# Patient Record
Sex: FEMALE | URBAN - METROPOLITAN AREA
[De-identification: names, ages, dates, MRNs, and addresses within clinical notes are randomized per-mention and may not be internally consistent; named-entity substitution may affect disease eponyms.]

---

## 2019-08-13 ENCOUNTER — APPOINTMENT (RX ONLY)
Dept: URBAN - METROPOLITAN AREA CLINIC 26 | Facility: CLINIC | Age: 59
Setting detail: DERMATOLOGY
End: 2019-08-13

## 2019-08-13 DIAGNOSIS — L28.1 PRURIGO NODULARIS: ICD-10-CM

## 2019-08-13 DIAGNOSIS — L82.1 OTHER SEBORRHEIC KERATOSIS: ICD-10-CM

## 2019-08-13 DIAGNOSIS — L81.4 OTHER MELANIN HYPERPIGMENTATION: ICD-10-CM

## 2019-08-13 DIAGNOSIS — L64.8 OTHER ANDROGENIC ALOPECIA: ICD-10-CM

## 2019-08-13 DIAGNOSIS — D22 MELANOCYTIC NEVI: ICD-10-CM

## 2019-08-13 DIAGNOSIS — L43.8 OTHER LICHEN PLANUS: ICD-10-CM

## 2019-08-13 DIAGNOSIS — D18.0 HEMANGIOMA: ICD-10-CM

## 2019-08-13 PROBLEM — D18.01 HEMANGIOMA OF SKIN AND SUBCUTANEOUS TISSUE: Status: ACTIVE | Noted: 2019-08-13

## 2019-08-13 PROBLEM — J45.909 UNSPECIFIED ASTHMA, UNCOMPLICATED: Status: ACTIVE | Noted: 2019-08-13

## 2019-08-13 PROBLEM — L30.9 DERMATITIS, UNSPECIFIED: Status: ACTIVE | Noted: 2019-08-13

## 2019-08-13 PROBLEM — D22.5 MELANOCYTIC NEVI OF TRUNK: Status: ACTIVE | Noted: 2019-08-13

## 2019-08-13 PROBLEM — D48.5 NEOPLASM OF UNCERTAIN BEHAVIOR OF SKIN: Status: ACTIVE | Noted: 2019-08-13

## 2019-08-13 PROCEDURE — 99214 OFFICE O/P EST MOD 30 MIN: CPT | Mod: 25

## 2019-08-13 PROCEDURE — ? ADDITIONAL NOTES

## 2019-08-13 PROCEDURE — ? TREATMENT REGIMEN

## 2019-08-13 PROCEDURE — ? PRESCRIPTION

## 2019-08-13 PROCEDURE — 11102 TANGNTL BX SKIN SINGLE LES: CPT

## 2019-08-13 PROCEDURE — 11103 TANGNTL BX SKIN EA SEP/ADDL: CPT

## 2019-08-13 PROCEDURE — ? BIOPSY BY SHAVE METHOD

## 2019-08-13 PROCEDURE — ? COUNSELING

## 2019-08-13 PROCEDURE — ? SUNSCREEN RECOMMENDATIONS

## 2019-08-13 RX ORDER — NALTREXONE HYDROCHLORIDE 50 MG/1
TABLET, FILM COATED ORAL
Qty: 10 | Refills: 0 | Status: ERX | COMMUNITY
Start: 2019-08-13

## 2019-08-13 RX ADMIN — NALTREXONE HYDROCHLORIDE: 50 TABLET, FILM COATED ORAL at 15:33

## 2019-08-13 ASSESSMENT — LOCATION DETAILED DESCRIPTION DERM
LOCATION DETAILED: LEFT ANTERIOR MEDIAL PROXIMAL THIGH
LOCATION DETAILED: PERIUMBILICAL SKIN
LOCATION DETAILED: RIGHT SUPERIOR PARIETAL SCALP
LOCATION DETAILED: LEFT ANTERIOR PROXIMAL THIGH
LOCATION DETAILED: SUPERIOR MID FOREHEAD
LOCATION DETAILED: EPIGASTRIC SKIN
LOCATION DETAILED: RIGHT CENTRAL MALAR CHEEK

## 2019-08-13 ASSESSMENT — LOCATION ZONE DERM
LOCATION ZONE: LEG
LOCATION ZONE: SCALP
LOCATION ZONE: FACE
LOCATION ZONE: TRUNK

## 2019-08-13 ASSESSMENT — LOCATION SIMPLE DESCRIPTION DERM
LOCATION SIMPLE: SUPERIOR FOREHEAD
LOCATION SIMPLE: RIGHT CHEEK
LOCATION SIMPLE: ABDOMEN
LOCATION SIMPLE: LEFT THIGH
LOCATION SIMPLE: SCALP

## 2019-08-13 NOTE — HPI: SKIN LESION
What Type Of Note Output Would You Prefer (Optional)?: Bullet Format
Has Your Skin Lesion Been Treated?: not been treated
Is This A New Presentation, Or A Follow-Up?: Skin Lesions
Which Family Member (Optional)?: Father

## 2019-08-13 NOTE — PROCEDURE: TREATMENT REGIMEN
Detail Level: Zone
Initiate Treatment: To create stock solution of low-dose naltrexone:  Crush 10 tabs (500 mg) into ~500 mL(16.9 oz) of orange juice for stock solution (1mg/mL). Keep refrigerated, solution expires after 3 months. Shake vigorously with each use.\\n\\nnaltrexone 50 mg tablet \\nSig: Shake solution vigorously, and take 3mL(3mg) of solution PO (1mg/mL dilution) daily.\\n\\nCordran Tape Large Roll 4 mcg/cm2 \\nSig: Apply strip of tape to affected areas QHS x 2 weeks. Do not apply longer than 2 weeks/spot of skin/month

## 2019-08-13 NOTE — HPI: EVALUATION OF SKIN LESION(S)
What Type Of Note Output Would You Prefer (Optional)?: Bullet Format
Hpi Title: Evaluation of Skin Lesions
Have Your Spot(S) Been Treated In The Past?: has not been treated
Family Member: Father

## 2019-08-13 NOTE — PROCEDURE: BIOPSY BY SHAVE METHOD
Detail Level: Detailed
Biopsy Method: Personna blade
Destruction After The Procedure: No
Post-Care Instructions: I reviewed with the patient in detail post-care instructions. Patient is to keep the biopsy site dry overnight, and then apply bacitracin twice daily until healed. Patient may apply hydrogen peroxide soaks to remove any crusting.
Cryotherapy Text: The wound bed was treated with cryotherapy after the biopsy was performed.
Lab Facility: [id number]
Hemostasis: Aluminum Chloride
Was A Bandage Applied: Yes
Wound Care: Petrolatum
Electrodesiccation Text: The wound bed was treated with electrodesiccation after the biopsy was performed.
Notification Instructions: Patient will be notified of biopsy results. However, patient instructed to call the office if not contacted within 2 weeks.
Anesthesia Volume In Cc (Will Not Render If 0): 0.3
Biopsy Type: H and E
Type Of Destruction Used: Curettage
Consent: Written consent was obtained and risks were reviewed including but not limited to scarring, infection, bleeding, scabbing, incomplete removal, nerve damage and allergy to anesthesia.
Electrodesiccation And Curettage Text: The wound bed was treated with electrodesiccation and curettage after the biopsy was performed.
X Size Of Lesion In Cm: 0
Depth Of Biopsy: dermis
Dressing: bandage
Silver Nitrate Text: The wound bed was treated with silver nitrate after the biopsy was performed.
Anesthesia Type: 1% lidocaine with epinephrine
Curettage Text: The wound bed was treated with curettage after the biopsy was performed.
Billing Type: Third-Party Bill
Lab: -[id number]

## 2019-08-15 RX ORDER — FLURANDRENOLIDE 4 UG/CM2
TAPE TOPICAL
Qty: 1 | Refills: 0 | Status: ERX | COMMUNITY
Start: 2019-08-15

## 2019-08-15 RX ADMIN — FLURANDRENOLIDE: 4 TAPE TOPICAL at 13:39

## 2019-09-24 ENCOUNTER — APPOINTMENT (RX ONLY)
Dept: URBAN - METROPOLITAN AREA CLINIC 26 | Facility: CLINIC | Age: 59
Setting detail: DERMATOLOGY
End: 2019-09-24

## 2019-09-24 DIAGNOSIS — L28.1 PRURIGO NODULARIS: ICD-10-CM | Status: IMPROVED

## 2019-09-24 DIAGNOSIS — B07.8 OTHER VIRAL WARTS: ICD-10-CM

## 2019-09-24 PROBLEM — L30.9 DERMATITIS, UNSPECIFIED: Status: ACTIVE | Noted: 2019-09-24

## 2019-09-24 PROCEDURE — ? COUNSELING

## 2019-09-24 PROCEDURE — 17110 DESTRUCTION B9 LES UP TO 14: CPT

## 2019-09-24 PROCEDURE — ? TREATMENT REGIMEN

## 2019-09-24 PROCEDURE — 99212 OFFICE O/P EST SF 10 MIN: CPT | Mod: 25

## 2019-09-24 PROCEDURE — ? LIQUID NITROGEN

## 2019-09-24 PROCEDURE — ? ADDITIONAL NOTES

## 2019-09-24 PROCEDURE — ? PATIENT SPECIFIC COUNSELING

## 2019-09-24 ASSESSMENT — LOCATION SIMPLE DESCRIPTION DERM
LOCATION SIMPLE: RIGHT CHEEK
LOCATION SIMPLE: RIGHT CALF

## 2019-09-24 ASSESSMENT — LOCATION ZONE DERM
LOCATION ZONE: LEG
LOCATION ZONE: FACE

## 2019-09-24 ASSESSMENT — LOCATION DETAILED DESCRIPTION DERM
LOCATION DETAILED: RIGHT PROXIMAL CALF
LOCATION DETAILED: RIGHT CENTRAL MALAR CHEEK

## 2019-09-24 NOTE — PROCEDURE: TREATMENT REGIMEN
Detail Level: Zone
Continue Regimen: To create stock solution of low-dose naltrexone:  Crush 10 tabs (500 mg) into ~500 mL(16.9 oz) of orange juice for stock solution (1mg/mL). Keep refrigerated, solution expires after 3 months. Shake vigorously with each use.\\n\\nnaltrexone 50 mg tablet \\nSig: Shake solution vigorously, and take 3mL(3mg) of solution PO (1mg/mL dilution) daily.\\n\\nCordran Tape Large Roll 4 mcg/cm2 \\nSig: Apply strip of tape to affected areas QHS x 2 weeks. Do not apply longer than 2 weeks/spot of skin/month

## 2019-09-24 NOTE — PROCEDURE: PATIENT SPECIFIC COUNSELING
Advised patient to f/u with psychiatrist. Advised patient she may need to  of her anxiety, as her current medication regimen may be inadequate\\n\\nAdvised her she may discontinue naltrexone solution, though patient reports she would like to finish stock solution and discontinue at that time, in the event it may be helpful.
Detail Level: Zone

## 2019-09-24 NOTE — PROCEDURE: LIQUID NITROGEN
Medical Necessity Clause: This procedure was medically necessary because the lesions that were treated were:
Medical Necessity Information: It is in your best interest to select a reason for this procedure from the list below. All of these items fulfill various CMS LCD requirements except the new and changing color options.
Consent: The patient's consent was obtained including but not limited to risks of crusting, scabbing, blistering, scarring, darker or lighter pigmentary change, recurrence, incomplete removal and infection.
Detail Level: Detailed
Post-Care Instructions: I reviewed with the patient in detail post-care instructions. Patient is to wear sunprotection, and avoid picking at any of the treated lesions. Pt may apply Vaseline to crusted or scabbing areas.
Include Z78.9 (Other Specified Conditions Influencing Health Status) As An Associated Diagnosis?: No
Number Of Freeze-Thaw Cycles: 3 freeze-thaw cycles

## 2020-05-27 ENCOUNTER — RX ONLY (OUTPATIENT)
Age: 60
Setting detail: RX ONLY
End: 2020-05-27

## 2020-05-27 RX ORDER — FLURANDRENOLIDE 4 UG/CM2
TAPE TOPICAL
Qty: 1 | Refills: 3 | Status: ERX

## 2021-06-14 ENCOUNTER — TELEPHONE (OUTPATIENT)
Dept: SCHEDULING | Facility: CLINIC | Age: 61
End: 2021-06-14

## 2021-06-14 NOTE — TELEPHONE ENCOUNTER
New Patient Appointment Request    Name of caller:  Patient     Diagnosis:  NP - Pt states she is referred by Neurology to Dr. Hall. Pt states her Neurologist and Dr. Hall spoke last week.     Referred by:  Neurology     Best contact number: [phone].     Additional notes: Pt calling to check her Appt Date and time  - No Appt was scheduled.     Pt is scheduled for First Available NP Appt for 7/09/2021 @ 1pm.     Pt is Requesting to be added to Cancellation list for sooner appt.     TY

## 2021-07-09 ENCOUNTER — OFFICE VISIT (OUTPATIENT)
Dept: CARDIOLOGY | Facility: CLINIC | Age: 61
End: 2021-07-09
Payer: COMMERCIAL

## 2021-07-09 VITALS
WEIGHT: 124 LBS | DIASTOLIC BLOOD PRESSURE: 66 MMHG | HEART RATE: 100 BPM | BODY MASS INDEX: 19.93 KG/M2 | SYSTOLIC BLOOD PRESSURE: 124 MMHG | OXYGEN SATURATION: 99 % | HEIGHT: 66 IN

## 2021-07-09 DIAGNOSIS — R00.0 RACING HEART BEAT: Primary | ICD-10-CM

## 2021-07-09 DIAGNOSIS — R09.89 PALPABLE ABDOMINAL AORTA: ICD-10-CM

## 2021-07-09 DIAGNOSIS — R06.09 DYSPNEA ON EXERTION: ICD-10-CM

## 2021-07-09 DIAGNOSIS — I25.42 CORONARY ARTERY DISSECTION: ICD-10-CM

## 2021-07-09 DIAGNOSIS — I21.4 NON-ST ELEVATION MYOCARDIAL INFARCTION (NSTEMI) (CMS/HCC): ICD-10-CM

## 2021-07-09 DIAGNOSIS — E78.5 DYSLIPIDEMIA: ICD-10-CM

## 2021-07-09 DIAGNOSIS — I21.4 NSTEMI (NON-ST ELEVATED MYOCARDIAL INFARCTION) (CMS/HCC): ICD-10-CM

## 2021-07-09 DIAGNOSIS — R00.2 PALPITATIONS: ICD-10-CM

## 2021-07-09 DIAGNOSIS — R73.03 PREDIABETES: ICD-10-CM

## 2021-07-09 DIAGNOSIS — I25.10 CORONARY ARTERY DISEASE INVOLVING NATIVE CORONARY ARTERY OF NATIVE HEART WITHOUT ANGINA PECTORIS: ICD-10-CM

## 2021-07-09 PROCEDURE — 3008F BODY MASS INDEX DOCD: CPT | Performed by: INTERNAL MEDICINE

## 2021-07-09 PROCEDURE — 99205 OFFICE O/P NEW HI 60 MIN: CPT | Performed by: INTERNAL MEDICINE

## 2021-07-09 PROCEDURE — 93000 ELECTROCARDIOGRAM COMPLETE: CPT | Performed by: INTERNAL MEDICINE

## 2021-07-09 RX ORDER — ATORVASTATIN CALCIUM 40 MG/1
40 TABLET, FILM COATED ORAL NIGHTLY
COMMUNITY
End: 2021-10-29 | Stop reason: SDUPTHER

## 2021-07-09 RX ORDER — TRIAMCINOLONE ACETONIDE 55 UG/1
SPRAY, METERED NASAL AS NEEDED
COMMUNITY
End: 2022-03-08

## 2021-07-09 RX ORDER — BUPROPION HYDROCHLORIDE 300 MG/1
300 TABLET ORAL DAILY
COMMUNITY
End: 2022-06-06

## 2021-07-09 RX ORDER — MONTELUKAST SODIUM 10 MG/1
1 TABLET ORAL NIGHTLY
COMMUNITY

## 2021-07-09 RX ORDER — DULOXETIN HYDROCHLORIDE 30 MG/1
90 CAPSULE, DELAYED RELEASE ORAL DAILY
COMMUNITY
End: 2022-05-31

## 2021-07-09 RX ORDER — DIPHENHYDRAMINE HCL 25 MG
1 CAPSULE ORAL NIGHTLY
COMMUNITY
End: 2022-06-06

## 2021-07-09 RX ORDER — ASPIRIN 81 MG/1
81 TABLET ORAL DAILY
COMMUNITY

## 2021-07-09 RX ORDER — ALBUTEROL SULFATE 90 UG/1
INHALANT RESPIRATORY (INHALATION) AS NEEDED
COMMUNITY
Start: 2021-01-28

## 2021-07-09 RX ORDER — LORAZEPAM 0.5 MG/1
0.5 TABLET ORAL AS NEEDED
COMMUNITY
Start: 2021-07-06 | End: 2023-07-12 | Stop reason: ALTCHOICE

## 2021-07-09 RX ORDER — AZELASTINE 1 MG/ML
SPRAY, METERED NASAL AS NEEDED
COMMUNITY

## 2021-07-09 RX ORDER — ALBUTEROL SULFATE 5 MG/ML
SOLUTION RESPIRATORY (INHALATION) AS NEEDED
COMMUNITY
End: 2021-07-09 | Stop reason: SDUPTHER

## 2021-07-09 RX ORDER — OLOPATADINE HYDROCHLORIDE 1 MG/ML
SOLUTION/ DROPS OPHTHALMIC AS NEEDED
COMMUNITY
Start: 2021-01-28 | End: 2022-03-08

## 2021-07-09 RX ORDER — ESZOPICLONE 3 MG/1
3 TABLET, FILM COATED ORAL NIGHTLY
COMMUNITY
End: 2022-06-06 | Stop reason: SDUPTHER

## 2021-07-09 RX ORDER — ATORVASTATIN CALCIUM 40 MG/1
40 TABLET, FILM COATED ORAL DAILY
COMMUNITY
Start: 2021-05-10 | End: 2021-07-09 | Stop reason: SDUPTHER

## 2021-07-09 ASSESSMENT — ENCOUNTER SYMPTOMS
CLAUDICATION: 0
WEIGHT LOSS: 1
DIZZINESS: 0
NAUSEA: 0
NERVOUS/ANXIOUS: 0
DIAPHORESIS: 0
DYSPNEA ON EXERTION: 1
PALPITATIONS: 1
HEARTBURN: 0
BACK PAIN: 0
DEPRESSION: 0
SHORTNESS OF BREATH: 0
SNORING: 0
WEIGHT GAIN: 0
COUGH: 0

## 2021-07-09 NOTE — ASSESSMENT & PLAN NOTE
In 2005, she had a myocardial infarction due to a coronary artery dissection. She believes this was due to a branch of the left anterior descending artery, possibly diagonal.  No PCI was performed. She presented at that time with severe jaw pain that then radiated into the chest and left arm. It was associated with severe nausea and some dyspnea. Symptoms have never recurred.

## 2021-07-09 NOTE — H&P (VIEW-ONLY)
Cardiology Consult Note    Brian Dean is a 60 y.o. female who presents for cardiovascular evaluation.   In 2005, she had a myocardial infarction due to a coronary artery dissection. She believes this was due to a branch of the left anterior descending artery, possibly diagonal.  No PCI was performed. She presented at that time with severe jaw pain that then radiated into the chest and left arm. It was associated with severe nausea and some dyspnea. Symptoms have never recurred.   In May of 2021, she was on a trip in the [place].  She was walking on a trip and developed severe dyspnea.  She noted her heart was racing. She was weak and tired after that and rested. She used an albuterol inhalant without benefit.   On June 9, a hot day at 90 degrees, she had walked 6 blocks and had to stop because of profound dyspnea- similar to what she felt in the [place].  She sat down and vomited. She felt was racing at that time. SBP was noted to be 170. She was kept overnight and underwent a series of tests as described below.   She does not do regular exercise.   MCGUIRE has been present since May 2021.  This will occur when running up steps.  Patient  denies chest distress,  orthopnea, PND, edema, syncope or near syncope.         Outside records reviewed..       Past Medical History:   Diagnosis Date   • Allergies    • Anxiety    • Asthma    • Coronary artery disease    • Coronary artery dissection    • Lipid disorder    • NSTEMI (non-ST elevated myocardial infarction) (CMS/HCC)    • Parvovirus B19 infection           Past Surgical History:   Procedure Laterality Date   • REDUCTION MAMMAPLASTY         Social History     Tobacco Use   • Smoking status: Never Smoker   • Smokeless tobacco: Never Used   Substance Use Topics   • Alcohol use: Never   • Drug use: Never          Family History   Problem Relation Age of Onset   • Hypertension Biological Mother    • Heart disease Biological Brother         Allergies     Sulfa (sulfonamide antibiotics) and Sulfamethoxazole-trimethoprim    Current Outpatient Medications   Medication Sig Dispense Refill   • albuterol HFA (VENTOLIN HFA) 90 mcg/actuation inhaler as needed.     • aspirin 81 mg enteric coated tablet Take 81 mg by mouth daily.     • atorvastatin (LIPITOR) 40 mg tablet Take 40 mg by mouth nightly.     • azelastine (ASTELIN) 137 mcg (0.1 %) nasal spray as needed.     • buPROPion XL (WELLBUTRIN XL) 300 mg 24 hr tablet Take 300 mg by mouth daily.     • diphenhydrAMINE (BenadryL) 25 mg capsule Take 1 tablet by mouth nightly.       • DULoxetine (CYMBALTA) 30 mg capsule Take 90 mg by mouth daily.       • eszopiclone (LUNESTA) 3 mg tablet Take 3 mg by mouth nightly.     • LORazepam (ATIVAN) 0.5 mg tablet Take 0.5 mg by mouth as needed.     • montelukast (SINGULAIR) 10 mg tablet Take 1 tablet by mouth nightly.     • olopatadine (PATANOL) 0.1 % ophthalmic solution as needed.     • triamcinolone (NASACORT) 55 mcg nasal inhaler as needed.       No current facility-administered medications for this visit.         Review of Systems   Constitutional: Positive for weight loss. Negative for diaphoresis and weight gain.   Eyes: Negative for visual disturbance.   Cardiovascular: Positive for dyspnea on exertion and palpitations. Negative for chest pain, claudication and leg swelling.   Respiratory: Negative for cough, shortness of breath and snoring.    Skin: Negative for rash.   Musculoskeletal: Negative for arthritis and back pain.   Gastrointestinal: Negative for heartburn and nausea.   Genitourinary: Negative for nocturia.   Neurological: Negative for dizziness.   Psychiatric/Behavioral: Negative for depression. The patient is not nervous/anxious.        Objective       Vitals:    07/09/21 1256   BP: 124/66   Pulse: 100   SpO2: 99%   /72 left arm sitting    Physical Exam  Constitutional:       Appearance: She is well-developed.   HENT:      Head: Normocephalic.    Eyes:      General:         Right eye: No discharge.         Left eye: No discharge.      Funduscopic exam:     Right eye: No AV nicking or arteriolar narrowing.         Left eye: No AV nicking or arteriolar narrowing.   Neck:      Vascular: No JVD.   Cardiovascular:      Rate and Rhythm: Normal rate and regular rhythm.      Heart sounds: Normal heart sounds.   Pulmonary:      Breath sounds: Normal breath sounds.   Abdominal:      General: Bowel sounds are normal.      Palpations: Abdomen is soft.      Comments: Pulsatile abdominal aorta   Musculoskeletal:         General: No deformity.   Skin:     Findings: No rash.   Neurological:      Mental Status: She is alert and oriented to person, place, and time.   Psychiatric:         Behavior: Behavior normal.             Imaging and labs  6/10 echo LVEF 60% no chamber enlargement, no LVH, no valve pathology, no comment about diastolic function of LV  6/9 CXR no active disease  6/10 regadenoson PET/CT- no coronary calcium, no ST changes, normal perfusion scan   Pro BNP 31 (normal 0-125)      ECG   Sinus, poor R wave progression, right atrial abnormality      Assessment:  This is a 60 y.o. female being consulted for dyspnea and palpitations.     Problem List Items Addressed This Visit        High    Dyspnea on exertion     6/10 echo LVEF 60% no chamber enlargement, no LVH, no valve pathology, no comment about diastolic function of LV  6/9 CXR no active disease  6/10 regadenoson PET/CT- no coronary calcium, no ST changes, normal perfusion scan   Pro BNP 31 (normal 0-125)    Differential would include    1) dyspnea due to tachyarrhythmia, perhaps precipitated by exercise  I would like to do an exercise test to assess her pulse and BP response to exercise  7 day monitor may also be helpful  2) anginal equivalent - see plan under CAD  3) occult volume overload - less likely based on exam, proBNP and echo but right heart cath could be done          Relevant Orders    CBC and  Differential    Basic metabolic panel    COVID-19 PAT/Pre-Procedural    Coronary artery disease involving native coronary artery of native heart without angina pectoris     CTA June 2021 showed a 70% LAD stenosis with a myocardial bridge of the distal LAD   Other vessels OK  No coronary calcium    I discussed with patient - a coronary arteriogram with FFR of LAD if needed was advised   She agrees to a right and left heart cath with Dr. Schofield next week.  Benefits vs risks and options reviewed in detail.          Relevant Medications    atorvastatin (LIPITOR) 40 mg tablet    aspirin 81 mg enteric coated tablet    Other Relevant Orders    Case Request Cath Lab: RIGHT & LEFT HEART CATH WITH CORONARY ANGIOGRAPHY (Completed)       Medium    NSTEMI (non-ST elevated myocardial infarction) (CMS/[facility])     In 2005, she had a myocardial infarction due to a coronary artery dissection. She believes this was due to a branch of the left anterior descending artery, possibly diagonal.  No PCI was performed. She presented at that time with severe jaw pain that then radiated into the chest and left arm. It was associated with severe nausea and some dyspnea. Symptoms have never recurred.                Relevant Medications    atorvastatin (LIPITOR) 40 mg tablet    aspirin 81 mg enteric coated tablet    Coronary artery dissection     12/27/ 2005, she had a myocardial infarction due to a coronary artery dissection. Cath report said a second diagonal branch was the culprit vessel.  No PCI was performed. She presented at that time with severe jaw pain that then radiated into the chest and left arm. It was associated with severe nausea and some dyspnea. Symptoms have never recurred.     I discussed CTA or MRA of cerebral and abdominal vessels to assess for FMD.    She will continue ASA         Relevant Medications    atorvastatin (LIPITOR) 40 mg tablet    aspirin 81 mg enteric coated tablet    Palpitations     Could be cause of  dyspnea  See plan under MCGUIRE  TSH normal on 6/2/21            Low    Dyslipidemia     Takes atorvastatin  I have no access to lipid panel results today          Palpable abdominal aorta     I advised an US of abdominal aorta be done at some point          Prediabetes     A1c of 5.8            Other Visit Diagnoses     Racing heart beat    -  Primary    Relevant Orders    ECG 12 LEAD-OFFICE PERFORMED (Completed)    Non-ST elevation myocardial infarction (NSTEMI) (CMS/[facility])        Relevant Medications    atorvastatin (LIPITOR) 40 mg tablet    aspirin 81 mg enteric coated tablet    Other Relevant Orders    Case Request Cath Lab: RIGHT & LEFT HEART CATH WITH CORONARY ANGIOGRAPHY (Completed)        I spent 80 minutes on this date of service performing the following activities: obtaining history, performing examination, entering orders, documenting, preparing for visit, obtaining / reviewing records, providing counseling and education, independently reviewing study/studies and communicating results.    Eleazar Hall MD  7/9/2021

## 2021-07-09 NOTE — ASSESSMENT & PLAN NOTE
6/10 echo LVEF 60% no chamber enlargement, no LVH, no valve pathology, no comment about diastolic function of LV  6/9 CXR no active disease  6/10 regadenoson PET/CT- no coronary calcium, no ST changes, normal perfusion scan   Pro BNP 31 (normal 0-125)    Differential would include    1) dyspnea due to tachyarrhythmia, perhaps precipitated by exercise  I would like to do an exercise test to assess her pulse and BP response to exercise  7 day monitor may also be helpful  2) anginal equivalent - see plan under CAD  3) occult volume overload - less likely based on exam, proBNP and echo but right heart cath could be done

## 2021-07-09 NOTE — ASSESSMENT & PLAN NOTE
CTA June 2021 showed a 70% LAD stenosis with a myocardial bridge of the distal LAD   Other vessels OK  No coronary calcium    I discussed with patient - a coronary arteriogram with FFR of LAD if needed was advised   She agrees to a right and left heart cath with Dr. Schofield next week.  Benefits vs risks and options reviewed in detail.

## 2021-07-09 NOTE — LETTER
July 9, 2021     Jorge Robertson MD  [address] [facility] [address]  [id number]  [facility] [place] [address]    Patient: Brian Dean  YOB: 1960  Date of Visit: 7/9/2021      Dear Dr. Robertson:    Thank you for referring Brian Dean to me for evaluation. Below are my notes for this consultation.    If you have questions, please do not hesitate to call me. I look forward to following your patient along with you.         Sincerely,        Eleazar Hall MD        CC: MD Wilma Heck III, MD  Brian Dean  DO Rafael Vargas James F, MD  7/9/2021  2:55 PM  Signed  Cardiology Consult Note    Brian Dean is a 60 y.o. female who presents for cardiovascular evaluation.   In 2005, she had a myocardial infarction due to a coronary artery dissection. She believes this was due to a branch of the left anterior descending artery, possibly diagonal.  No PCI was performed. She presented at that time with severe jaw pain that then radiated into the chest and left arm. It was associated with severe nausea and some dyspnea. Symptoms have never recurred.   In May of 2021, she was on a trip in the [place].  She was walking on a trip and developed severe dyspnea.  She noted her heart was racing. She was weak and tired after that and rested. She used an albuterol inhalant without benefit.   On June 9, a hot day at 90 degrees, she had walked 6 blocks and had to stop because of profound dyspnea- similar to what she felt in the [place].  She sat down and vomited. She felt was racing at that time. SBP was noted to be 170. She was kept overnight and underwent a series of tests as described below.   She does not do regular exercise.   MCGUIRE has been present since May 2021.  This will occur when running up steps.  Patient  denies chest distress,  orthopnea, PND, edema, syncope or near syncope.         Outside records reviewed..       Past  Medical History:   Diagnosis Date   • Allergies    • Anxiety    • Asthma    • Coronary artery disease    • Coronary artery dissection    • Lipid disorder    • NSTEMI (non-ST elevated myocardial infarction) (CMS/HCC)    • Parvovirus B19 infection           Past Surgical History:   Procedure Laterality Date   • REDUCTION MAMMAPLASTY         Social History     Tobacco Use   • Smoking status: Never Smoker   • Smokeless tobacco: Never Used   Substance Use Topics   • Alcohol use: Never   • Drug use: Never          Family History   Problem Relation Age of Onset   • Hypertension Biological Mother    • Heart disease Biological Brother        Allergies     Sulfa (sulfonamide antibiotics) and Sulfamethoxazole-trimethoprim    Current Outpatient Medications   Medication Sig Dispense Refill   • albuterol HFA (VENTOLIN HFA) 90 mcg/actuation inhaler as needed.     • aspirin 81 mg enteric coated tablet Take 81 mg by mouth daily.     • atorvastatin (LIPITOR) 40 mg tablet Take 40 mg by mouth nightly.     • azelastine (ASTELIN) 137 mcg (0.1 %) nasal spray as needed.     • buPROPion XL (WELLBUTRIN XL) 300 mg 24 hr tablet Take 300 mg by mouth daily.     • diphenhydrAMINE (BenadryL) 25 mg capsule Take 1 tablet by mouth nightly.       • DULoxetine (CYMBALTA) 30 mg capsule Take 90 mg by mouth daily.       • eszopiclone (LUNESTA) 3 mg tablet Take 3 mg by mouth nightly.     • LORazepam (ATIVAN) 0.5 mg tablet Take 0.5 mg by mouth as needed.     • montelukast (SINGULAIR) 10 mg tablet Take 1 tablet by mouth nightly.     • olopatadine (PATANOL) 0.1 % ophthalmic solution as needed.     • triamcinolone (NASACORT) 55 mcg nasal inhaler as needed.       No current facility-administered medications for this visit.         Review of Systems   Constitutional: Positive for weight loss. Negative for diaphoresis and weight gain.   Eyes: Negative for visual disturbance.   Cardiovascular: Positive for dyspnea on exertion and palpitations. Negative for chest  pain, claudication and leg swelling.   Respiratory: Negative for cough, shortness of breath and snoring.    Skin: Negative for rash.   Musculoskeletal: Negative for arthritis and back pain.   Gastrointestinal: Negative for heartburn and nausea.   Genitourinary: Negative for nocturia.   Neurological: Negative for dizziness.   Psychiatric/Behavioral: Negative for depression. The patient is not nervous/anxious.        Objective       Vitals:    07/09/21 1256   BP: 124/66   Pulse: 100   SpO2: 99%   /72 left arm sitting    Physical Exam  Constitutional:       Appearance: She is well-developed.   HENT:      Head: Normocephalic.   Eyes:      General:         Right eye: No discharge.         Left eye: No discharge.      Funduscopic exam:     Right eye: No AV nicking or arteriolar narrowing.         Left eye: No AV nicking or arteriolar narrowing.   Neck:      Vascular: No JVD.   Cardiovascular:      Rate and Rhythm: Normal rate and regular rhythm.      Heart sounds: Normal heart sounds.   Pulmonary:      Breath sounds: Normal breath sounds.   Abdominal:      General: Bowel sounds are normal.      Palpations: Abdomen is soft.      Comments: Pulsatile abdominal aorta   Musculoskeletal:         General: No deformity.   Skin:     Findings: No rash.   Neurological:      Mental Status: She is alert and oriented to person, place, and time.   Psychiatric:         Behavior: Behavior normal.             Imaging and labs  6/10 echo LVEF 60% no chamber enlargement, no LVH, no valve pathology, no comment about diastolic function of LV  6/9 CXR no active disease  6/10 regadenoson PET/CT- no coronary calcium, no ST changes, normal perfusion scan   Pro BNP 31 (normal 0-125)      ECG   Sinus, poor R wave progression, right atrial abnormality      Assessment:  This is a 60 y.o. female being consulted for dyspnea and palpitations.     Problem List Items Addressed This Visit        High    Dyspnea on exertion     6/10 echo LVEF 60% no  chamber enlargement, no LVH, no valve pathology, no comment about diastolic function of LV  6/9 CXR no active disease  6/10 regadenoson PET/CT- no coronary calcium, no ST changes, normal perfusion scan   Pro BNP 31 (normal 0-125)    Differential would include    1) dyspnea due to tachyarrhythmia, perhaps precipitated by exercise  I would like to do an exercise test to assess her pulse and BP response to exercise  7 day monitor may also be helpful  2) anginal equivalent - see plan under CAD  3) occult volume overload - less likely based on exam, proBNP and echo but right heart cath could be done          Relevant Orders    CBC and Differential    Basic metabolic panel    COVID-19 PAT/Pre-Procedural    Coronary artery disease involving native coronary artery of native heart without angina pectoris     CTA June 2021 showed a 70% LAD stenosis with a myocardial bridge of the distal LAD   Other vessels OK  No coronary calcium    I discussed with patient - a coronary arteriogram with FFR of LAD if needed was advised   She agrees to a right and left heart cath with Dr. Schofield next week.  Benefits vs risks and options reviewed in detail.          Relevant Medications    atorvastatin (LIPITOR) 40 mg tablet    aspirin 81 mg enteric coated tablet    Other Relevant Orders    Case Request Cath Lab: RIGHT & LEFT HEART CATH WITH CORONARY ANGIOGRAPHY (Completed)       Medium    NSTEMI (non-ST elevated myocardial infarction) (CMS/[facility])     In 2005, she had a myocardial infarction due to a coronary artery dissection. She believes this was due to a branch of the left anterior descending artery, possibly diagonal.  No PCI was performed. She presented at that time with severe jaw pain that then radiated into the chest and left arm. It was associated with severe nausea and some dyspnea. Symptoms have never recurred.                Relevant Medications    atorvastatin (LIPITOR) 40 mg tablet    aspirin 81 mg enteric coated tablet     Coronary artery dissection     12/27/ 2005, she had a myocardial infarction due to a coronary artery dissection. Cath report said a second diagonal branch was the culprit vessel.  No PCI was performed. She presented at that time with severe jaw pain that then radiated into the chest and left arm. It was associated with severe nausea and some dyspnea. Symptoms have never recurred.     I discussed CTA or MRA of cerebral and abdominal vessels to assess for FMD.    She will continue ASA         Relevant Medications    atorvastatin (LIPITOR) 40 mg tablet    aspirin 81 mg enteric coated tablet    Palpitations     Could be cause of dyspnea  See plan under MCGUIRE  TSH normal on 6/2/21            Low    Dyslipidemia     Takes atorvastatin  I have no access to lipid panel results today          Palpable abdominal aorta     I advised an US of abdominal aorta be done at some point          Prediabetes     A1c of 5.8            Other Visit Diagnoses     Racing heart beat    -  Primary    Relevant Orders    ECG 12 LEAD-OFFICE PERFORMED (Completed)    Non-ST elevation myocardial infarction (NSTEMI) (CMS/[facility])        Relevant Medications    atorvastatin (LIPITOR) 40 mg tablet    aspirin 81 mg enteric coated tablet    Other Relevant Orders    Case Request Cath Lab: RIGHT & LEFT HEART CATH WITH CORONARY ANGIOGRAPHY (Completed)        I spent 80 minutes on this date of service performing the following activities: obtaining history, performing examination, entering orders, documenting, preparing for visit, obtaining / reviewing records, providing counseling and education, independently reviewing study/studies and communicating results.    Eleazar Hall MD  7/9/2021

## 2021-07-09 NOTE — ASSESSMENT & PLAN NOTE
12/27/ 2005, she had a myocardial infarction due to a coronary artery dissection. Cath report said a second diagonal branch was the culprit vessel.  No PCI was performed. She presented at that time with severe jaw pain that then radiated into the chest and left arm. It was associated with severe nausea and some dyspnea. Symptoms have never recurred.     I discussed CTA or MRA of cerebral and abdominal vessels to assess for FMD.    She will continue ASA

## 2021-07-09 NOTE — PROGRESS NOTES
Cardiology Consult Note    Biran Dean is a 60 y.o. female who presents for cardiovascular evaluation.   In 2005, she had a myocardial infarction due to a coronary artery dissection. She believes this was due to a branch of the left anterior descending artery, possibly diagonal.  No PCI was performed. She presented at that time with severe jaw pain that then radiated into the chest and left arm. It was associated with severe nausea and some dyspnea. Symptoms have never recurred.   In May of 2021, she was on a trip in the [place].  She was walking on a trip and developed severe dyspnea.  She noted her heart was racing. She was weak and tired after that and rested. She used an albuterol inhalant without benefit.   On June 9, a hot day at 90 degrees, she had walked 6 blocks and had to stop because of profound dyspnea- similar to what she felt in the [place].  She sat down and vomited. She felt was racing at that time. SBP was noted to be 170. She was kept overnight and underwent a series of tests as described below.   She does not do regular exercise.   MCGUIRE has been present since May 2021.  This will occur when running up steps.  Patient  denies chest distress,  orthopnea, PND, edema, syncope or near syncope.         Outside records reviewed..       Past Medical History:   Diagnosis Date   • Allergies    • Anxiety    • Asthma    • Coronary artery disease    • Coronary artery dissection    • Lipid disorder    • NSTEMI (non-ST elevated myocardial infarction) (CMS/HCC)    • Parvovirus B19 infection           Past Surgical History:   Procedure Laterality Date   • REDUCTION MAMMAPLASTY         Social History     Tobacco Use   • Smoking status: Never Smoker   • Smokeless tobacco: Never Used   Substance Use Topics   • Alcohol use: Never   • Drug use: Never          Family History   Problem Relation Age of Onset   • Hypertension Biological Mother    • Heart disease Biological Brother         Allergies     Sulfa (sulfonamide antibiotics) and Sulfamethoxazole-trimethoprim    Current Outpatient Medications   Medication Sig Dispense Refill   • albuterol HFA (VENTOLIN HFA) 90 mcg/actuation inhaler as needed.     • aspirin 81 mg enteric coated tablet Take 81 mg by mouth daily.     • atorvastatin (LIPITOR) 40 mg tablet Take 40 mg by mouth nightly.     • azelastine (ASTELIN) 137 mcg (0.1 %) nasal spray as needed.     • buPROPion XL (WELLBUTRIN XL) 300 mg 24 hr tablet Take 300 mg by mouth daily.     • diphenhydrAMINE (BenadryL) 25 mg capsule Take 1 tablet by mouth nightly.       • DULoxetine (CYMBALTA) 30 mg capsule Take 90 mg by mouth daily.       • eszopiclone (LUNESTA) 3 mg tablet Take 3 mg by mouth nightly.     • LORazepam (ATIVAN) 0.5 mg tablet Take 0.5 mg by mouth as needed.     • montelukast (SINGULAIR) 10 mg tablet Take 1 tablet by mouth nightly.     • olopatadine (PATANOL) 0.1 % ophthalmic solution as needed.     • triamcinolone (NASACORT) 55 mcg nasal inhaler as needed.       No current facility-administered medications for this visit.         Review of Systems   Constitutional: Positive for weight loss. Negative for diaphoresis and weight gain.   Eyes: Negative for visual disturbance.   Cardiovascular: Positive for dyspnea on exertion and palpitations. Negative for chest pain, claudication and leg swelling.   Respiratory: Negative for cough, shortness of breath and snoring.    Skin: Negative for rash.   Musculoskeletal: Negative for arthritis and back pain.   Gastrointestinal: Negative for heartburn and nausea.   Genitourinary: Negative for nocturia.   Neurological: Negative for dizziness.   Psychiatric/Behavioral: Negative for depression. The patient is not nervous/anxious.        Objective       Vitals:    07/09/21 1256   BP: 124/66   Pulse: 100   SpO2: 99%   /72 left arm sitting    Physical Exam  Constitutional:       Appearance: She is well-developed.   HENT:      Head: Normocephalic.    Eyes:      General:         Right eye: No discharge.         Left eye: No discharge.      Funduscopic exam:     Right eye: No AV nicking or arteriolar narrowing.         Left eye: No AV nicking or arteriolar narrowing.   Neck:      Vascular: No JVD.   Cardiovascular:      Rate and Rhythm: Normal rate and regular rhythm.      Heart sounds: Normal heart sounds.   Pulmonary:      Breath sounds: Normal breath sounds.   Abdominal:      General: Bowel sounds are normal.      Palpations: Abdomen is soft.      Comments: Pulsatile abdominal aorta   Musculoskeletal:         General: No deformity.   Skin:     Findings: No rash.   Neurological:      Mental Status: She is alert and oriented to person, place, and time.   Psychiatric:         Behavior: Behavior normal.             Imaging and labs  6/10 echo LVEF 60% no chamber enlargement, no LVH, no valve pathology, no comment about diastolic function of LV  6/9 CXR no active disease  6/10 regadenoson PET/CT- no coronary calcium, no ST changes, normal perfusion scan   Pro BNP 31 (normal 0-125)      ECG   Sinus, poor R wave progression, right atrial abnormality      Assessment:  This is a 60 y.o. female being consulted for dyspnea and palpitations.     Problem List Items Addressed This Visit        High    Dyspnea on exertion     6/10 echo LVEF 60% no chamber enlargement, no LVH, no valve pathology, no comment about diastolic function of LV  6/9 CXR no active disease  6/10 regadenoson PET/CT- no coronary calcium, no ST changes, normal perfusion scan   Pro BNP 31 (normal 0-125)    Differential would include    1) dyspnea due to tachyarrhythmia, perhaps precipitated by exercise  I would like to do an exercise test to assess her pulse and BP response to exercise  7 day monitor may also be helpful  2) anginal equivalent - see plan under CAD  3) occult volume overload - less likely based on exam, proBNP and echo but right heart cath could be done          Relevant Orders    CBC and  Differential    Basic metabolic panel    COVID-19 PAT/Pre-Procedural    Coronary artery disease involving native coronary artery of native heart without angina pectoris     CTA June 2021 showed a 70% LAD stenosis with a myocardial bridge of the distal LAD   Other vessels OK  No coronary calcium    I discussed with patient - a coronary arteriogram with FFR of LAD if needed was advised   She agrees to a right and left heart cath with Dr. Schofield next week.  Benefits vs risks and options reviewed in detail.          Relevant Medications    atorvastatin (LIPITOR) 40 mg tablet    aspirin 81 mg enteric coated tablet    Other Relevant Orders    Case Request Cath Lab: RIGHT & LEFT HEART CATH WITH CORONARY ANGIOGRAPHY (Completed)       Medium    NSTEMI (non-ST elevated myocardial infarction) (CMS/[facility])     In 2005, she had a myocardial infarction due to a coronary artery dissection. She believes this was due to a branch of the left anterior descending artery, possibly diagonal.  No PCI was performed. She presented at that time with severe jaw pain that then radiated into the chest and left arm. It was associated with severe nausea and some dyspnea. Symptoms have never recurred.                Relevant Medications    atorvastatin (LIPITOR) 40 mg tablet    aspirin 81 mg enteric coated tablet    Coronary artery dissection     12/27/ 2005, she had a myocardial infarction due to a coronary artery dissection. Cath report said a second diagonal branch was the culprit vessel.  No PCI was performed. She presented at that time with severe jaw pain that then radiated into the chest and left arm. It was associated with severe nausea and some dyspnea. Symptoms have never recurred.     I discussed CTA or MRA of cerebral and abdominal vessels to assess for FMD.    She will continue ASA         Relevant Medications    atorvastatin (LIPITOR) 40 mg tablet    aspirin 81 mg enteric coated tablet    Palpitations     Could be cause of  dyspnea  See plan under MCGUIRE  TSH normal on 6/2/21            Low    Dyslipidemia     Takes atorvastatin  I have no access to lipid panel results today          Palpable abdominal aorta     I advised an US of abdominal aorta be done at some point          Prediabetes     A1c of 5.8            Other Visit Diagnoses     Racing heart beat    -  Primary    Relevant Orders    ECG 12 LEAD-OFFICE PERFORMED (Completed)    Non-ST elevation myocardial infarction (NSTEMI) (CMS/[facility])        Relevant Medications    atorvastatin (LIPITOR) 40 mg tablet    aspirin 81 mg enteric coated tablet    Other Relevant Orders    Case Request Cath Lab: RIGHT & LEFT HEART CATH WITH CORONARY ANGIOGRAPHY (Completed)        I spent 80 minutes on this date of service performing the following activities: obtaining history, performing examination, entering orders, documenting, preparing for visit, obtaining / reviewing records, providing counseling and education, independently reviewing study/studies and communicating results.    Eleazar Hall MD  7/9/2021

## 2021-07-12 ENCOUNTER — TELEPHONE (OUTPATIENT)
Dept: SCHEDULING | Facility: CLINIC | Age: 61
End: 2021-07-12

## 2021-07-12 NOTE — TELEPHONE ENCOUNTER
Pt calling to Schedule PATs for Upcoming Procedure with Dr. Schofield for RIGHT & LEFT HEART CATH WITH CORONARY ANGIOGRAPHY .     Pt can be reached @ [phone].     TY

## 2021-07-12 NOTE — TELEPHONE ENCOUNTER
Called and spoke with patient and scheduled her R & L Cath with Dr. Schofield on Wednesday, 7/14/21.

## 2021-07-14 ENCOUNTER — HOSPITAL ENCOUNTER (INPATIENT)
Facility: HOSPITAL | Age: 61
LOS: 4 days | Discharge: HOME | DRG: 233 | End: 2021-07-20
Attending: INTERNAL MEDICINE | Admitting: THORACIC SURGERY (CARDIOTHORACIC VASCULAR SURGERY)
Payer: COMMERCIAL

## 2021-07-14 ENCOUNTER — APPOINTMENT (OUTPATIENT)
Dept: RADIOLOGY | Facility: HOSPITAL | Age: 61
DRG: 233 | End: 2021-07-14
Attending: PHYSICIAN ASSISTANT
Payer: COMMERCIAL

## 2021-07-14 DIAGNOSIS — I25.10 CORONARY ARTERY DISEASE INVOLVING NATIVE CORONARY ARTERY OF NATIVE HEART WITHOUT ANGINA PECTORIS: ICD-10-CM

## 2021-07-14 DIAGNOSIS — I25.42 CORONARY ARTERY DISSECTION: Primary | ICD-10-CM

## 2021-07-14 DIAGNOSIS — I21.4 NSTEMI (NON-ST ELEVATED MYOCARDIAL INFARCTION) (CMS/HCC): ICD-10-CM

## 2021-07-14 DIAGNOSIS — I21.4 NON-ST ELEVATION MYOCARDIAL INFARCTION (NSTEMI) (CMS/HCC): ICD-10-CM

## 2021-07-14 PROBLEM — J45.909 ASTHMA: Status: ACTIVE | Noted: 2021-07-14

## 2021-07-14 PROBLEM — F41.9 ANXIETY: Status: ACTIVE | Noted: 2021-07-14

## 2021-07-14 PROBLEM — J30.2 SEASONAL ALLERGIES: Status: ACTIVE | Noted: 2021-07-14

## 2021-07-14 LAB
ABO + RH BLD: NORMAL
ALBUMIN SERPL-MCNC: 4.1 G/DL (ref 3.4–5)
ALP SERPL-CCNC: 85 IU/L (ref 35–126)
ALT SERPL-CCNC: 23 IU/L (ref 11–54)
ANION GAP SERPL CALC-SCNC: 13 MEQ/L (ref 3–15)
ANION GAP SERPL CALC-SCNC: 13 MEQ/L (ref 3–15)
APTT PPP: 23 SEC (ref 23–35)
AST SERPL-CCNC: 23 IU/L (ref 15–41)
BILIRUB SERPL-MCNC: 0.7 MG/DL (ref 0.3–1.2)
BLD GP AB SCN SERPL QL: NEGATIVE
BUN SERPL-MCNC: 16 MG/DL (ref 8–20)
BUN SERPL-MCNC: 17 MG/DL (ref 8–20)
CALCIUM SERPL-MCNC: 9.7 MG/DL (ref 8.9–10.3)
CALCIUM SERPL-MCNC: 9.7 MG/DL (ref 8.9–10.3)
CHLORIDE SERPL-SCNC: 101 MEQ/L (ref 98–109)
CHLORIDE SERPL-SCNC: 104 MEQ/L (ref 98–109)
CHOLEST SERPL-MCNC: 150 MG/DL
CO2 SERPL-SCNC: 22 MEQ/L (ref 22–32)
CO2 SERPL-SCNC: 23 MEQ/L (ref 22–32)
CREAT SERPL-MCNC: 0.9 MG/DL (ref 0.6–1.1)
CREAT SERPL-MCNC: 1 MG/DL (ref 0.6–1.1)
D AG BLD QL: POSITIVE
ERYTHROCYTE [DISTWIDTH] IN BLOOD BY AUTOMATED COUNT: 13.2 % (ref 11.7–14.4)
ERYTHROCYTE [DISTWIDTH] IN BLOOD BY AUTOMATED COUNT: 13.2 % (ref 11.7–14.4)
EST. AVERAGE GLUCOSE BLD GHB EST-MCNC: 111 MG/DL
GFR SERPL CREATININE-BSD FRML MDRD: 56.2 ML/MIN/1.73M*2
GFR SERPL CREATININE-BSD FRML MDRD: >60 ML/MIN/1.73M*2
GLUCOSE SERPL-MCNC: 102 MG/DL (ref 70–99)
GLUCOSE SERPL-MCNC: 83 MG/DL (ref 70–99)
HBA1C MFR BLD HPLC: 5.5 %
HCT VFR BLDCO AUTO: 38.3 % (ref 35–45)
HCT VFR BLDCO AUTO: 38.8 % (ref 35–45)
HDLC SERPL-MCNC: 77 MG/DL
HDLC SERPL: 1.9 {RATIO}
HGB BLD-MCNC: 12.5 G/DL (ref 11.8–15.7)
HGB BLD-MCNC: 12.7 G/DL (ref 11.8–15.7)
INR PPP: 0.9
LABORATORY COMMENT REPORT: NORMAL
LDLC SERPL CALC-MCNC: 58 MG/DL
MAGNESIUM SERPL-MCNC: 1.7 MG/DL (ref 1.8–2.5)
MCH RBC QN AUTO: 29.5 PG (ref 28–33.2)
MCH RBC QN AUTO: 30.1 PG (ref 28–33.2)
MCHC RBC AUTO-ENTMCNC: 32.2 G/DL (ref 32.2–35.5)
MCHC RBC AUTO-ENTMCNC: 33.2 G/DL (ref 32.2–35.5)
MCV RBC AUTO: 90.8 FL (ref 83–98)
MCV RBC AUTO: 91.5 FL (ref 83–98)
MRSA DNA SPEC QL NAA+PROBE: NEGATIVE
NONHDLC SERPL-MCNC: 73 MG/DL
PDW BLD AUTO: 10 FL (ref 9.4–12.3)
PDW BLD AUTO: 10.3 FL (ref 9.4–12.3)
PHOSPHATE SERPL-MCNC: 3.9 MG/DL (ref 2.4–4.7)
PLATELET # BLD AUTO: 301 K/UL (ref 150–369)
PLATELET # BLD AUTO: 327 K/UL (ref 150–369)
POCT OXYHGB: 79.4 % (ref 93–98)
POCT TEST: ABNORMAL
POTASSIUM SERPL-SCNC: 4.3 MEQ/L (ref 3.6–5.1)
POTASSIUM SERPL-SCNC: 5 MEQ/L (ref 3.6–5.1)
PREALB SERPL-MCNC: 27.5 MG/DL (ref 18–38)
PROT SERPL-MCNC: 6.7 G/DL (ref 6–8.2)
PROTHROMBIN TIME: 11.8 SEC (ref 12.2–14.5)
RBC # BLD AUTO: 4.22 M/UL (ref 3.93–5.22)
RBC # BLD AUTO: 4.24 M/UL (ref 3.93–5.22)
SARS-COV-2 RNA RESP QL NAA+PROBE: NEGATIVE
SODIUM SERPL-SCNC: 137 MEQ/L (ref 136–144)
SODIUM SERPL-SCNC: 139 MEQ/L (ref 136–144)
SPECIMEN EXP DATE BLD: NORMAL
T4 FREE SERPL-MCNC: 1.07 NG/DL (ref 0.58–1.64)
TRIGL SERPL-MCNC: 73 MG/DL (ref 30–149)
TSH SERPL DL<=0.05 MIU/L-ACNC: 2.33 MIU/L (ref 0.34–5.6)
WBC # BLD AUTO: 5.25 K/UL (ref 3.8–10.5)
WBC # BLD AUTO: 8.73 K/UL (ref 3.8–10.5)

## 2021-07-14 PROCEDURE — 85027 COMPLETE CBC AUTOMATED: CPT | Performed by: PHYSICIAN ASSISTANT

## 2021-07-14 PROCEDURE — 83735 ASSAY OF MAGNESIUM: CPT | Performed by: PHYSICIAN ASSISTANT

## 2021-07-14 PROCEDURE — C1769 GUIDE WIRE: HCPCS | Performed by: INTERNAL MEDICINE

## 2021-07-14 PROCEDURE — 63600105 HC IODINE BASED CONTRAST: Mod: JW | Performed by: INTERNAL MEDICINE

## 2021-07-14 PROCEDURE — 87641 MR-STAPH DNA AMP PROBE: CPT | Performed by: PHYSICIAN ASSISTANT

## 2021-07-14 PROCEDURE — 4A023N8 MEASUREMENT OF CARDIAC SAMPLING AND PRESSURE, BILATERAL, PERCUTANEOUS APPROACH: ICD-10-PCS | Performed by: INTERNAL MEDICINE

## 2021-07-14 PROCEDURE — 93460 R&L HRT ART/VENTRICLE ANGIO: CPT | Performed by: INTERNAL MEDICINE

## 2021-07-14 PROCEDURE — 36415 COLL VENOUS BLD VENIPUNCTURE: CPT | Performed by: INTERNAL MEDICINE

## 2021-07-14 PROCEDURE — 85610 PROTHROMBIN TIME: CPT | Performed by: PHYSICIAN ASSISTANT

## 2021-07-14 PROCEDURE — 27200000 HC STERILE SUPPLY: Performed by: INTERNAL MEDICINE

## 2021-07-14 PROCEDURE — 84134 ASSAY OF PREALBUMIN: CPT | Performed by: PHYSICIAN ASSISTANT

## 2021-07-14 PROCEDURE — 85730 THROMBOPLASTIN TIME PARTIAL: CPT | Performed by: PHYSICIAN ASSISTANT

## 2021-07-14 PROCEDURE — U0002 COVID-19 LAB TEST NON-CDC: HCPCS | Performed by: INTERNAL MEDICINE

## 2021-07-14 PROCEDURE — 83036 HEMOGLOBIN GLYCOSYLATED A1C: CPT | Performed by: PHYSICIAN ASSISTANT

## 2021-07-14 PROCEDURE — 80048 BASIC METABOLIC PNL TOTAL CA: CPT | Performed by: INTERNAL MEDICINE

## 2021-07-14 PROCEDURE — C1894 INTRO/SHEATH, NON-LASER: HCPCS | Performed by: INTERNAL MEDICINE

## 2021-07-14 PROCEDURE — 93880 EXTRACRANIAL BILAT STUDY: CPT

## 2021-07-14 PROCEDURE — 71000011 HC PACU PHASE 1 EA ADDL MIN: Performed by: INTERNAL MEDICINE

## 2021-07-14 PROCEDURE — 71000001 HC PACU PHASE 1 INITIAL 30MIN: Performed by: INTERNAL MEDICINE

## 2021-07-14 PROCEDURE — 80061 LIPID PANEL: CPT | Performed by: PHYSICIAN ASSISTANT

## 2021-07-14 PROCEDURE — 93460 R&L HRT ART/VENTRICLE ANGIO: CPT | Mod: 26 | Performed by: INTERNAL MEDICINE

## 2021-07-14 PROCEDURE — 84439 ASSAY OF FREE THYROXINE: CPT | Performed by: PHYSICIAN ASSISTANT

## 2021-07-14 PROCEDURE — 63700000 HC SELF-ADMINISTRABLE DRUG: Performed by: NURSE PRACTITIONER

## 2021-07-14 PROCEDURE — 85027 COMPLETE CBC AUTOMATED: CPT | Performed by: INTERNAL MEDICINE

## 2021-07-14 PROCEDURE — B211YZZ FLUOROSCOPY OF MULTIPLE CORONARY ARTERIES USING OTHER CONTRAST: ICD-10-PCS | Performed by: INTERNAL MEDICINE

## 2021-07-14 PROCEDURE — 63700000 HC SELF-ADMINISTRABLE DRUG: Performed by: PHYSICIAN ASSISTANT

## 2021-07-14 PROCEDURE — 63700000 HC SELF-ADMINISTRABLE DRUG: Performed by: INTERNAL MEDICINE

## 2021-07-14 PROCEDURE — 63600000 HC DRUGS/DETAIL CODE: Performed by: INTERNAL MEDICINE

## 2021-07-14 PROCEDURE — 80053 COMPREHEN METABOLIC PANEL: CPT | Performed by: PHYSICIAN ASSISTANT

## 2021-07-14 PROCEDURE — 25000000 HC PHARMACY GENERAL: Performed by: INTERNAL MEDICINE

## 2021-07-14 PROCEDURE — 99152 MOD SED SAME PHYS/QHP 5/>YRS: CPT | Performed by: INTERNAL MEDICINE

## 2021-07-14 PROCEDURE — 86850 RBC ANTIBODY SCREEN: CPT

## 2021-07-14 PROCEDURE — 63700000 HC SELF-ADMINISTRABLE DRUG: Performed by: STUDENT IN AN ORGANIZED HEALTH CARE EDUCATION/TRAINING PROGRAM

## 2021-07-14 PROCEDURE — 84100 ASSAY OF PHOSPHORUS: CPT | Performed by: PHYSICIAN ASSISTANT

## 2021-07-14 PROCEDURE — 200200 PR NO CHARGE: Performed by: THORACIC SURGERY (CARDIOTHORACIC VASCULAR SURGERY)

## 2021-07-14 PROCEDURE — 84443 ASSAY THYROID STIM HORMONE: CPT | Performed by: PHYSICIAN ASSISTANT

## 2021-07-14 PROCEDURE — 99153 MOD SED SAME PHYS/QHP EA: CPT | Performed by: INTERNAL MEDICINE

## 2021-07-14 RX ORDER — CHLORHEXIDINE GLUCONATE ORAL RINSE 1.2 MG/ML
15 SOLUTION DENTAL ONCE
Status: DISCONTINUED | OUTPATIENT
Start: 2021-07-14 | End: 2021-07-15

## 2021-07-14 RX ORDER — NITROGLYCERIN 0.4 MG/1
0.4 TABLET SUBLINGUAL EVERY 5 MIN PRN
Status: DISCONTINUED | OUTPATIENT
Start: 2021-07-14 | End: 2021-07-16

## 2021-07-14 RX ORDER — MONTELUKAST SODIUM 10 MG/1
10 TABLET ORAL NIGHTLY
Status: DISCONTINUED | OUTPATIENT
Start: 2021-07-14 | End: 2021-07-20 | Stop reason: HOSPADM

## 2021-07-14 RX ORDER — ZOLPIDEM TARTRATE 5 MG/1
7.5 TABLET ORAL NIGHTLY PRN
Status: DISCONTINUED | OUTPATIENT
Start: 2021-07-14 | End: 2021-07-16 | Stop reason: HOSPADM

## 2021-07-14 RX ORDER — MUPIROCIN 20 MG/G
1 OINTMENT TOPICAL 3 TIMES DAILY
Status: DISCONTINUED | OUTPATIENT
Start: 2021-07-14 | End: 2021-07-16 | Stop reason: HOSPADM

## 2021-07-14 RX ORDER — LORAZEPAM 0.5 MG/1
0.5 TABLET ORAL EVERY 6 HOURS PRN
Status: DISCONTINUED | OUTPATIENT
Start: 2021-07-14 | End: 2021-07-20 | Stop reason: HOSPADM

## 2021-07-14 RX ORDER — HEPARIN SODIUM 1000 [USP'U]/ML
INJECTION, SOLUTION INTRAVENOUS; SUBCUTANEOUS AS NEEDED
Status: DISCONTINUED | OUTPATIENT
Start: 2021-07-14 | End: 2021-07-14 | Stop reason: HOSPADM

## 2021-07-14 RX ORDER — BUPROPION HYDROCHLORIDE 150 MG/1
300 TABLET ORAL DAILY
Status: DISCONTINUED | OUTPATIENT
Start: 2021-07-15 | End: 2021-07-20 | Stop reason: HOSPADM

## 2021-07-14 RX ORDER — ATORVASTATIN CALCIUM 40 MG/1
40 TABLET, FILM COATED ORAL NIGHTLY
Status: DISCONTINUED | OUTPATIENT
Start: 2021-07-14 | End: 2021-07-20 | Stop reason: HOSPADM

## 2021-07-14 RX ORDER — ACETAMINOPHEN 325 MG/1
650 TABLET ORAL EVERY 4 HOURS PRN
Status: DISCONTINUED | OUTPATIENT
Start: 2021-07-14 | End: 2021-07-16

## 2021-07-14 RX ORDER — DIPHENHYDRAMINE HCL 25 MG
25 CAPSULE ORAL NIGHTLY
Status: DISCONTINUED | OUTPATIENT
Start: 2021-07-14 | End: 2021-07-16 | Stop reason: HOSPADM

## 2021-07-14 RX ORDER — NICARDIPINE HCL-0.9% SOD CHLOR 1 MG/10 ML
SYRINGE (ML) INTRAVENOUS AS NEEDED
Status: DISCONTINUED | OUTPATIENT
Start: 2021-07-14 | End: 2021-07-14 | Stop reason: HOSPADM

## 2021-07-14 RX ORDER — LIDOCAINE HYDROCHLORIDE 10 MG/ML
INJECTION, SOLUTION INFILTRATION; PERINEURAL AS NEEDED
Status: DISCONTINUED | OUTPATIENT
Start: 2021-07-14 | End: 2021-07-14 | Stop reason: HOSPADM

## 2021-07-14 RX ORDER — NAPROXEN SODIUM 220 MG/1
243 TABLET, FILM COATED ORAL ONCE
Status: COMPLETED | OUTPATIENT
Start: 2021-07-14 | End: 2021-07-14

## 2021-07-14 RX ORDER — METOPROLOL TARTRATE 25 MG/1
25 TABLET, FILM COATED ORAL 2 TIMES DAILY
Status: DISCONTINUED | OUTPATIENT
Start: 2021-07-14 | End: 2021-07-16 | Stop reason: HOSPADM

## 2021-07-14 RX ORDER — IODIXANOL 320 MG/ML
INJECTION, SOLUTION INTRAVASCULAR AS NEEDED
Status: DISCONTINUED | OUTPATIENT
Start: 2021-07-14 | End: 2021-07-14 | Stop reason: HOSPADM

## 2021-07-14 RX ORDER — FENTANYL CITRATE 50 UG/ML
INJECTION, SOLUTION INTRAMUSCULAR; INTRAVENOUS AS NEEDED
Status: DISCONTINUED | OUTPATIENT
Start: 2021-07-14 | End: 2021-07-14 | Stop reason: HOSPADM

## 2021-07-14 RX ORDER — MIDAZOLAM HYDROCHLORIDE 2 MG/2ML
INJECTION, SOLUTION INTRAMUSCULAR; INTRAVENOUS AS NEEDED
Status: DISCONTINUED | OUTPATIENT
Start: 2021-07-14 | End: 2021-07-14 | Stop reason: HOSPADM

## 2021-07-14 RX ORDER — ASPIRIN 81 MG/1
81 TABLET ORAL DAILY
Status: DISCONTINUED | OUTPATIENT
Start: 2021-07-15 | End: 2021-07-16 | Stop reason: HOSPADM

## 2021-07-14 RX ORDER — IBUPROFEN 200 MG
16-32 TABLET ORAL AS NEEDED
Status: DISCONTINUED | OUTPATIENT
Start: 2021-07-14 | End: 2021-07-16

## 2021-07-14 RX ORDER — DEXTROSE 40 %
15-30 GEL (GRAM) ORAL AS NEEDED
Status: DISCONTINUED | OUTPATIENT
Start: 2021-07-14 | End: 2021-07-16

## 2021-07-14 RX ORDER — DEXTROSE 50 % IN WATER (D50W) INTRAVENOUS SYRINGE
25 AS NEEDED
Status: DISCONTINUED | OUTPATIENT
Start: 2021-07-14 | End: 2021-07-16

## 2021-07-14 RX ORDER — ASPIRIN 81 MG/1
81 TABLET ORAL DAILY
Status: DISCONTINUED | OUTPATIENT
Start: 2021-07-14 | End: 2021-07-14

## 2021-07-14 RX ORDER — ATROPINE SULFATE 0.1 MG/ML
0.5 INJECTION INTRAVENOUS EVERY 5 MIN PRN
Status: DISCONTINUED | OUTPATIENT
Start: 2021-07-14 | End: 2021-07-16

## 2021-07-14 RX ADMIN — ATORVASTATIN CALCIUM 40 MG: 40 TABLET, FILM COATED ORAL at 22:31

## 2021-07-14 RX ADMIN — ASPIRIN 243 MG: 81 TABLET, CHEWABLE ORAL at 08:51

## 2021-07-14 RX ADMIN — MUPIROCIN 1 APPLICATION.: 20 OINTMENT TOPICAL at 20:24

## 2021-07-14 RX ADMIN — METOPROLOL TARTRATE 25 MG: 25 TABLET, FILM COATED ORAL at 20:24

## 2021-07-14 RX ADMIN — DIPHENHYDRAMINE HYDROCHLORIDE 25 MG: 25 CAPSULE ORAL at 22:31

## 2021-07-14 RX ADMIN — ZOLPIDEM TARTRATE 7.5 MG: 5 TABLET ORAL at 23:01

## 2021-07-14 RX ADMIN — MONTELUKAST 10 MG: 10 TABLET, FILM COATED ORAL at 22:31

## 2021-07-14 ASSESSMENT — COGNITIVE AND FUNCTIONAL STATUS - GENERAL
DRESSING REGULAR LOWER BODY CLOTHING: 4 - NONE
EATING MEALS: 4 - NONE
WALKING IN HOSPITAL ROOM: 4 - NONE
TOILETING: 4 - NONE
DRESSING REGULAR UPPER BODY CLOTHING: 4 - NONE
MOVING TO AND FROM BED TO CHAIR: 4 - NONE
HELP NEEDED FOR PERSONAL GROOMING: 4 - NONE
STANDING UP FROM CHAIR USING ARMS: 4 - NONE
CLIMB 3 TO 5 STEPS WITH RAILING: 4 - NONE
HELP NEEDED FOR BATHING: 4 - NONE

## 2021-07-14 ASSESSMENT — PATIENT HEALTH QUESTIONNAIRE - PHQ9: SUM OF ALL RESPONSES TO PHQ9 QUESTIONS 1 & 2: 0

## 2021-07-14 NOTE — CONSULTS
Cardiac Surgery Consult Note    Subjective     Brian Dean is a 60 y.o. 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  She states she is an active women and enjoys cycling, hiking and walking her dogs.  In May of this year while on a trip to the [facility] she developed acute onset of sever dyspnea while descending a mountain after several hours of horseback riding and hiking.  She noted at the time she was weak, and felt her heart racing. She did attempt to use her albuterol inhaler without relief.  As she recounts, it was some time before her symptoms resolved and she was able to attempt hiking back up the mountain where she was eventually retrieved and rode a horse the remainder of the hike.  The remainder of the day she states she was weak and abnormally fatigued.  She had no further episodes until approximately one month later on June 9th when she was walking in [place] with a friend.  After some time she because acutely dyspneic similar to her episode in the [facility].  She sat to rest but became profoundly nauseas and began vomiting.  Again she felt her heart racing.  She was taken to [facility] ED where she states they performed several test and diagnosed her with an asthma attack and dehydration.  She was discharged home and sought further evaluation by Dr. Hall.  Additionally, she notes in retrospect she has been dyspneic with climbing stairs.  She denies chest pain now or during the above episodes. She denies orthopnea, PND, peripheral edema, palpitations, syncope, near syncope, lightheadedness, dizziness, nausea, vomiting, diarrhea, melena, unexplained weight loss or gain, abdominal pain, dysuria, frequency, hematuria.    She presented today for cardiac catheterization and was found to have    a 70% mid LAD focal stenosis just distal to a large diagonal vessel.   Dr. Nix has been consulted for possible surgical evaluation.    Medical History:   Past Medical History:   Diagnosis Date   • Allergies    • Anxiety    • Asthma-last asthma attack over 5 years ago.     • Coronary artery disease    • Coronary artery dissection    • Lipid disorder    • Parvovirus B19 infection        Surgical History:   Past Surgical History:   Procedure Laterality Date   • REDUCTION MAMMAPLASTY         Allergies: Sulfa (sulfonamide antibiotics) and Sulfamethoxazole-trimethoprim    Current Inpatient Medications   Medication Dose Route Frequency Provider Last Rate Last Admin   • acetaminophen (TYLENOL) tablet 650 mg  650 mg oral q4h PRN Rina Simmons CRNP       • [START ON 7/15/2021] aspirin enteric coated tablet 81 mg  81 mg oral Daily Rina Simmons CRNP       • atorvastatin (LIPITOR) tablet 40 mg  40 mg oral Nightly Rina Simmons CRNP       • atropine injection 0.5 mg  0.5 mg intravenous q5 min PRN Rina Simmons CRNP       • [START ON 7/15/2021] buPROPion XL (WELLBUTRIN XL) 24 hr ER tablet 300 mg  300 mg oral Daily Rina Simmons CRNP       • glucose chewable tablet 16-32 g of dextrose  16-32 g of dextrose oral PRN Rina Simmons CRNP        Or   • dextrose 40 % oral gel 15-30 g of dextrose  15-30 g of dextrose oral PRN Rina Simmons CRNP        Or   • glucagon (GLUCAGEN) injection 1 mg  1 mg intramuscular PRN Rina Simmons CRNP        Or   • dextrose in water injection 12.5 g  25 mL intravenous PRN Rina Simmons CRNP       • diphenhydrAMINE (BENADRYL) capsule 25 mg  25 mg oral Nightly Rina Simmons CRNP       • [START ON 7/15/2021] DULoxetine (CYMBALTA) capsule,delayed release(DR/EC) 90 mg  90 mg oral Daily Rina Simmons CRNP       • LORazepam (ATIVAN) tablet 0.5 mg  0.5 mg oral q6h PRN [place]  MISAEL Mraadiaga       • metoprolol tartrate (LOPRESSOR) tablet 25 mg  25 mg oral BID Rina Simmons CRNP       • montelukast (SINGULAIR) tablet 10 mg  10 mg oral Nightly Rina Simmons CRNP       • nitroglycerin (NITROSTAT) SL tablet 0.4 mg  0.4 mg sublingual q5 min PRN Rina Simmons CRNP            Social History:   with children.    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   Denies tobacco/alcohol abuse.   Active lifestyle. Hiking, cycling, gardening, walking her dogs.     Family History:   **Patient donor conception.  Biological father and siblings with premature CAD history. **    Family History   Problem Relation Age of Onset   • Hypertension Biological Mother    • Heart disease Biological Brother      Review of Systems  10 point review of systems negative other then those stated in the HPI.    Vital signs in last 24 hours:  Temp:  [36.5 °C (97.7 °F)] 36.5 °C (97.7 °F)  Heart Rate:  [78-96] 84  Resp:  [13-30] 23  BP: (117-146)/(58-82) 124/59    Objective     Physical Exam  General: A&O x 3. NAD  Head and Neck: Normocephalic, atraumatic.  No carotid bruit.   Cardiovascular: RRR, no murmur  Respiratory: Slightly decreased bilat bases, no wheeze or rhonci.   Abdomen: Soft, non-tender, non-distended.  Normoactive bowel sounds.  No organomegaly appreciated.   Extremities: No lower extremity edema.  Palpable DP and PT pulses bilaterally.   Skin: No lesions or rashes.     Labs  CBC Results         07/14/21                          0753           WBC 5.25           RBC 4.24           HGB 12.5           HCT 38.8           MCV 91.5           MCH 29.5           MCHC 32.2                                      BMP Results         07/14/21                          0753                      K 5.0           Cl 101           CO2 23           Glucose 102           BUN 17           Creatinine 1.0           Calcium 9.7           Anion Gap 13           EGFR 56.2           Comment for K  at 0753 on 07/14/21: SLIGHT HEMOLYSIS, RESULT MAY BE INCREASED.          Cardiac catheterization:  Conclusion    LHC/Coronary Angiogram Findings  1. 70% mid LAD focal stenosis just distal to a large diagonal vessel.  The borders of the lesion appear smooth and the lack of other angiographic disease suggests FMD or intramural hematoma as etiology rather than atherosclerotic disease.  2. Low -normal LVEDP     RHC Findings  1. Low right and left heart filling pressures (RA 1, PA 30/8 (15), and PCW 3 mm Hg).  2. Cardiac output and index of 6.32 L/min and 3.9 L/min/m2 respectively.      RECOMMENDATIONS:    1.  Admit to hospital. Further discussion and evaluation to determine management. Review of recent CT coronary imaging from OSH.        ECG/Telemetry  NSR with evidence of age indeterminate anteroseptal infarct.    Cardiothoracic Assessment and Plan:  Coronary artery disease involving native coronary artery of native heart without angina pectoris  Assessment & Plan  Demand ischemic,  anginal symptoms  Focal stenosis of mid LAD distal to diagonal branch with intramural hematoma vs. FMD per cardiac cath report.   Dr. Nix consulted for possible coronary revascularization.   Will obtain routine preoperative testing.     Coronary artery dissection  Assessment & Plan  Focal stenosis distal to Diagonal branch with intramural hematoma vs. FMD per cardiac cath report.   Dr. Nix has been consulted for possible coronary revascularization given ishemic anginal symptoms.   Will obtain routine preoperative workup.     Anxiety  Assessment & Plan  Continue ativan, cymbalta and welbutrin.     Seasonal allergies  Assessment & Plan  On benadryl/singulair.    Dyslipidemia  Assessment & Plan  Continue statin.

## 2021-07-14 NOTE — Clinical Note
Patient placed on procedure table in supine position with left arm extended. Positioning devices: safety strap applied.

## 2021-07-14 NOTE — PRE-PROCEDURE NOTE
Cardiac Cath Lab Pre-procedure Note    - Patient was seen and examined at bedside.  - The patient's chart and all data was reviewed.  - The procedure, treatment alternatives, risks and benefits were explained with specific risks discussed.  - Patient was consented for cardiac cath procedure and possible PCI.  - Patient's case was found appropriate for dual antiplatelet therapy.    Patient's clinical presentation to the cardiac cath lab: stable ischemic symptoms.     Patient is at risk for acute myocardial infarction and vascular complications due to the presence of severe peripheral arterial disease in the iliac/femoral vessels increasing the risk of hematoma, retroperitoneal bleeding, pseudo-aneurysms and arteriovenous fistula formation.   Patient appears to be managing well.                 Pre-sedation assessment  ASA 3   Mallampati class: III - soft palate, base of uvula visible.

## 2021-07-14 NOTE — POST-PROCEDURE NOTE
Cath lab post procedure note:     Procedure: RHC/LHC/coronary angiogram  Sedation: Versed and Fentanyl   Access site: left radial artery, right brachial vein  Hemostasis achieved using: TR band, manual pressure.  EBL: 10cc  Findings :     LHC/Coronary Angiogram Findings  1. 70% mid LAD focal stenosis just distal to a large diagonal vessel.  The borders of the lesion appear smooth and the lack of other angiographic disease suggests FMD or intramural hematoma as etiology rather than atherosclerotic disease.  2. Low -normal LVEDP    RHC Findings  1. Low right and left heart filling pressures (RA 1, PA 30/8 (15), and PCW 3 mm Hg).  2. Cardiac output and index of 6.32 L/min and 3.9 L/min/m2 respectively.      Complications: None.   Plan and Instructions:   1.  Admit to hospital. Further discussion and evaluation to determine management. Review of recent CT coronary imaging from OSH.       Full report to follow.     Dallas Boateng MD

## 2021-07-14 NOTE — Clinical Note
Closure device placed for the left brachial vein. Closure pressure manually applied. Hemostasis achieved.

## 2021-07-14 NOTE — ASSESSMENT & PLAN NOTE
Focal stenosis distal to Diagonal branch with intramural hematoma vs. FMD per cardiac cath report.   Dr. Nix has been consulted for possible coronary revascularization given NSTEMI and anginal symptoms.   Will obtain routine preoperative workup.

## 2021-07-14 NOTE — NURSING NOTE
Pt arrived on unit from cath lab. Pt AAOx4, VSS Pt HR 73 SR on tele monitor. Left arm cath site assessed- 2x2 guaze and tegaderm dry and intact.  Plan of care reviewed with pt; pt verbalized understanding.  Pt oriented to room and use of call bell.  Pt awaiting US of carotid arteries. Pt in bed; bed in lowest position, call bell within reach, and spouse at bedside.  Will continue to monitor.

## 2021-07-14 NOTE — Clinical Note
The right groin, right radial and right brachial was site clipped, marked  and prepped with ChloraPrep. The patient was draped in a sterile fashion after allowing for the recommended dry time.

## 2021-07-14 NOTE — ASSESSMENT & PLAN NOTE
POD #2 s/p Robo CABG (HEARD to LAD)  Continue Aspirin, plavix, statin and BB  Continue colchicine for PPS prophylaxis  Pepcid for GI prophylaxis    Bulb upper and DC lower chest tube  DC eng  DC IJ and [place] after Levo weaned

## 2021-07-15 ENCOUNTER — ANESTHESIA EVENT (OUTPATIENT)
Dept: OPERATING ROOM | Facility: HOSPITAL | Age: 61
DRG: 233 | End: 2021-07-15
Payer: COMMERCIAL

## 2021-07-15 ENCOUNTER — APPOINTMENT (OUTPATIENT)
Dept: CARDIOLOGY | Facility: HOSPITAL | Age: 61
DRG: 233 | End: 2021-07-15
Attending: PHYSICIAN ASSISTANT
Payer: COMMERCIAL

## 2021-07-15 ENCOUNTER — APPOINTMENT (OUTPATIENT)
Dept: RADIOLOGY | Facility: HOSPITAL | Age: 61
DRG: 233 | End: 2021-07-15
Attending: PHYSICIAN ASSISTANT
Payer: COMMERCIAL

## 2021-07-15 LAB
ABO + RH BLD: NORMAL
AORTIC ROOT ANNULUS - M-MODE: 2.8 CM
AORTIC VALVE MEAN VELOCITY: 0.8 M/S
AORTIC VALVE VELOCITY TIME INTEGRAL: 23.7 CM
AV MEAN GRADIENT: 3 MMHG
AV PEAK GRADIENT: 5 MMHG
AV PEAK VELOCITY-S: 1.09 M/S
AV VALVE AREA: 2.23 CM2
BLD GP AB SCN SERPL QL: NEGATIVE
BNP SERPL-MCNC: 51 PG/ML
BSA FOR ECHO PROCEDURE: 1.64 M2
CUSP SEPARATION: 1.8 CM
D AG BLD QL: POSITIVE
DOP CALC LVOT STROKE VOLUME: 52.75 ML
E WAVE DECELERATION TIME: 239 MS
E/A RATIO: 0.9
E/E' RATIO: 12.2
E/LAT E' RATIO: 9.5
EDV (BP): 66.8 CM3
EF (A4C): 62 %
EF A2C: 65.1 %
EJECTION FRACTION: 62.6 %
ESV (BP): 25 CM3
FRACTIONAL SHORTENING: 34.5 %
GLOBAL LONGITUDINAL STRAIN: -19.1 %
INTERVENTRICULAR SEPTUM: 0.89 CM
LA ESV (BP): 38.9 CM3
LA ESV INDEX (A2C): 22.8 CM3/M2
LA ESV INDEX (BP): 23.72 CM3/M2
LAAS-AP2: 15.1 CM2
LAAS-AP4: 13.2 CM2
LABORATORY COMMENT REPORT: NORMAL
LAD 2D: 3.3 CM
LALD A4C: 4.14 CM
LALD A4C: 4.93 CM
LAV-S: 37.4 CM3
LEFT ATRIUM VOLUME INDEX: 20.91 CM3/M2
LEFT ATRIUM VOLUME: 34.3 CM3
LEFT INTERNAL DIMENSION IN SYSTOLE: 2.28 CM (ref 2.34–3.53)
LEFT VENTRICLE DIASTOLIC VOLUME INDEX: 49.15 CM3/M2
LEFT VENTRICLE DIASTOLIC VOLUME: 80.6 CM3
LEFT VENTRICLE SYSTOLIC VOLUME INDEX: 18.66 CM3/M2
LEFT VENTRICLE SYSTOLIC VOLUME: 30.6 CM3
LEFT VENTRICULAR INTERNAL DIMENSION IN DIASTOLE: 3.48 CM (ref 3.93–5.46)
LEFT VENTRICULAR POSTERIOR WALL IN END DIASTOLE: 1 CM (ref 0.51–0.95)
LV DIASTOLIC VOLUME: 54.7 CM3
LV ESV (APICAL 2 CHAMBER): 19.1 CM3
LVAD-AP2: 22.1 CM2
LVAD-AP4: 27.2 CM2
LVAS-AP2: 11.4 CM2
LVAS-AP4: 15.3 CM2
LVEDVI(A2C): 33.35 CM3/M2
LVEDVI(BP): 40.73 CM3/M2
LVESVI(A2C): 11.65 CM3/M2
LVESVI(BP): 15.24 CM3/M2
LVLD-AP2: 7.52 CM
LVLD-AP4: 7.64 CM
LVLS-AP2: 5.99 CM
LVLS-AP4: 6.49 CM
LVOT 2D: 2 CM
LVOT A: 3.14 CM2
LVOT MG: 1 MMHG
LVOT MV: 0.43 M/S
LVOT PEAK VELOCITY: 0.72 M/S
LVOT PG: 2 MMHG
LVOT VTI: 16.8 CM
MV E'TISSUE VEL-LAT: 0.08 M/S
MV E'TISSUE VEL-MED: 0.06 M/S
MV PEAK A VEL: 0.77 M/S
MV PEAK E VEL: 0.73 M/S
MV VALVE AREA P 1/2 METHOD: 3.14 CM2
POSTERIOR WALL: 1 CM
PULM VEIN S/D RATIO: 2.1
PV PEAK D VEL: 0.3 M/S
PV PEAK S VEL: 0.64 M/S
RVOT VMAX: 0.57 M/S
RVOT VTI: 12.3 CM
SEPTAL TISSUE DOPPLER FREE WALL LATE DIA VELOCITY (APICAL 4 CHAMBER VIEW): 0.12 M/S
SPECIMEN EXP DATE BLD: NORMAL
TR MAX PG: 18 MMHG
TRICUSPID VALVE PEAK REGURGITATION VELOCITY: 2.13 M/S
Z-SCORE OF LEFT VENTRICULAR DIMENSION IN END DIASTOLE: -2.86
Z-SCORE OF LEFT VENTRICULAR DIMENSION IN END SYSTOLE: -1.82
Z-SCORE OF LEFT VENTRICULAR POSTERIOR WALL IN END DIASTOLE: 1.89

## 2021-07-15 PROCEDURE — 200200 PR NO CHARGE: Performed by: INTERNAL MEDICINE

## 2021-07-15 PROCEDURE — 36415 COLL VENOUS BLD VENIPUNCTURE: CPT | Performed by: NURSE PRACTITIONER

## 2021-07-15 PROCEDURE — 86900 BLOOD TYPING SEROLOGIC ABO: CPT

## 2021-07-15 PROCEDURE — 93306 TTE W/DOPPLER COMPLETE: CPT | Mod: 26 | Performed by: INTERNAL MEDICINE

## 2021-07-15 PROCEDURE — 63700000 HC SELF-ADMINISTRABLE DRUG: Performed by: STUDENT IN AN ORGANIZED HEALTH CARE EDUCATION/TRAINING PROGRAM

## 2021-07-15 PROCEDURE — 99226 PR SBSQ OBSERVATION CARE/DAY 35 MINUTES: CPT | Performed by: INTERNAL MEDICINE

## 2021-07-15 PROCEDURE — 86920 COMPATIBILITY TEST SPIN: CPT | Mod: 91

## 2021-07-15 PROCEDURE — 86920 COMPATIBILITY TEST SPIN: CPT

## 2021-07-15 PROCEDURE — 71046 X-RAY EXAM CHEST 2 VIEWS: CPT

## 2021-07-15 PROCEDURE — 63700000 HC SELF-ADMINISTRABLE DRUG: Performed by: PHYSICIAN ASSISTANT

## 2021-07-15 PROCEDURE — 93356 MYOCRD STRAIN IMG SPCKL TRCK: CPT | Performed by: INTERNAL MEDICINE

## 2021-07-15 PROCEDURE — 83880 ASSAY OF NATRIURETIC PEPTIDE: CPT | Performed by: NURSE PRACTITIONER

## 2021-07-15 PROCEDURE — 63700000 HC SELF-ADMINISTRABLE DRUG: Performed by: NURSE PRACTITIONER

## 2021-07-15 PROCEDURE — 93306 TTE W/DOPPLER COMPLETE: CPT

## 2021-07-15 RX ORDER — CHLORHEXIDINE GLUCONATE ORAL RINSE 1.2 MG/ML
15 SOLUTION DENTAL ONCE
Status: COMPLETED | OUTPATIENT
Start: 2021-07-16 | End: 2021-07-16

## 2021-07-15 RX ORDER — PANTOPRAZOLE SODIUM 40 MG/1
40 TABLET, DELAYED RELEASE ORAL DAILY
Status: COMPLETED | OUTPATIENT
Start: 2021-07-15 | End: 2021-07-15

## 2021-07-15 RX ORDER — CEFAZOLIN SODIUM 2 G/50ML
2 SOLUTION INTRAVENOUS
Status: DISCONTINUED | OUTPATIENT
Start: 2021-07-16 | End: 2021-07-16

## 2021-07-15 RX ADMIN — DULOXETINE 90 MG: 60 CAPSULE, DELAYED RELEASE ORAL at 10:57

## 2021-07-15 RX ADMIN — PANTOPRAZOLE SODIUM 40 MG: 40 TABLET, DELAYED RELEASE ORAL at 19:49

## 2021-07-15 RX ADMIN — LORAZEPAM 0.5 MG: 0.5 TABLET ORAL at 23:53

## 2021-07-15 RX ADMIN — LORAZEPAM 0.5 MG: 0.5 TABLET ORAL at 18:12

## 2021-07-15 RX ADMIN — METOPROLOL TARTRATE 25 MG: 25 TABLET, FILM COATED ORAL at 10:58

## 2021-07-15 RX ADMIN — ATORVASTATIN CALCIUM 40 MG: 40 TABLET, FILM COATED ORAL at 21:28

## 2021-07-15 RX ADMIN — MUPIROCIN 1 APPLICATION.: 20 OINTMENT TOPICAL at 11:01

## 2021-07-15 RX ADMIN — METOPROLOL TARTRATE 25 MG: 25 TABLET, FILM COATED ORAL at 19:48

## 2021-07-15 RX ADMIN — LORAZEPAM 0.5 MG: 0.5 TABLET ORAL at 10:58

## 2021-07-15 RX ADMIN — MUPIROCIN 1 APPLICATION.: 20 OINTMENT TOPICAL at 15:57

## 2021-07-15 RX ADMIN — MONTELUKAST 10 MG: 10 TABLET, FILM COATED ORAL at 21:28

## 2021-07-15 RX ADMIN — DIPHENHYDRAMINE HYDROCHLORIDE 25 MG: 25 CAPSULE ORAL at 21:28

## 2021-07-15 RX ADMIN — Medication 81 MG: at 10:58

## 2021-07-15 RX ADMIN — MUPIROCIN 1 APPLICATION.: 20 OINTMENT TOPICAL at 19:49

## 2021-07-15 RX ADMIN — BUPROPION HYDROCHLORIDE 300 MG: 300 TABLET, EXTENDED RELEASE ORAL at 10:58

## 2021-07-15 RX ADMIN — ZOLPIDEM TARTRATE 7.5 MG: 5 TABLET ORAL at 21:28

## 2021-07-15 NOTE — PATIENT CARE CONFERENCE
Care Progression Rounds Note  Date: 7/15/2021  Time: 12:04 PM     Patient Name: Brian Dean     Medical Record Number: [id number]   YOB: 1960  Sex: Female      Room/Bed: 0160    Admitting Diagnosis: Non-ST elevation myocardial infarction (NSTEMI) (CMS/Formerly [facility]) [I21.4]  Coronary artery disease involving native coronary artery of native heart without angina pectoris [I25.10]  CAD (coronary artery disease) [I25.10]   Admit Date/Time: 7/14/2021  7:34 AM    Primary Diagnosis: No Principal Problem: There is no principal problem currently on the Problem List. Please update the Problem List and refresh.  Principal Problem: No Principal Problem: There is no principal problem currently on the Problem List. Please update the Problem List and refresh.    GMLOS: pending  Anticipated Discharge Date: 7/17/2021    AM-PAC:  Mobility Score: 24    Discharge Planning:       Barriers to Discharge:  Barriers to Discharge: Medical issues not resolved, Test pending    Participants:  nursing, social work/services, , physician, physical therapy

## 2021-07-15 NOTE — PROGRESS NOTES
Cardiac Surgery Pre-Operative Evaluation:    BP Measurements:    Right Arm: 108/52  Left Arm: 103/53

## 2021-07-15 NOTE — PLAN OF CARE
Problem: Adult Inpatient Plan of Care  Goal: Patient-Specific Goal (Individualized)  Outcome: Progressing  Flowsheets (Taken 7/15/2021 0016)  Patient-Specific Goals (Include Timeframe): pt will remain hemodynamcally stable at this time  Individualized Care Needs: none  Anxieties, Fears or Concerns: none   Plan of Care Review  Plan of Care Reviewed With: patient  Progress: improving  Outcome Summary: Rec'd pt at 2300- oriented x4. Pt is sleeping but easily arousable. VSS. Pt offers no complaints at this time. wll continue to monior

## 2021-07-15 NOTE — PLAN OF CARE
Problem: Adult Inpatient Plan of Care  Goal: Plan of Care Review  Outcome: Progressing  Flowsheets (Taken 7/15/2021 0943)  Progress: improving  Plan of Care Reviewed With: patient  Outcome Summary: pt roman3 will plan for cardiac surgery in am     Problem: Adult Inpatient Plan of Care  Goal: Patient-Specific Goal (Individualized)  Flowsheets (Taken 7/15/2021 0943)  Patient-Specific Goals (Include Timeframe): pt will be d.c when stable  Individualized Care Needs: none  Anxieties, Fears or Concerns: none   Plan of Care Review  Plan of Care Reviewed With: patient  Progress: improving  Outcome Summary: pt roman3 will plan for cardiac surgery in am

## 2021-07-15 NOTE — NURSING NOTE
Pt resting in bed, left radial pulse cath site assessed, pressure drsg still in place, no s/s of bleeding, denies pain/ discomfort, CHG bath completed per order.

## 2021-07-15 NOTE — PLAN OF CARE
"  Problem: Adult Inpatient Plan of Care  Goal: Readiness for Transition of Care  Intervention: Mutually Develop Transition Plan  Flowsheets (Taken 7/15/2021 1412)  Anticipated Discharge Disposition: home with assistance  Equipment Needed After Discharge: none  Discharge Coordination/Progress: Met with patient and adult son ( on the phone at bedside) to discuss discharge planning. Patient reports that she is \"self-employed\" and leads an active lifestyle. She is completely independent in all activities. Patient understands that the heart catheterization revealed focal stenosis distal to diagnoal branch and that recommendation is for robotic CABG by Dr. Nix. Patient states she is scheduled for surgery tomorrow. Patient said she has plenty of support at home and declined any home care needs. CM to continue to follow until discharge.  Assistive Device/Animal Currently Used at Home: none  Concerns Comments:   Patient admitted with angina and dyspnea   diagnosed with NSTEMI and underwent heart catheterization. Prior medical history includes a MI secondary to a spontaneous coronary artery dissection in 2005   HDL   asthma, anxiety, and seasonal allergies. She is followed by [facility] every 6 months.  Current Discharge Risk: chronically ill  Readmission Within the Last 30 Days: no previous admission in last 30 days  Patient/Family Anticipated Services at Transition: none  Patient/Family Anticipates Transition to: home with family  Transportation Anticipated: family or friend will provide  Concerns to be Addressed:   care coordination/care conferences   discharge planning  Offered/Gave Vendor List: no     "

## 2021-07-15 NOTE — PROGRESS NOTES
Situation reviewed in detail with patient and Dr. Schofield  She understands that robotic LIMA will restore blood flow past her stenosed LAD - likely cause of her symptoms  But does nothing to correct the underlying process and risk for recurrent hematomas and dissections in either this vessel or other vessels.

## 2021-07-15 NOTE — PROGRESS NOTES
Cardiology Daily Progress Note    Subjective/Objective:  No severe discomfort overnight but the patient is at rest    Review of systems: No headaches no visual disturbances no change in bowel or bladder habits.  11 other systems reviewed and were noncontributory    Physical exam: Middle-aged female in no acute distress  Vital signs stable  Lungs clear  Cardiac regular rate tones are of fair quality  Abdomen soft  Extremities distal pulses are intact left radial cath site clean and dry  Neuro no focal motor or sensory deficits mood and affect are appropriate    Laboratory data: All pertinent lab studies were personally reviewed  Carotid ultrasound evaluation from yesterday was reviewed and is unremarkable    Assessment/Plan   1 .  90% obstruction of the ostium of the LAD.  Patient's cardiac catheterization from yesterday and prior coronary CTA were reviewed in detail and discussed with my interventional partners as well as CT surgery.  It is our unanimous agreement that the patient would be best served with robotic CABG (LIMA to the LAD).  The advantage of this over percutaneous intervention was discussed in detail with the cardiac team as well as the patient and Dr. Eleazar Hall.  All are aware that this may not be a perfect solution, and that the patient may be subject to further events in the future even if this is not fibromuscular dysplasia.  Carotid ultrasound evaluation was satisfactory.    I have advised the patient to remain in the hospital until robotic surgery can be completed.  This is because of her recent symptom complex.  Patient is willing to undergo surgery.  All questions were answered.    Continue aspirin and high intensity statin therapy.  Would continue metoprolol       Expected Discharge Date:  7/15/2021

## 2021-07-16 ENCOUNTER — APPOINTMENT (INPATIENT)
Dept: RADIOLOGY | Facility: HOSPITAL | Age: 61
DRG: 233 | End: 2021-07-16
Attending: PHYSICIAN ASSISTANT
Payer: COMMERCIAL

## 2021-07-16 ENCOUNTER — ANESTHESIA (OUTPATIENT)
Dept: OPERATING ROOM | Facility: HOSPITAL | Age: 61
DRG: 233 | End: 2021-07-16
Payer: COMMERCIAL

## 2021-07-16 ENCOUNTER — APPOINTMENT (INPATIENT)
Dept: RADIOLOGY | Facility: HOSPITAL | Age: 61
DRG: 233 | End: 2021-07-16
Attending: NURSE PRACTITIONER
Payer: COMMERCIAL

## 2021-07-16 LAB
ANION GAP SERPL CALC-SCNC: 11 MEQ/L (ref 3–15)
ANION GAP SERPL CALC-SCNC: 9 MEQ/L (ref 3–15)
APTT PPP: 29 SEC (ref 23–35)
BASE EXCESS BLDA CALC-SCNC: -0.8 MEQ/L
BASE EXCESS BLDA CALC-SCNC: -1.1 MEQ/L
BASE EXCESS BLDA CALC-SCNC: -1.1 MEQ/L
BASE EXCESS BLDA CALC-SCNC: -1.2 MEQ/L
BASE EXCESS BLDA CALC-SCNC: -1.3 MEQ/L
BASE EXCESS BLDA CALC-SCNC: -1.6 MEQ/L
BASE EXCESS BLDA CALC-SCNC: -3.1 MEQ/L
BASE EXCESS BLDA CALC-SCNC: -3.2 MEQ/L
BASE EXCESS BLDA CALC-SCNC: -4.4 MEQ/L
BASE EXCESS BLDA CALC-SCNC: 0.3 MEQ/L
BUN SERPL-MCNC: 9 MG/DL (ref 8–20)
BUN SERPL-MCNC: 9 MG/DL (ref 8–20)
CA-I BLD-SCNC: 1.08 MMOL/L (ref 1.15–1.27)
CA-I BLD-SCNC: 1.13 MMOL/L (ref 1.15–1.27)
CA-I BLD-SCNC: 1.15 MMOL/L (ref 1.15–1.27)
CA-I BLD-SCNC: 1.15 MMOL/L (ref 1.15–1.27)
CA-I BLD-SCNC: 1.16 MMOL/L (ref 1.15–1.27)
CA-I BLD-SCNC: 1.17 MMOL/L (ref 1.15–1.27)
CA-I BLD-SCNC: 1.19 MMOL/L (ref 1.15–1.27)
CA-I BLD-SCNC: 1.26 MMOL/L (ref 1.15–1.27)
CA-I BLD-SCNC: 1.26 MMOL/L (ref 1.15–1.27)
CA-I BLD-SCNC: 1.29 MMOL/L (ref 1.15–1.27)
CALCIUM SERPL-MCNC: 8 MG/DL (ref 8.9–10.3)
CALCIUM SERPL-MCNC: 9 MG/DL (ref 8.9–10.3)
CHLORIDE BLDA-SCNC: 103 MEQ/L (ref 98–109)
CHLORIDE BLDA-SCNC: 104 MEQ/L (ref 98–109)
CHLORIDE BLDA-SCNC: 105 MEQ/L (ref 98–109)
CHLORIDE SERPL-SCNC: 103 MEQ/L (ref 98–109)
CHLORIDE SERPL-SCNC: 103 MEQ/L (ref 98–109)
CO2 BLDA-SCNC: 23 MEQ/L (ref 22–32)
CO2 BLDA-SCNC: 24 MEQ/L (ref 22–32)
CO2 BLDA-SCNC: 24 MEQ/L (ref 22–32)
CO2 BLDA-SCNC: 25 MEQ/L (ref 22–32)
CO2 BLDA-SCNC: 27 MEQ/L (ref 22–32)
CO2 BLDA-SCNC: 28 MEQ/L (ref 22–32)
CO2 SERPL-SCNC: 20 MEQ/L (ref 22–32)
CO2 SERPL-SCNC: 26 MEQ/L (ref 22–32)
CREAT SERPL-MCNC: 0.8 MG/DL (ref 0.6–1.1)
CREAT SERPL-MCNC: 0.8 MG/DL (ref 0.6–1.1)
ERYTHROCYTE [DISTWIDTH] IN BLOOD BY AUTOMATED COUNT: 13.1 % (ref 11.7–14.4)
ERYTHROCYTE [DISTWIDTH] IN BLOOD BY AUTOMATED COUNT: 13.2 % (ref 11.7–14.4)
GFR SERPL CREATININE-BSD FRML MDRD: >60 ML/MIN/1.73M*2
GFR SERPL CREATININE-BSD FRML MDRD: >60 ML/MIN/1.73M*2
GLUCOSE BLDA-MCNC: 114 MG/DL (ref 70–99)
GLUCOSE BLDA-MCNC: 128 MG/DL (ref 70–99)
GLUCOSE BLDA-MCNC: 130 MG/DL (ref 70–99)
GLUCOSE BLDA-MCNC: 139 MG/DL (ref 70–99)
GLUCOSE BLDA-MCNC: 140 MG/DL (ref 70–99)
GLUCOSE BLDA-MCNC: 143 MG/DL (ref 70–99)
GLUCOSE BLDA-MCNC: 144 MG/DL (ref 70–99)
GLUCOSE BLDA-MCNC: 151 MG/DL (ref 70–99)
GLUCOSE BLDA-MCNC: 152 MG/DL (ref 70–99)
GLUCOSE BLDA-MCNC: 162 MG/DL (ref 70–99)
GLUCOSE SERPL-MCNC: 137 MG/DL (ref 70–99)
GLUCOSE SERPL-MCNC: 149 MG/DL (ref 70–99)
HCO3 BLDA-SCNC: 22 MEQ/L (ref 21–28)
HCO3 BLDA-SCNC: 23 MEQ/L (ref 21–28)
HCO3 BLDA-SCNC: 23 MEQ/L (ref 21–28)
HCO3 BLDA-SCNC: 24 MEQ/L (ref 21–28)
HCO3 BLDA-SCNC: 25 MEQ/L (ref 21–28)
HCT VFR BLDCO AUTO: 23.1 % (ref 35–45)
HCT VFR BLDCO AUTO: 29.3 % (ref 35–45)
HGB BLD-MCNC: 10 G/DL (ref 11.8–15.7)
HGB BLD-MCNC: 7.7 G/DL (ref 11.8–15.7)
HGB BLDA-MCNC: 10.3 G/DL (ref 12–16)
HGB BLDA-MCNC: 11.9 G/DL (ref 12–16)
HGB BLDA-MCNC: 8.2 G/DL (ref 12–16)
HGB BLDA-MCNC: 8.4 G/DL (ref 12–16)
HGB BLDA-MCNC: 8.4 G/DL (ref 12–16)
HGB BLDA-MCNC: 9.6 G/DL (ref 12–16)
HGB BLDA-MCNC: 9.7 G/DL (ref 12–16)
HGB BLDA-MCNC: 9.9 G/DL (ref 12–16)
INR PPP: 1.2
LACTATE BLDA-SCNC: 0.5 MMOL/L (ref 0.4–1.6)
LACTATE BLDA-SCNC: 0.6 MMOL/L (ref 0.4–1.6)
LACTATE BLDA-SCNC: 0.7 MMOL/L (ref 0.4–1.6)
LACTATE BLDA-SCNC: 0.7 MMOL/L (ref 0.4–1.6)
LACTATE BLDA-SCNC: 0.8 MMOL/L (ref 0.4–1.6)
LACTATE BLDA-SCNC: 0.9 MMOL/L (ref 0.4–1.6)
LACTATE BLDA-SCNC: 1 MMOL/L (ref 0.4–1.6)
MAGNESIUM SERPL-MCNC: 1.6 MG/DL (ref 1.8–2.5)
MAGNESIUM SERPL-MCNC: 2.7 MG/DL (ref 1.8–2.5)
MCH RBC QN AUTO: 29.6 PG (ref 28–33.2)
MCH RBC QN AUTO: 30 PG (ref 28–33.2)
MCHC RBC AUTO-ENTMCNC: 33.3 G/DL (ref 32.2–35.5)
MCHC RBC AUTO-ENTMCNC: 34.1 G/DL (ref 32.2–35.5)
MCV RBC AUTO: 88 FL (ref 83–98)
MCV RBC AUTO: 88.8 FL (ref 83–98)
PCO2 BLDA: 32 MM HG (ref 35–48)
PCO2 BLDA: 33 MM HG (ref 35–48)
PCO2 BLDA: 37 MM HG (ref 35–48)
PCO2 BLDA: 39 MM HG (ref 35–48)
PCO2 BLDA: 40 MM HG (ref 35–48)
PCO2 BLDA: 45 MM HG (ref 35–48)
PCO2 BLDA: 48 MM HG (ref 35–48)
PCO2 BLDA: 48 MM HG (ref 35–48)
PCO2 BLDA: 52 MM HG (ref 35–48)
PCO2 BLDA: 53 MM HG (ref 35–48)
PDW BLD AUTO: 10.5 FL (ref 9.4–12.3)
PDW BLD AUTO: 10.5 FL (ref 9.4–12.3)
PH BLDA: 7.28 PH (ref 7.35–7.45)
PH BLDA: 7.3 PH (ref 7.35–7.45)
PH BLDA: 7.32 PH (ref 7.35–7.45)
PH BLDA: 7.38 PH (ref 7.35–7.45)
PH BLDA: 7.39 PH (ref 7.35–7.45)
PH BLDA: 7.41 PH (ref 7.35–7.45)
PH BLDA: 7.44 PH (ref 7.35–7.45)
PH BLDA: 7.47 PH (ref 7.35–7.45)
PHOSPHATE SERPL-MCNC: 3.1 MG/DL (ref 2.4–4.7)
PLATELET # BLD AUTO: 191 K/UL (ref 150–369)
PLATELET # BLD AUTO: 251 K/UL (ref 150–369)
PO2 BLDA: 102 MM HG (ref 83–100)
PO2 BLDA: 155 MM HG (ref 83–100)
PO2 BLDA: 165 MM HG (ref 83–100)
PO2 BLDA: 182 MM HG (ref 83–100)
PO2 BLDA: 190 MM HG (ref 83–100)
PO2 BLDA: 224 MM HG (ref 83–100)
PO2 BLDA: 230 MM HG (ref 83–100)
PO2 BLDA: 270 MM HG (ref 83–100)
PO2 BLDA: 333 MM HG (ref 83–100)
PO2 BLDA: 499 MM HG (ref 83–100)
POCT ACT-HR: 165 SEC (ref 100–140)
POCT ACT-HR: 406 SEC (ref 100–140)
POCT PATIENT TEMPERATURE: 98.6 °F (ref 97–99)
POCT TEST (BLD GAS): ABNORMAL
POCT TEST: ABNORMAL
POCT TEST: ABNORMAL
POTASSIUM BLDA-SCNC: 3.9 MEQ/L (ref 3.4–4.5)
POTASSIUM BLDA-SCNC: 4.1 MEQ/L (ref 3.4–4.5)
POTASSIUM BLDA-SCNC: 4.3 MEQ/L (ref 3.4–4.5)
POTASSIUM BLDA-SCNC: 4.4 MEQ/L (ref 3.4–4.5)
POTASSIUM BLDA-SCNC: 4.5 MEQ/L (ref 3.4–4.5)
POTASSIUM BLDA-SCNC: 4.6 MEQ/L (ref 3.4–4.5)
POTASSIUM BLDA-SCNC: 4.8 MEQ/L (ref 3.4–4.5)
POTASSIUM BLDA-SCNC: 5 MEQ/L (ref 3.4–4.5)
POTASSIUM SERPL-SCNC: 3.9 MEQ/L (ref 3.6–5.1)
POTASSIUM SERPL-SCNC: 3.9 MEQ/L (ref 3.6–5.1)
POTASSIUM SERPL-SCNC: 4.3 MEQ/L (ref 3.6–5.1)
PROTHROMBIN TIME: 14.3 SEC (ref 12.2–14.5)
RBC # BLD AUTO: 2.6 M/UL (ref 3.93–5.22)
RBC # BLD AUTO: 3.33 M/UL (ref 3.93–5.22)
SAO2 % BLDA: 97 % (ref 93–98)
SAO2 % BLDA: 97 % (ref 93–98)
SAO2 % BLDA: 98 % (ref 93–98)
SODIUM BLDA-SCNC: 132 MEQ/L (ref 136–145)
SODIUM BLDA-SCNC: 133 MEQ/L (ref 136–145)
SODIUM BLDA-SCNC: 134 MEQ/L (ref 136–145)
SODIUM BLDA-SCNC: 135 MEQ/L (ref 136–145)
SODIUM BLDA-SCNC: 135 MEQ/L (ref 136–145)
SODIUM SERPL-SCNC: 134 MEQ/L (ref 136–144)
SODIUM SERPL-SCNC: 138 MEQ/L (ref 136–144)
WBC # BLD AUTO: 12.89 K/UL (ref 3.8–10.5)
WBC # BLD AUTO: 17.58 K/UL (ref 3.8–10.5)

## 2021-07-16 PROCEDURE — 84100 ASSAY OF PHOSPHORUS: CPT | Performed by: PHYSICIAN ASSISTANT

## 2021-07-16 PROCEDURE — 63700000 HC SELF-ADMINISTRABLE DRUG: Performed by: PHYSICIAN ASSISTANT

## 2021-07-16 PROCEDURE — 86891 AUTOLOGOUS BLOOD OP SALVAGE: CPT | Performed by: THORACIC SURGERY (CARDIOTHORACIC VASCULAR SURGERY)

## 2021-07-16 PROCEDURE — 94660 CPAP INITIATION&MGMT: CPT

## 2021-07-16 PROCEDURE — 25000000 HC PHARMACY GENERAL: Performed by: THORACIC SURGERY (CARDIOTHORACIC VASCULAR SURGERY)

## 2021-07-16 PROCEDURE — 83735 ASSAY OF MAGNESIUM: CPT | Performed by: PHYSICIAN ASSISTANT

## 2021-07-16 PROCEDURE — 63700000 HC SELF-ADMINISTRABLE DRUG: Performed by: ANESTHESIOLOGY

## 2021-07-16 PROCEDURE — 84132 ASSAY OF SERUM POTASSIUM: CPT | Performed by: PHYSICIAN ASSISTANT

## 2021-07-16 PROCEDURE — 20000000 HC ROOM AND CARE ICU

## 2021-07-16 PROCEDURE — P9045 ALBUMIN (HUMAN), 5%, 250 ML: HCPCS | Mod: JW | Performed by: ANESTHESIOLOGY

## 2021-07-16 PROCEDURE — 36415 COLL VENOUS BLD VENIPUNCTURE: CPT | Performed by: PHYSICIAN ASSISTANT

## 2021-07-16 PROCEDURE — 63600000 HC DRUGS/DETAIL CODE: Performed by: THORACIC SURGERY (CARDIOTHORACIC VASCULAR SURGERY)

## 2021-07-16 PROCEDURE — 71045 X-RAY EXAM CHEST 1 VIEW: CPT

## 2021-07-16 PROCEDURE — 25800000 HC PHARMACY IV SOLUTIONS: Performed by: ANESTHESIOLOGY

## 2021-07-16 PROCEDURE — B548ZZA ULTRASONOGRAPHY OF SUPERIOR VENA CAVA, GUIDANCE: ICD-10-PCS | Performed by: ANESTHESIOLOGY

## 2021-07-16 PROCEDURE — 85730 THROMBOPLASTIN TIME PARTIAL: CPT | Performed by: PHYSICIAN ASSISTANT

## 2021-07-16 PROCEDURE — 33533 CABG ARTERIAL SINGLE: CPT | Performed by: THORACIC SURGERY (CARDIOTHORACIC VASCULAR SURGERY)

## 2021-07-16 PROCEDURE — C1729 CATH, DRAINAGE: HCPCS | Performed by: THORACIC SURGERY (CARDIOTHORACIC VASCULAR SURGERY)

## 2021-07-16 PROCEDURE — 63600000 HC DRUGS/DETAIL CODE: Performed by: PHYSICIAN ASSISTANT

## 2021-07-16 PROCEDURE — 02HV33Z INSERTION OF INFUSION DEVICE INTO SUPERIOR VENA CAVA, PERCUTANEOUS APPROACH: ICD-10-PCS | Performed by: ANESTHESIOLOGY

## 2021-07-16 PROCEDURE — 33533 CABG ARTERIAL SINGLE: CPT | Mod: 82 | Performed by: THORACIC SURGERY (CARDIOTHORACIC VASCULAR SURGERY)

## 2021-07-16 PROCEDURE — 63700000 HC SELF-ADMINISTRABLE DRUG: Performed by: NURSE PRACTITIONER

## 2021-07-16 PROCEDURE — 63600000 HC DRUGS/DETAIL CODE: Mod: JW | Performed by: ANESTHESIOLOGY

## 2021-07-16 PROCEDURE — 80048 BASIC METABOLIC PNL TOTAL CA: CPT | Performed by: NURSE PRACTITIONER

## 2021-07-16 PROCEDURE — 85027 COMPLETE CBC AUTOMATED: CPT | Performed by: PHYSICIAN ASSISTANT

## 2021-07-16 PROCEDURE — 83735 ASSAY OF MAGNESIUM: CPT | Performed by: NURSE PRACTITIONER

## 2021-07-16 PROCEDURE — 25800000 HC PHARMACY IV SOLUTIONS: Performed by: PHYSICIAN ASSISTANT

## 2021-07-16 PROCEDURE — 25000000 HC PHARMACY GENERAL: Performed by: ANESTHESIOLOGY

## 2021-07-16 PROCEDURE — 36000006 HC OR LEVEL 6 INITIAL 30MIN: Performed by: THORACIC SURGERY (CARDIOTHORACIC VASCULAR SURGERY)

## 2021-07-16 PROCEDURE — 85027 COMPLETE CBC AUTOMATED: CPT | Performed by: NURSE PRACTITIONER

## 2021-07-16 PROCEDURE — S2900 ROBOTIC SURGICAL SYSTEM: HCPCS | Performed by: THORACIC SURGERY (CARDIOTHORACIC VASCULAR SURGERY)

## 2021-07-16 PROCEDURE — 37000001 HC ANESTHESIA GENERAL: Performed by: THORACIC SURGERY (CARDIOTHORACIC VASCULAR SURGERY)

## 2021-07-16 PROCEDURE — 25000000 HC PHARMACY GENERAL: Performed by: PHYSICIAN ASSISTANT

## 2021-07-16 PROCEDURE — 93005 ELECTROCARDIOGRAM TRACING: CPT | Performed by: PHYSICIAN ASSISTANT

## 2021-07-16 PROCEDURE — 3E033XZ INTRODUCTION OF VASOPRESSOR INTO PERIPHERAL VEIN, PERCUTANEOUS APPROACH: ICD-10-PCS | Performed by: THORACIC SURGERY (CARDIOTHORACIC VASCULAR SURGERY)

## 2021-07-16 PROCEDURE — 85610 PROTHROMBIN TIME: CPT | Performed by: PHYSICIAN ASSISTANT

## 2021-07-16 PROCEDURE — 36000016 HC OR LEVEL 6 EA ADDL MIN: Performed by: THORACIC SURGERY (CARDIOTHORACIC VASCULAR SURGERY)

## 2021-07-16 PROCEDURE — 80048 BASIC METABOLIC PNL TOTAL CA: CPT | Performed by: PHYSICIAN ASSISTANT

## 2021-07-16 PROCEDURE — 8E0W0CZ ROBOTIC ASSISTED PROCEDURE OF TRUNK REGION, OPEN APPROACH: ICD-10-PCS | Performed by: THORACIC SURGERY (CARDIOTHORACIC VASCULAR SURGERY)

## 2021-07-16 PROCEDURE — 82803 BLOOD GASES ANY COMBINATION: CPT | Performed by: THORACIC SURGERY (CARDIOTHORACIC VASCULAR SURGERY)

## 2021-07-16 PROCEDURE — 85347 COAGULATION TIME ACTIVATED: CPT | Performed by: THORACIC SURGERY (CARDIOTHORACIC VASCULAR SURGERY)

## 2021-07-16 PROCEDURE — P9045 ALBUMIN (HUMAN), 5%, 250 ML: HCPCS | Performed by: PHYSICIAN ASSISTANT

## 2021-07-16 PROCEDURE — 99226 PR SBSQ OBSERVATION CARE/DAY 35 MINUTES: CPT | Performed by: INTERNAL MEDICINE

## 2021-07-16 PROCEDURE — 27200000 HC STERILE SUPPLY: Performed by: THORACIC SURGERY (CARDIOTHORACIC VASCULAR SURGERY)

## 2021-07-16 PROCEDURE — 02100Z9 BYPASS CORONARY ARTERY, ONE ARTERY FROM LEFT INTERNAL MAMMARY, OPEN APPROACH: ICD-10-PCS | Performed by: THORACIC SURGERY (CARDIOTHORACIC VASCULAR SURGERY)

## 2021-07-16 RX ORDER — SODIUM CHLORIDE, SODIUM GLUCONATE, SODIUM ACETATE, POTASSIUM CHLORIDE AND MAGNESIUM CHLORIDE 30; 37; 368; 526; 502 MG/100ML; MG/100ML; MG/100ML; MG/100ML; MG/100ML
INJECTION, SOLUTION INTRAVENOUS CONTINUOUS PRN
Status: DISCONTINUED | OUTPATIENT
Start: 2021-07-16 | End: 2021-07-16 | Stop reason: SURG

## 2021-07-16 RX ORDER — ONDANSETRON HYDROCHLORIDE 2 MG/ML
4 INJECTION, SOLUTION INTRAVENOUS EVERY 8 HOURS PRN
Status: DISCONTINUED | OUTPATIENT
Start: 2021-07-16 | End: 2021-07-20 | Stop reason: HOSPADM

## 2021-07-16 RX ORDER — LIDOCAINE HYDROCHLORIDE 10 MG/ML
INJECTION, SOLUTION EPIDURAL; INFILTRATION; INTRACAUDAL; PERINEURAL
Status: DISPENSED
Start: 2021-07-16 | End: 2021-07-17

## 2021-07-16 RX ORDER — LIDOCAINE HYDROCHLORIDE AND EPINEPHRINE 5; 5 MG/ML; UG/ML
INJECTION, SOLUTION INFILTRATION; PERINEURAL AS NEEDED
Status: DISCONTINUED | OUTPATIENT
Start: 2021-07-16 | End: 2021-07-16 | Stop reason: HOSPADM

## 2021-07-16 RX ORDER — BISACODYL 10 MG/1
10 SUPPOSITORY RECTAL AS NEEDED
Status: DISCONTINUED | OUTPATIENT
Start: 2021-07-19 | End: 2021-07-20

## 2021-07-16 RX ORDER — CLOPIDOGREL BISULFATE 75 MG/1
75 TABLET ORAL DAILY
Status: DISCONTINUED | OUTPATIENT
Start: 2021-07-16 | End: 2021-07-20 | Stop reason: HOSPADM

## 2021-07-16 RX ORDER — GABAPENTIN 300 MG/1
300 CAPSULE ORAL 2 TIMES DAILY
Status: DISCONTINUED | OUTPATIENT
Start: 2021-07-16 | End: 2021-07-20 | Stop reason: HOSPADM

## 2021-07-16 RX ORDER — NOREPINEPHRINE BITARTRATE 0.02 MG/ML
INJECTION, SOLUTION INTRAVENOUS CONTINUOUS PRN
Status: DISCONTINUED | OUTPATIENT
Start: 2021-07-16 | End: 2021-07-16 | Stop reason: SURG

## 2021-07-16 RX ORDER — FUROSEMIDE 40 MG/1
40 TABLET ORAL
Status: DISPENSED | OUTPATIENT
Start: 2021-07-16 | End: 2021-07-19

## 2021-07-16 RX ORDER — DEXMEDETOMIDINE HYDROCHLORIDE 4 UG/ML
.2-1.5 INJECTION, SOLUTION INTRAVENOUS
Status: DISCONTINUED | OUTPATIENT
Start: 2021-07-16 | End: 2021-07-19

## 2021-07-16 RX ORDER — ROCURONIUM BROMIDE 10 MG/ML
INJECTION, SOLUTION INTRAVENOUS AS NEEDED
Status: DISCONTINUED | OUTPATIENT
Start: 2021-07-16 | End: 2021-07-16 | Stop reason: SURG

## 2021-07-16 RX ORDER — CALCIUM CHLORIDE INJECTION 100 MG/ML
0.5 INJECTION, SOLUTION INTRAVENOUS ONCE AS NEEDED
Status: DISCONTINUED | OUTPATIENT
Start: 2021-07-16 | End: 2021-07-20 | Stop reason: HOSPADM

## 2021-07-16 RX ORDER — ALUMINUM HYDROXIDE, MAGNESIUM HYDROXIDE, AND SIMETHICONE 1200; 120; 1200 MG/30ML; MG/30ML; MG/30ML
30 SUSPENSION ORAL EVERY 4 HOURS PRN
Status: DISCONTINUED | OUTPATIENT
Start: 2021-07-16 | End: 2021-07-20 | Stop reason: HOSPADM

## 2021-07-16 RX ORDER — FENTANYL CITRATE 50 UG/ML
INJECTION, SOLUTION INTRAMUSCULAR; INTRAVENOUS AS NEEDED
Status: DISCONTINUED | OUTPATIENT
Start: 2021-07-16 | End: 2021-07-16 | Stop reason: SURG

## 2021-07-16 RX ORDER — HYDROMORPHONE HYDROCHLORIDE 1 MG/ML
0.5 INJECTION, SOLUTION INTRAMUSCULAR; INTRAVENOUS; SUBCUTANEOUS
Status: DISCONTINUED | OUTPATIENT
Start: 2021-07-16 | End: 2021-07-17

## 2021-07-16 RX ORDER — CHLORHEXIDINE GLUCONATE ORAL RINSE 1.2 MG/ML
15 SOLUTION DENTAL 2 TIMES DAILY
Status: DISCONTINUED | OUTPATIENT
Start: 2021-07-16 | End: 2021-07-16 | Stop reason: SDUPTHER

## 2021-07-16 RX ORDER — COLCHICINE 0.6 MG/1
0.6 TABLET ORAL DAILY
Status: DISCONTINUED | OUTPATIENT
Start: 2021-07-16 | End: 2021-07-20 | Stop reason: HOSPADM

## 2021-07-16 RX ORDER — MEPERIDINE HYDROCHLORIDE 25 MG/ML
12.5 INJECTION INTRAMUSCULAR; INTRAVENOUS; SUBCUTANEOUS AS NEEDED
Status: DISCONTINUED | OUTPATIENT
Start: 2021-07-16 | End: 2021-07-17

## 2021-07-16 RX ORDER — KETOROLAC TROMETHAMINE 15 MG/ML
INJECTION, SOLUTION INTRAMUSCULAR; INTRAVENOUS AS NEEDED
Status: DISCONTINUED | OUTPATIENT
Start: 2021-07-16 | End: 2021-07-16 | Stop reason: SURG

## 2021-07-16 RX ORDER — ATROPINE SULFATE 0.1 MG/ML
0.5 INJECTION INTRAVENOUS EVERY 5 MIN PRN
Status: DISCONTINUED | OUTPATIENT
Start: 2021-07-16 | End: 2021-07-20 | Stop reason: HOSPADM

## 2021-07-16 RX ORDER — CHLORHEXIDINE GLUCONATE ORAL RINSE 1.2 MG/ML
15 SOLUTION DENTAL
Status: DISCONTINUED | OUTPATIENT
Start: 2021-07-16 | End: 2021-07-19

## 2021-07-16 RX ORDER — FAMOTIDINE 10 MG/ML
20 INJECTION INTRAVENOUS 2 TIMES DAILY
Status: DISCONTINUED | OUTPATIENT
Start: 2021-07-16 | End: 2021-07-20

## 2021-07-16 RX ORDER — SENNOSIDES 8.6 MG/1
2 TABLET ORAL 2 TIMES DAILY
Status: DISCONTINUED | OUTPATIENT
Start: 2021-07-16 | End: 2021-07-20 | Stop reason: HOSPADM

## 2021-07-16 RX ORDER — METOPROLOL SUCCINATE 50 MG/1
50 TABLET, EXTENDED RELEASE ORAL NIGHTLY
Status: DISCONTINUED | OUTPATIENT
Start: 2021-07-16 | End: 2021-07-19

## 2021-07-16 RX ORDER — FAMOTIDINE 20 MG/1
20 TABLET, FILM COATED ORAL 2 TIMES DAILY
Status: DISCONTINUED | OUTPATIENT
Start: 2021-07-16 | End: 2021-07-20

## 2021-07-16 RX ORDER — HEPARIN SODIUM 1000 [USP'U]/ML
INJECTION, SOLUTION INTRAVENOUS; SUBCUTANEOUS AS NEEDED
Status: DISCONTINUED | OUTPATIENT
Start: 2021-07-16 | End: 2021-07-16 | Stop reason: SURG

## 2021-07-16 RX ORDER — HYDROMORPHONE HYDROCHLORIDE 1 MG/ML
INJECTION, SOLUTION INTRAMUSCULAR; INTRAVENOUS; SUBCUTANEOUS AS NEEDED
Status: DISCONTINUED | OUTPATIENT
Start: 2021-07-16 | End: 2021-07-16 | Stop reason: SURG

## 2021-07-16 RX ORDER — SODIUM CHLORIDE 9 MG/ML
INJECTION, SOLUTION INTRAVENOUS CONTINUOUS PRN
Status: DISCONTINUED | OUTPATIENT
Start: 2021-07-16 | End: 2021-07-16 | Stop reason: SURG

## 2021-07-16 RX ORDER — ALBUMIN HUMAN 50 G/1000ML
SOLUTION INTRAVENOUS AS NEEDED
Status: DISCONTINUED | OUTPATIENT
Start: 2021-07-16 | End: 2021-07-16 | Stop reason: SURG

## 2021-07-16 RX ORDER — MIDAZOLAM HYDROCHLORIDE 2 MG/2ML
INJECTION, SOLUTION INTRAMUSCULAR; INTRAVENOUS AS NEEDED
Status: DISCONTINUED | OUTPATIENT
Start: 2021-07-16 | End: 2021-07-16 | Stop reason: SURG

## 2021-07-16 RX ORDER — DEXTROSE 50 % IN WATER (D50W) INTRAVENOUS SYRINGE
10-30 AS NEEDED
Status: DISCONTINUED | OUTPATIENT
Start: 2021-07-16 | End: 2021-07-19

## 2021-07-16 RX ORDER — KETOROLAC TROMETHAMINE 30 MG/ML
30 INJECTION, SOLUTION INTRAMUSCULAR; INTRAVENOUS EVERY 6 HOURS PRN
Status: DISCONTINUED | OUTPATIENT
Start: 2021-07-16 | End: 2021-07-20

## 2021-07-16 RX ORDER — SODIUM CHLORIDE 9 MG/ML
INJECTION, SOLUTION INTRAVENOUS CONTINUOUS
Status: DISCONTINUED | OUTPATIENT
Start: 2021-07-16 | End: 2021-07-19

## 2021-07-16 RX ORDER — NOREPINEPHRINE BITARTRATE 0.02 MG/ML
.5-8 INJECTION, SOLUTION INTRAVENOUS
Status: DISCONTINUED | OUTPATIENT
Start: 2021-07-16 | End: 2021-07-19

## 2021-07-16 RX ORDER — DIPHENHYDRAMINE HCL 25 MG
25 CAPSULE ORAL EVERY 6 HOURS PRN
Status: DISCONTINUED | OUTPATIENT
Start: 2021-07-16 | End: 2021-07-20 | Stop reason: HOSPADM

## 2021-07-16 RX ORDER — DEXAMETHASONE SODIUM PHOSPHATE 4 MG/ML
INJECTION, SOLUTION INTRA-ARTICULAR; INTRALESIONAL; INTRAMUSCULAR; INTRAVENOUS; SOFT TISSUE AS NEEDED
Status: DISCONTINUED | OUTPATIENT
Start: 2021-07-16 | End: 2021-07-16 | Stop reason: SURG

## 2021-07-16 RX ORDER — PROPOFOL 10 MG/ML
INJECTION, EMULSION INTRAVENOUS AS NEEDED
Status: DISCONTINUED | OUTPATIENT
Start: 2021-07-16 | End: 2021-07-16 | Stop reason: SURG

## 2021-07-16 RX ORDER — METOPROLOL SUCCINATE 25 MG/1
25 TABLET, EXTENDED RELEASE ORAL NIGHTLY
Status: DISCONTINUED | OUTPATIENT
Start: 2021-07-16 | End: 2021-07-19

## 2021-07-16 RX ORDER — OXYCODONE HYDROCHLORIDE 5 MG/1
5 TABLET ORAL EVERY 4 HOURS PRN
Status: DISCONTINUED | OUTPATIENT
Start: 2021-07-16 | End: 2021-07-20 | Stop reason: HOSPADM

## 2021-07-16 RX ORDER — PROTAMINE SULFATE 10 MG/ML
INJECTION, SOLUTION INTRAVENOUS AS NEEDED
Status: DISCONTINUED | OUTPATIENT
Start: 2021-07-16 | End: 2021-07-16 | Stop reason: SURG

## 2021-07-16 RX ORDER — ASPIRIN 81 MG/1
81 TABLET ORAL DAILY
Status: DISCONTINUED | OUTPATIENT
Start: 2021-07-16 | End: 2021-07-20 | Stop reason: HOSPADM

## 2021-07-16 RX ORDER — ALBUMIN HUMAN 50 G/1000ML
500 SOLUTION INTRAVENOUS AS NEEDED
Status: DISCONTINUED | OUTPATIENT
Start: 2021-07-16 | End: 2021-07-20 | Stop reason: HOSPADM

## 2021-07-16 RX ORDER — ONDANSETRON HYDROCHLORIDE 2 MG/ML
INJECTION, SOLUTION INTRAVENOUS AS NEEDED
Status: DISCONTINUED | OUTPATIENT
Start: 2021-07-16 | End: 2021-07-16 | Stop reason: SURG

## 2021-07-16 RX ORDER — DIPHENHYDRAMINE HCL 50 MG/ML
25 VIAL (ML) INJECTION EVERY 6 HOURS PRN
Status: DISCONTINUED | OUTPATIENT
Start: 2021-07-16 | End: 2021-07-20 | Stop reason: HOSPADM

## 2021-07-16 RX ORDER — DOCUSATE SODIUM 100 MG/1
200 CAPSULE, LIQUID FILLED ORAL 2 TIMES DAILY
Status: DISCONTINUED | OUTPATIENT
Start: 2021-07-16 | End: 2021-07-20 | Stop reason: HOSPADM

## 2021-07-16 RX ORDER — ALBUTEROL SULFATE 90 UG/1
INHALANT RESPIRATORY (INHALATION) AS NEEDED
Status: DISCONTINUED | OUTPATIENT
Start: 2021-07-16 | End: 2021-07-16 | Stop reason: SURG

## 2021-07-16 RX ORDER — CEFAZOLIN SODIUM 2 G/50ML
2 SOLUTION INTRAVENOUS
Status: COMPLETED | OUTPATIENT
Start: 2021-07-16 | End: 2021-07-17

## 2021-07-16 RX ORDER — BUPIVACAINE HYDROCHLORIDE 2.5 MG/ML
INJECTION, SOLUTION EPIDURAL; INFILTRATION; INTRACAUDAL AS NEEDED
Status: DISCONTINUED | OUTPATIENT
Start: 2021-07-16 | End: 2021-07-16 | Stop reason: HOSPADM

## 2021-07-16 RX ORDER — ONDANSETRON 4 MG/1
4 TABLET, ORALLY DISINTEGRATING ORAL EVERY 8 HOURS PRN
Status: DISCONTINUED | OUTPATIENT
Start: 2021-07-16 | End: 2021-07-20 | Stop reason: HOSPADM

## 2021-07-16 RX ORDER — POTASSIUM CHLORIDE 750 MG/1
20 TABLET, FILM COATED, EXTENDED RELEASE ORAL 2 TIMES DAILY
Status: DISCONTINUED | OUTPATIENT
Start: 2021-07-16 | End: 2021-07-20

## 2021-07-16 RX ORDER — MUPIROCIN 20 MG/G
1 OINTMENT TOPICAL 2 TIMES DAILY
Status: DISCONTINUED | OUTPATIENT
Start: 2021-07-16 | End: 2021-07-20

## 2021-07-16 RX ORDER — LIDOCAINE HYDROCHLORIDE 10 MG/ML
INJECTION, SOLUTION INFILTRATION; PERINEURAL AS NEEDED
Status: DISCONTINUED | OUTPATIENT
Start: 2021-07-16 | End: 2021-07-16 | Stop reason: SURG

## 2021-07-16 RX ORDER — ACETAMINOPHEN 325 MG/1
975 TABLET ORAL EVERY 6 HOURS
Status: DISCONTINUED | OUTPATIENT
Start: 2021-07-16 | End: 2021-07-20 | Stop reason: HOSPADM

## 2021-07-16 RX ORDER — POTASSIUM CHLORIDE 14.9 MG/ML
20 INJECTION INTRAVENOUS EVERY 8 HOURS PRN
Status: DISCONTINUED | OUTPATIENT
Start: 2021-07-16 | End: 2021-07-20 | Stop reason: HOSPADM

## 2021-07-16 RX ORDER — FUROSEMIDE 40 MG/1
40 TABLET ORAL DAILY
Status: DISCONTINUED | OUTPATIENT
Start: 2021-07-20 | End: 2021-07-20 | Stop reason: HOSPADM

## 2021-07-16 RX ORDER — LANOLIN ALCOHOL/MO/W.PET/CERES
400 CREAM (GRAM) TOPICAL 2 TIMES DAILY
Status: DISCONTINUED | OUTPATIENT
Start: 2021-07-16 | End: 2021-07-20 | Stop reason: HOSPADM

## 2021-07-16 RX ORDER — SODIUM BICARBONATE 1 MEQ/ML
50-100 SYRINGE (ML) INTRAVENOUS AS NEEDED
Status: DISCONTINUED | OUTPATIENT
Start: 2021-07-16 | End: 2021-07-20 | Stop reason: HOSPADM

## 2021-07-16 RX ADMIN — CHLORHEXIDINE GLUCONATE 0.12% ORAL RINSE 15 ML: 1.2 LIQUID ORAL at 06:16

## 2021-07-16 RX ADMIN — PROPOFOL INJECTABLE EMULSION 20 MG: 10 INJECTION, EMULSION INTRAVENOUS at 17:06

## 2021-07-16 RX ADMIN — KETOROLAC TROMETHAMINE 30 MG: 30 INJECTION, SOLUTION INTRAMUSCULAR; INTRAVENOUS at 23:48

## 2021-07-16 RX ADMIN — SODIUM CHLORIDE: 9 INJECTION, SOLUTION INTRAVENOUS at 12:15

## 2021-07-16 RX ADMIN — HYDROMORPHONE HYDROCHLORIDE 0.5 MG: 1 INJECTION, SOLUTION INTRAMUSCULAR; INTRAVENOUS; SUBCUTANEOUS at 18:58

## 2021-07-16 RX ADMIN — SUGAMMADEX 200 MG: 100 INJECTION, SOLUTION INTRAVENOUS at 17:20

## 2021-07-16 RX ADMIN — PROPOFOL INJECTABLE EMULSION 20 MG: 10 INJECTION, EMULSION INTRAVENOUS at 12:34

## 2021-07-16 RX ADMIN — Medication 81 MG: at 08:56

## 2021-07-16 RX ADMIN — ROCURONIUM BROMIDE 20 MG: 10 INJECTION, SOLUTION INTRAVENOUS at 14:27

## 2021-07-16 RX ADMIN — LIDOCAINE HYDROCHLORIDE 5 ML: 10 INJECTION, SOLUTION INFILTRATION; PERINEURAL at 12:32

## 2021-07-16 RX ADMIN — HYDROMORPHONE HYDROCHLORIDE 0.5 MG: 1 INJECTION, SOLUTION INTRAMUSCULAR; INTRAVENOUS; SUBCUTANEOUS at 22:18

## 2021-07-16 RX ADMIN — ALBUMIN (HUMAN) 250 ML: 12.5 SOLUTION INTRAVENOUS at 13:12

## 2021-07-16 RX ADMIN — MAGNESIUM SULFATE HEPTAHYDRATE 2 G: 40 INJECTION, SOLUTION INTRAVENOUS at 18:56

## 2021-07-16 RX ADMIN — BUPROPION HYDROCHLORIDE 300 MG: 300 TABLET, EXTENDED RELEASE ORAL at 08:56

## 2021-07-16 RX ADMIN — HYDROMORPHONE HYDROCHLORIDE 0.5 MG: 1 INJECTION, SOLUTION INTRAMUSCULAR; INTRAVENOUS; SUBCUTANEOUS at 20:07

## 2021-07-16 RX ADMIN — SODIUM BICARBONATE 50 MEQ: 84 INJECTION, SOLUTION INTRAVENOUS at 21:34

## 2021-07-16 RX ADMIN — INSULIN HUMAN 1 UNITS: 100 INJECTION, SOLUTION PARENTERAL at 16:30

## 2021-07-16 RX ADMIN — CEFAZOLIN 2 G: 330 INJECTION, POWDER, FOR SOLUTION INTRAMUSCULAR; INTRAVENOUS at 12:40

## 2021-07-16 RX ADMIN — SODIUM CHLORIDE, SODIUM GLUCONATE, SODIUM ACETATE, POTASSIUM CHLORIDE AND MAGNESIUM CHLORIDE: 526; 502; 368; 37; 30 INJECTION, SOLUTION INTRAVENOUS at 13:25

## 2021-07-16 RX ADMIN — DEXAMETHASONE SODIUM PHOSPHATE 4 MG: 4 INJECTION, SOLUTION INTRAMUSCULAR; INTRAVENOUS at 13:59

## 2021-07-16 RX ADMIN — LORAZEPAM 0.5 MG: 0.5 TABLET ORAL at 06:16

## 2021-07-16 RX ADMIN — SUGAMMADEX 200 MG: 100 INJECTION, SOLUTION INTRAVENOUS at 17:06

## 2021-07-16 RX ADMIN — ROCURONIUM BROMIDE 30 MG: 10 INJECTION, SOLUTION INTRAVENOUS at 13:18

## 2021-07-16 RX ADMIN — METOPROLOL TARTRATE 25 MG: 25 TABLET, FILM COATED ORAL at 08:56

## 2021-07-16 RX ADMIN — HEPARIN SODIUM 12000 UNITS: 1000 INJECTION, SOLUTION INTRAVENOUS; SUBCUTANEOUS at 16:22

## 2021-07-16 RX ADMIN — DULOXETINE 90 MG: 60 CAPSULE, DELAYED RELEASE ORAL at 08:56

## 2021-07-16 RX ADMIN — DEXTROSE MONOHYDRATE 2 G: 5 INJECTION, SOLUTION INTRAVENOUS at 22:58

## 2021-07-16 RX ADMIN — POTASSIUM CHLORIDE 20 MEQ: 14.9 INJECTION, SOLUTION INTRAVENOUS at 19:01

## 2021-07-16 RX ADMIN — PROTAMINE SULFATE 120 MG: 10 INJECTION, SOLUTION INTRAVENOUS at 16:52

## 2021-07-16 RX ADMIN — PROPOFOL INJECTABLE EMULSION 80 MG: 10 INJECTION, EMULSION INTRAVENOUS at 12:32

## 2021-07-16 RX ADMIN — ROCURONIUM BROMIDE 70 MG: 10 INJECTION, SOLUTION INTRAVENOUS at 12:34

## 2021-07-16 RX ADMIN — HEPARIN SODIUM 4000 UNITS: 1000 INJECTION, SOLUTION INTRAVENOUS; SUBCUTANEOUS at 14:51

## 2021-07-16 RX ADMIN — MUPIROCIN 1 APPLICATION.: 20 OINTMENT TOPICAL at 22:58

## 2021-07-16 RX ADMIN — HYDROMORPHONE HYDROCHLORIDE 0.5 MG: 1 INJECTION, SOLUTION INTRAMUSCULAR; INTRAVENOUS; SUBCUTANEOUS at 18:13

## 2021-07-16 RX ADMIN — HYDROMORPHONE HYDROCHLORIDE 0.5 MG: 1 INJECTION, SOLUTION INTRAMUSCULAR; INTRAVENOUS; SUBCUTANEOUS at 13:12

## 2021-07-16 RX ADMIN — ALBUMIN (HUMAN) 500 ML: 12.5 SOLUTION INTRAVENOUS at 18:14

## 2021-07-16 RX ADMIN — FENTANYL CITRATE 100 MCG: 50 INJECTION, SOLUTION INTRAMUSCULAR; INTRAVENOUS at 12:32

## 2021-07-16 RX ADMIN — MIDAZOLAM HYDROCHLORIDE 2 MG: 1 INJECTION, SOLUTION INTRAMUSCULAR; INTRAVENOUS at 12:18

## 2021-07-16 RX ADMIN — PROTAMINE SULFATE 100 MG: 10 INJECTION, SOLUTION INTRAVENOUS at 16:17

## 2021-07-16 RX ADMIN — KETOROLAC TROMETHAMINE 30 MG: 15 INJECTION, SOLUTION INTRAMUSCULAR; INTRAVENOUS at 17:02

## 2021-07-16 RX ADMIN — NOREPINEPHRINE BITARTRATE 2 MCG/MIN: at 13:32

## 2021-07-16 RX ADMIN — MUPIROCIN 1 APPLICATION.: 20 OINTMENT TOPICAL at 08:56

## 2021-07-16 RX ADMIN — FAMOTIDINE 20 MG: 10 INJECTION INTRAVENOUS at 20:07

## 2021-07-16 RX ADMIN — ALBUMIN (HUMAN) 500 ML: 12.5 SOLUTION INTRAVENOUS at 20:31

## 2021-07-16 RX ADMIN — ALBUTEROL SULFATE 4 PUFF: 90 AEROSOL, METERED RESPIRATORY (INHALATION) at 17:13

## 2021-07-16 RX ADMIN — ROCURONIUM BROMIDE 10 MG: 10 INJECTION, SOLUTION INTRAVENOUS at 16:30

## 2021-07-16 RX ADMIN — CHLORHEXIDINE GLUCONATE 0.12% ORAL RINSE 15 ML: 1.2 LIQUID ORAL at 22:00

## 2021-07-16 RX ADMIN — DEXMEDETOMIDINE HYDROCHLORIDE 0.4 MCG/KG/HR: 100 INJECTION, SOLUTION INTRAVENOUS at 13:12

## 2021-07-16 RX ADMIN — ONDANSETRON HYDROCHLORIDE 4 MG: 2 SOLUTION INTRAMUSCULAR; INTRAVENOUS at 17:02

## 2021-07-16 RX ADMIN — CEFAZOLIN 1 G: 330 INJECTION, POWDER, FOR SOLUTION INTRAMUSCULAR; INTRAVENOUS at 15:27

## 2021-07-16 RX ADMIN — HEPARIN SODIUM 10000 UNITS: 1000 INJECTION, SOLUTION INTRAVENOUS; SUBCUTANEOUS at 15:38

## 2021-07-16 ASSESSMENT — ENCOUNTER SYMPTOMS: SHORTNESS OF BREATH: 1

## 2021-07-16 NOTE — NURSING NOTE
Report called to Gilma in Pre-Op. Pt to be sent with vanco and ancef. Belongings given to visitor.

## 2021-07-16 NOTE — PROGRESS NOTES
Pre-Procedure patient identification:  No change in H&P.   I am the primary operating surgeon/proceduralist and I have identified the patient    Felix Nix,

## 2021-07-16 NOTE — ANESTHESIOLOGIST PRE-PROCEDURE ATTESTATION
Pre-Procedure Patient Identification:  I am the Primary Anesthesiologist and have identified the patient on 07/16/21 at 11:25 AM.   I have confirmed the procedure(s) will be performed by the following surgeon/proceduralist Felix Nix DO.

## 2021-07-16 NOTE — OP NOTE
Cardiac Surgery         Brian Dean  [id number]  1960 7/16/2021    PREOPERATIVE DIAGNOSES:  1.  Coronary artery disease  2.  Old myocardial infarction  3.  Dyslipidemia  4.  Past history of spontaneous coronary artery dissection  POSTOPERATIVE DIAGNOSE  1. Same    OPERATIVE PROCEDURE: Robotic-assisted small anterior thoracotomy, single coronary artery bypass, with left internal mammary artery bypass to the anterior descending artery.    SURGEON: Felix Nix D.O  ASSISTANT: Kale/oh  ANESTHESIA: General, with Dr. Okeefe    OPERATIVE PROCEDURE: The patient was placed in the supine position, general anesthesia, he was prepped and draped. Three ports were placed in the standard fashion. An opening was made in the posterior pericardium. The anterior pericardial fat was retracted laterally. The pericardium was opened. The anterior descending and its diagonal branch were easily identified and a portion of the pericardium was taken medially. The target area was marked with clips and then the left mammary was dissected in the standard skeletonized fashion, and when the dissection was complete, the end of the mammary was clipped, transected, and tacked to the epicardium at the target site. A Valeriy drain was placed through the right port with that completed. The robotic instruments were removed. The endoscopic port was enlarged slightly. A small soft tissue retractor was placed. It was exactly over the target area.  Surprisingly it was the distal portion of the LAD that was intramyocardial more than the mid portion.  The mammary was brought into the field and using a [employer] stabilizer, a 1.75 shunt was placed and an anastomosis fashioned uneventfully using the mammary with a running suture of 7-0 Prolene. The flows were checked, and the flows were about 15 mL..  I was hopeful for more but all the other parameters were fine PROTAMINE was given.  I decided subsequently that I would redo the  anastomosis as everything was just sitting there and the anastomosis was redone and everything was the same and there was no abnormality seen within the old anastomosis or the flow of the internal mammary.  Should be noted that 1 small part of the arteriotomy delaminated but it was in the midportion of the opening.  Hemostasis was achieved. A #19 Valeriy was placed through the left port site and then the chest was closed with 2 paracostal sutures of #2 Ethibond, and then the remainder of the chest was closed in layers with a subcuticular skin closure. Local anesthesia was applied at each port site, as well as an intercostal block. The surgical PA was required and present during this entire surgical procedure; facilitating with the opening, assisting with all integral elements of robotic coronary artery bypass grafting, including instrument exchange, with closure and transport to the CT ICU. The patient was taken from the OR uneventfully.    BLOOD LOSS: 100 cc    SPECIMENS: None    IMPLANTS: None             Felix Nix, DO    This document was generated utilizing voice recognition technology. Please excuse any typographical errors which may be present.

## 2021-07-16 NOTE — PROGRESS NOTES
Cardiology Daily Progress Note    Subjective/Objective:  No angina.  Anxious about upcoming surgery scheduled for this afternoon    Review of systems: No headaches no visual disturbances 11 other systems reviewed and were noncontributory    Physical exam: Middle-aged female in no acute distress  Vital signs stable  Left radial cath site clean and dry  Skin: Warm dry  Blood pressure 126/78  HEENT: Unremarkable  Neck: No bruits no JVD  Lungs clear  Cardiac regular rate tones are of good quality  Abdomen soft  Extremities unremarkable  Neuro no focal deficits    Laboratory data: All pertinent lab studies personally reviewed.  Results were discussed with patient    Assessment/Plan   1.  Likely intramural hematoma or spontaneous dissection of the ostium of the LAD.  The patient is scheduled for robotic CABG today.  The risks and benefits of this procedure were again discussed in detail with the patient.  All questions were answered.  The patient wishes to proceed.  She is aware of the potential limitations of revascularization surgically.    2.  Dyslipidemia would continue high intensity statin therapy although I am not sure of the usefulness of this based on her pathology.         Expected Discharge Date:  7/17/2021

## 2021-07-16 NOTE — PLAN OF CARE
Plan of Care Review  Plan of Care Reviewed With: patient  Progress: improving  Outcome Summary: Pt transported to OR.

## 2021-07-16 NOTE — NURSING NOTE
Assessment complete. Pt denies CP or SOB. Expresses a great deal of anxiety regarding procedure. Ativan given around the clock. Pt scheduled for 1pm. Pt has been doing Bactroban nasal ointment and CHG wipes/mouth wash. Pt has family coming to bedside to keep company prior to procedure. Continuing to monitor.

## 2021-07-16 NOTE — ANESTHESIA PROCEDURE NOTES
Central Venous Line:              A cardiac line was placed in the OR for the following indication(s): central venous access and CVP monitoring.      Sterility preparation included the following: provider hand hygiene performed prior to insertion and all 5 sterile barriers used (gloves, gown, cap, mask, large sterile drape) during insertion.      The patient was placed in Trendelenburg position. Right internal jugular vein was prepped.    The site was prepped with Chlorhexidine.  Catheter size: 8Fr. 16 (length), introducer double lumen was placed.  This catheter was not an oximetric catheter.    During the procedure, the following specific steps were taken: target vein identified, needle advanced into vein and blood aspirated and guidewire advanced into vein.  Seldinger technique used      Procedure performed using ultrasound guidance and surface landmarks.  Sterile gel and probe cover used in ultrasound-guided central venous catheter insertion.  Image captured and stored.      pulmonary artery catheterization not placed            Intravenous verification was obtained by ultrasound and venous blood return.      Post insertion care included: all ports aspirated, all ports flushed easily, guidewire removed intact, Biopatch applied, line sutured in place and dressing applied.    During the procedure the patient experienced: patient tolerated procedure well with no complications.                    Staffing  Performed: anesthesiologist   Anesthesiologist: Benita Okeefe MD

## 2021-07-16 NOTE — ANESTHESIA PREPROCEDURE EVALUATION
Relevant Problems   CARDIOVASCULAR   (+) Coronary artery disease involving native coronary artery of native heart without angina pectoris   (+) Dyslipidemia   (+) Dyspnea on exertion   (+) NSTEMI (non-ST elevated myocardial infarction) (CMS/HCC)      NEUROLOGY   (+) Anxiety      RESPIRATORY SYSTEM   (+) Asthma   (+) Dyspnea on exertion       Anesthesia ROS/MED HX    Anesthesia History    Previous anesthetics  No history of anesthetic complications  Pulmonary    asthma   Shortness of breath  Neuro/Psych    Anxiety  Cardiovascular   CAD   dyslipidemia   MI   Covid19 Test Reviewed  GI/Hepatic- neg  Musculoskeletal- neg  Renal Disease- neg  Endo/Other- neg  Body Habitus: Normal  ROS/MED HX Comments:    Cardiology: Spontaneous coronary dissection       Past Surgical History:   Procedure Laterality Date   • REDUCTION MAMMAPLASTY         Physical Exam    Airway   Mallampati: II   TM distance: >3 FB   Neck ROM: full  Cardiovascular - normal   Rhythm: regular   Rate: normalPulmonary - normal   clear to auscultation  Dental - normal        Anesthesia Plan    Plan: general    Technique: general endotracheal     Lines and Monitors: arterial line, BIS, central line and CVP     Airway: video laryngoscope and oral intubation   ASA 4  Blood Products:     Use of Blood Products Discussed: Yes     Consented to blood products  Anesthetic plan and risks discussed with: patient  Induction:    intravenous   Postop Plan:    Trial extubation   Patient Disposition: ICU planned admission   Pain Management: IV analgesics  Comments:    Plan: 1. 70% mid LAD focal stenosis just distal to a large diagonal vessel.  The borders of the lesion appear smooth and the lack of other angiographic disease suggests FMD or intramural hematoma as etiology rather than atherosclerotic disease.  2. Low -normal LVEDP    RHC Findings  1. Low right and left heart filling pressures (RA 1, PA 30/8 (15), and PCW 3 mm Hg).  2. Cardiac output and index of 6.32 L/min and  3.9 L/min/m2 respectively.

## 2021-07-16 NOTE — PLAN OF CARE
Problem: Adult Inpatient Plan of Care  Goal: Patient-Specific Goal (Individualized)  Outcome: Progressing  Flowsheets (Taken 7/16/2021 0203)  Patient-Specific Goals (Include Timeframe): rapid recovery from surgery, and D/C when stable  Individualized Care Needs: robotic cabg scheduled for 7/16/21  Anxieties, Fears or Concerns: none   Plan of Care Review  Plan of Care Reviewed With: patient  Progress: improving  Outcome Summary: D/C to home when stable

## 2021-07-16 NOTE — ANESTHESIA PROCEDURE NOTES
Airway  Urgency: elective    Start Time: 7/16/2021 12:36 PM  Airway not difficult    General Information and Staff    Patient location during procedure: OR  Anesthesiologist: Benita Okeefe MD  Performed: anesthesiologist     Indications and Patient Condition  Indications for airway management: anesthesia  Sedation level: deep  Preoxygenated: yes  Patient position: sniffing  Mask difficulty assessment: 1 - vent by mask    Final Airway Details  Final airway type: endotracheal airway      Successful airway: ETT wEVAC  Cuffed: yes   Successful intubation technique: video laryngoscopy  Facilitating devices/methods: intubating stylet and bronchial blockers  Endotracheal tube insertion site: oral  Blade size: #3  ETT size (mm): 7.5  Cormack-Lehane Classification: grade I - full view of glottis  Placement verified by: chest auscultation and capnometry   Measured from: lips  ETT to lips (cm): 21  Number of attempts at approach: 1  Number of other approaches attempted: 0  Atraumatic airway insertion

## 2021-07-16 NOTE — ANESTHESIA POSTPROCEDURE EVALUATION
Patient: Brian Dean    Procedure Summary     Date: 07/16/21 Room / Location: [facility] OR 3 / [facility] OR    Anesthesia Start: 1215 Anesthesia Stop: 1750    Procedure: Off Pump CABG MID/THORACOTOMY HEARD, ROBOTIC (N/A Chest) Diagnosis:       Coronary artery disease involving native coronary artery of native heart without angina pectoris      Coronary artery dissection      NSTEMI (non-ST elevated myocardial infarction) (CMS/HCC)      (Coronary artery disease involving native coronary artery of native heart without angina pectoris [I25.10])      (Coronary artery dissection [I25.42])      (NSTEMI (non-ST elevated myocardial infarction) (CMS/HCC) [I21.4])    Surgeons: Felix Nix DO Responsible Provider: Benita Okeefe MD    Anesthesia Type: general ASA Status: 4          Anesthesia Type: general  PACU Vitals  7/16/2021 1734 - 7/16/2021 1750      7/16/2021  1741             Arterial Line BP:  130/74    Pulse:  88    SpO2:  100 %            Anesthesia Post Evaluation    Pain management: adequate  Patient location during evaluation: ICU  Patient participation: complete - patient participated  Level of consciousness: awake and alert  Cardiovascular status: acceptable  Airway Patency: adequate  Respiratory status: acceptable and nasal cannula  Hydration status: acceptable  Anesthetic complications: no  Comments: ABG obtained and is satisfactory

## 2021-07-16 NOTE — BRIEF OP NOTE
Off Pump CABG MID/THORACOTOMY HEARD, ROBOTIC Procedure Note    Procedure:    Off Pump CABG MID/THORACOTOMY HEARD, ROBOTIC  CPT(R) Code:  45629 - UT CABG, ARTERIAL, SINGLE      Pre-op Diagnosis     * Coronary artery disease involving native coronary artery of native heart without angina pectoris [I25.10]     * Coronary artery dissection [I25.42]     * NSTEMI (non-ST elevated myocardial infarction) (CMS/HCC) [I21.4]       Post-op Diagnosis     * Coronary artery disease involving native coronary artery of native heart without angina pectoris [I25.10]     * Coronary artery dissection [I25.42]     * NSTEMI (non-ST elevated myocardial infarction) (CMS/HCC) [I21.4]    Surgeon(s) and Role:     * Felix Nix, DO - Primary     * Pablo Atkinson MD - Assisting    Anesthesia: General    Staff:   Circulator: Mary Junior RN  Physician Assistant: Compa Goss PA C  Scrub Person: Kerry Wright RN    Procedure Details   Robotic OPCABG x 1: LIMA to LAD    No blood products transfused intraoperatively. Extubated prior to transport to CTICU on no infusions.    Estimated Blood Loss: No blood loss documented.    Specimens:                Order Name Source Comment Collection Info Order Time   TYPE AND SCREEN Blood, Venous  Collected By: Kamilla Emanuel 7/15/2021  6:03 PM     Are you aware of this patient having previously identified antibodies?   NO          Does this Patient have Sickle Cell Disease/Sickle Cell Anemia?   NO          Release to patient   Immediate        PREPARE RBC  Pre-op for Procedure  7/15/2021  6:03 PM     Transfusion indications   Pre-OP major surgery with bleeding risk          Has consent been obtained?   Yes          Are you aware of this patient having previously identified antibodies?   NO          Does this Patient have Sickle Cell Disease/Sickle Cell Anemia?   NO              Drains:   Chest Tube 1 Left Pleural  (Active)   Connected to Chest Drainage System #1 Chest Drainage  System 07/16/21 1741       Chest Tube 2 Left Pleural  (Active)   Connected to Chest Drainage System #1 Chest Drainage System 07/16/21 1741       Urethral Catheter Temperature probe 16 Fr (Active)   Output (mL) 100 mL 07/16/21 1741   Net Urine Output (mL) 100 mL 07/16/21 1741       Implants: * No implants in log *           Complications:  None; patient tolerated the procedure well.           Disposition: ICU - extubated and stable.           Condition: stable    Felix Nix DO  Phone Number: [phone]

## 2021-07-17 ENCOUNTER — APPOINTMENT (INPATIENT)
Dept: RADIOLOGY | Facility: HOSPITAL | Age: 61
DRG: 233 | End: 2021-07-17
Attending: PHYSICIAN ASSISTANT
Payer: COMMERCIAL

## 2021-07-17 ENCOUNTER — APPOINTMENT (INPATIENT)
Dept: RADIOLOGY | Facility: HOSPITAL | Age: 61
DRG: 233 | End: 2021-07-17
Attending: NURSE PRACTITIONER
Payer: COMMERCIAL

## 2021-07-17 LAB
ANION GAP SERPL CALC-SCNC: 9 MEQ/L (ref 3–15)
ATRIAL RATE: 71
ATRIAL RATE: 89
BASE EXCESS BLDA CALC-SCNC: -0.6 MEQ/L
BASE EXCESS BLDA CALC-SCNC: -0.8 MEQ/L
BASE EXCESS BLDA CALC-SCNC: -0.9 MEQ/L
BASE EXCESS BLDA CALC-SCNC: -1.5 MEQ/L
BASE EXCESS BLDA CALC-SCNC: -3.5 MEQ/L
BASE EXCESS BLDA CALC-SCNC: 0.5 MEQ/L
BUN SERPL-MCNC: 10 MG/DL (ref 8–20)
CA-I BLD-SCNC: 1.12 MMOL/L (ref 1.15–1.27)
CA-I BLD-SCNC: 1.15 MMOL/L (ref 1.15–1.27)
CA-I BLD-SCNC: 1.15 MMOL/L (ref 1.15–1.27)
CA-I BLD-SCNC: 1.18 MMOL/L (ref 1.15–1.27)
CA-I BLD-SCNC: 1.18 MMOL/L (ref 1.15–1.27)
CA-I BLD-SCNC: 1.28 MMOL/L (ref 1.15–1.27)
CA-I BLD-SCNC: 1.36 MMOL/L (ref 1.15–1.27)
CALCIUM SERPL-MCNC: 8.4 MG/DL (ref 8.9–10.3)
CHLORIDE BLDA-SCNC: 104 MEQ/L (ref 98–109)
CHLORIDE BLDA-SCNC: 104 MEQ/L (ref 98–109)
CHLORIDE BLDA-SCNC: 105 MEQ/L (ref 98–109)
CHLORIDE BLDA-SCNC: 106 MEQ/L (ref 98–109)
CHLORIDE SERPL-SCNC: 104 MEQ/L (ref 98–109)
CO2 BLDA-SCNC: 23 MEQ/L (ref 22–32)
CO2 BLDA-SCNC: 24 MEQ/L (ref 22–32)
CO2 BLDA-SCNC: 26 MEQ/L (ref 22–32)
CO2 BLDA-SCNC: 27 MEQ/L (ref 22–32)
CO2 SERPL-SCNC: 25 MEQ/L (ref 22–32)
CREAT SERPL-MCNC: 0.8 MG/DL (ref 0.6–1.1)
ERYTHROCYTE [DISTWIDTH] IN BLOOD BY AUTOMATED COUNT: 13 % (ref 11.7–14.4)
ERYTHROCYTE [DISTWIDTH] IN BLOOD BY AUTOMATED COUNT: 13.4 % (ref 11.7–14.4)
GFR SERPL CREATININE-BSD FRML MDRD: >60 ML/MIN/1.73M*2
GLUCOSE BLDA-MCNC: 133 MG/DL (ref 70–99)
GLUCOSE BLDA-MCNC: 136 MG/DL (ref 70–99)
GLUCOSE BLDA-MCNC: 143 MG/DL (ref 70–99)
GLUCOSE BLDA-MCNC: 153 MG/DL (ref 70–99)
GLUCOSE BLDA-MCNC: 162 MG/DL (ref 70–99)
GLUCOSE BLDA-MCNC: 178 MG/DL (ref 70–99)
GLUCOSE SERPL-MCNC: 137 MG/DL (ref 70–99)
HCO3 BLDA-SCNC: 22 MEQ/L (ref 21–28)
HCO3 BLDA-SCNC: 24 MEQ/L (ref 21–28)
HCO3 BLDA-SCNC: 25 MEQ/L (ref 21–28)
HCO3 BLDA-SCNC: 25 MEQ/L (ref 21–28)
HCT VFR BLDCO AUTO: 22.4 % (ref 35–45)
HCT VFR BLDCO AUTO: 28.4 % (ref 35–45)
HGB BLD-MCNC: 7.4 G/DL (ref 11.8–15.7)
HGB BLD-MCNC: 9.6 G/DL (ref 11.8–15.7)
HGB BLDA-MCNC: 10 G/DL (ref 12–16)
HGB BLDA-MCNC: 7 G/DL (ref 12–16)
HGB BLDA-MCNC: 7.3 G/DL (ref 12–16)
HGB BLDA-MCNC: 7.7 G/DL (ref 12–16)
HGB BLDA-MCNC: 7.8 G/DL (ref 12–16)
HGB BLDA-MCNC: 9.3 G/DL (ref 12–16)
LACTATE BLDA-SCNC: 0.5 MMOL/L (ref 0.4–1.6)
LACTATE BLDA-SCNC: 0.8 MMOL/L (ref 0.4–1.6)
LACTATE BLDA-SCNC: 0.9 MMOL/L (ref 0.4–1.6)
LACTATE BLDA-SCNC: 1 MMOL/L (ref 0.4–1.6)
LACTATE BLDA-SCNC: 2 MMOL/L (ref 0.4–1.6)
LACTATE BLDA-SCNC: 2.8 MMOL/L (ref 0.4–1.6)
MAGNESIUM SERPL-MCNC: 1.9 MG/DL (ref 1.8–2.5)
MAGNESIUM SERPL-MCNC: 2.2 MG/DL (ref 1.8–2.5)
MAGNESIUM SERPL-MCNC: 2.5 MG/DL (ref 1.8–2.5)
MAGNESIUM SERPL-MCNC: 3 MG/DL (ref 1.8–2.5)
MCH RBC QN AUTO: 29.6 PG (ref 28–33.2)
MCH RBC QN AUTO: 30.1 PG (ref 28–33.2)
MCHC RBC AUTO-ENTMCNC: 33 G/DL (ref 32.2–35.5)
MCHC RBC AUTO-ENTMCNC: 33.8 G/DL (ref 32.2–35.5)
MCV RBC AUTO: 87.7 FL (ref 83–98)
MCV RBC AUTO: 91.1 FL (ref 83–98)
P AXIS: 63
P AXIS: 66
PCO2 BLDA: 37 MM HG (ref 35–48)
PCO2 BLDA: 41 MM HG (ref 35–48)
PCO2 BLDA: 46 MM HG (ref 35–48)
PCO2 BLDA: 50 MM HG (ref 35–48)
PDW BLD AUTO: 10.8 FL (ref 9.4–12.3)
PDW BLD AUTO: 11 FL (ref 9.4–12.3)
PH BLDA: 7.32 PH (ref 7.35–7.45)
PH BLDA: 7.34 PH (ref 7.35–7.45)
PH BLDA: 7.35 PH (ref 7.35–7.45)
PH BLDA: 7.38 PH (ref 7.35–7.45)
PH BLDA: 7.4 PH (ref 7.35–7.45)
PH BLDA: 7.4 PH (ref 7.35–7.45)
PHOSPHATE SERPL-MCNC: 4.1 MG/DL (ref 2.4–4.7)
PLATELET # BLD AUTO: 197 K/UL (ref 150–369)
PLATELET # BLD AUTO: 211 K/UL (ref 150–369)
PO2 BLDA: 100 MM HG (ref 83–100)
PO2 BLDA: 101 MM HG (ref 83–100)
PO2 BLDA: 104 MM HG (ref 83–100)
PO2 BLDA: 179 MM HG (ref 83–100)
PO2 BLDA: 182 MM HG (ref 83–100)
PO2 BLDA: 99 MM HG (ref 83–100)
POCT PATIENT TEMPERATURE: 98.6 °F (ref 97–99)
POCT TEST (BLD GAS): ABNORMAL
POTASSIUM BLDA-SCNC: 4 MEQ/L (ref 3.4–4.5)
POTASSIUM BLDA-SCNC: 4.2 MEQ/L (ref 3.4–4.5)
POTASSIUM BLDA-SCNC: 4.4 MEQ/L (ref 3.4–4.5)
POTASSIUM BLDA-SCNC: 4.5 MEQ/L (ref 3.4–4.5)
POTASSIUM BLDA-SCNC: 4.5 MEQ/L (ref 3.4–4.5)
POTASSIUM BLDA-SCNC: 4.7 MEQ/L (ref 3.4–4.5)
POTASSIUM SERPL-SCNC: 4.3 MEQ/L (ref 3.6–5.1)
POTASSIUM SERPL-SCNC: 4.6 MEQ/L (ref 3.6–5.1)
POTASSIUM SERPL-SCNC: 4.6 MEQ/L (ref 3.6–5.1)
POTASSIUM SERPL-SCNC: 4.8 MEQ/L (ref 3.6–5.1)
PR INTERVAL: 112
PR INTERVAL: 134
QRS DURATION: 82
QRS DURATION: 88
QT INTERVAL: 384
QT INTERVAL: 402
QTC CALCULATION(BAZETT): 436
QTC CALCULATION(BAZETT): 467
R AXIS: 52
R AXIS: 73
RBC # BLD AUTO: 2.46 M/UL (ref 3.93–5.22)
RBC # BLD AUTO: 3.24 M/UL (ref 3.93–5.22)
SAO2 % BLDA: 97 % (ref 93–98)
SAO2 % BLDA: 98 % (ref 93–98)
SODIUM BLDA-SCNC: 133 MEQ/L (ref 136–145)
SODIUM BLDA-SCNC: 134 MEQ/L (ref 136–145)
SODIUM BLDA-SCNC: 134 MEQ/L (ref 136–145)
SODIUM BLDA-SCNC: 135 MEQ/L (ref 136–145)
SODIUM SERPL-SCNC: 138 MEQ/L (ref 136–144)
T WAVE AXIS: 118
T WAVE AXIS: 65
VENTRICULAR RATE: 71
VENTRICULAR RATE: 89
WBC # BLD AUTO: 11.8 K/UL (ref 3.8–10.5)
WBC # BLD AUTO: 7.4 K/UL (ref 3.8–10.5)

## 2021-07-17 PROCEDURE — 71045 X-RAY EXAM CHEST 1 VIEW: CPT

## 2021-07-17 PROCEDURE — 85027 COMPLETE CBC AUTOMATED: CPT | Performed by: PHYSICIAN ASSISTANT

## 2021-07-17 PROCEDURE — 94660 CPAP INITIATION&MGMT: CPT

## 2021-07-17 PROCEDURE — 82330 ASSAY OF CALCIUM: CPT | Performed by: PHYSICIAN ASSISTANT

## 2021-07-17 PROCEDURE — 25800000 HC PHARMACY IV SOLUTIONS: Performed by: PHYSICIAN ASSISTANT

## 2021-07-17 PROCEDURE — 85027 COMPLETE CBC AUTOMATED: CPT | Performed by: THORACIC SURGERY (CARDIOTHORACIC VASCULAR SURGERY)

## 2021-07-17 PROCEDURE — 93010 ELECTROCARDIOGRAM REPORT: CPT | Performed by: INTERNAL MEDICINE

## 2021-07-17 PROCEDURE — 25000000 HC PHARMACY GENERAL: Performed by: PHYSICIAN ASSISTANT

## 2021-07-17 PROCEDURE — 83735 ASSAY OF MAGNESIUM: CPT | Performed by: THORACIC SURGERY (CARDIOTHORACIC VASCULAR SURGERY)

## 2021-07-17 PROCEDURE — 63700000 HC SELF-ADMINISTRABLE DRUG: Performed by: NURSE PRACTITIONER

## 2021-07-17 PROCEDURE — 36430 TRANSFUSION BLD/BLD COMPNT: CPT

## 2021-07-17 PROCEDURE — 84100 ASSAY OF PHOSPHORUS: CPT | Performed by: PHYSICIAN ASSISTANT

## 2021-07-17 PROCEDURE — 84132 ASSAY OF SERUM POTASSIUM: CPT | Performed by: PHYSICIAN ASSISTANT

## 2021-07-17 PROCEDURE — 36415 COLL VENOUS BLD VENIPUNCTURE: CPT | Performed by: PHYSICIAN ASSISTANT

## 2021-07-17 PROCEDURE — P9045 ALBUMIN (HUMAN), 5%, 250 ML: HCPCS | Mod: JW | Performed by: PHYSICIAN ASSISTANT

## 2021-07-17 PROCEDURE — 20000000 HC ROOM AND CARE ICU

## 2021-07-17 PROCEDURE — 99233 SBSQ HOSP IP/OBS HIGH 50: CPT | Performed by: INTERNAL MEDICINE

## 2021-07-17 PROCEDURE — 63700000 HC SELF-ADMINISTRABLE DRUG: Performed by: PHYSICIAN ASSISTANT

## 2021-07-17 PROCEDURE — 99233 SBSQ HOSP IP/OBS HIGH 50: CPT | Performed by: NURSE PRACTITIONER

## 2021-07-17 PROCEDURE — 83735 ASSAY OF MAGNESIUM: CPT | Performed by: PHYSICIAN ASSISTANT

## 2021-07-17 PROCEDURE — 63600000 HC DRUGS/DETAIL CODE: Performed by: PHYSICIAN ASSISTANT

## 2021-07-17 PROCEDURE — 25800000 HC PHARMACY IV SOLUTIONS: Performed by: THORACIC SURGERY (CARDIOTHORACIC VASCULAR SURGERY)

## 2021-07-17 PROCEDURE — 80048 BASIC METABOLIC PNL TOTAL CA: CPT | Performed by: PHYSICIAN ASSISTANT

## 2021-07-17 PROCEDURE — P9016 RBC LEUKOCYTES REDUCED: HCPCS

## 2021-07-17 RX ORDER — SODIUM CHLORIDE 9 MG/ML
5 INJECTION, SOLUTION INTRAVENOUS AS NEEDED
Status: DISCONTINUED | OUTPATIENT
Start: 2021-07-17 | End: 2021-07-17

## 2021-07-17 RX ADMIN — BUPROPION HYDROCHLORIDE 300 MG: 300 TABLET, EXTENDED RELEASE ORAL at 08:15

## 2021-07-17 RX ADMIN — OXYCODONE HYDROCHLORIDE 5 MG: 5 TABLET ORAL at 13:34

## 2021-07-17 RX ADMIN — OXYCODONE HYDROCHLORIDE 5 MG: 5 TABLET ORAL at 21:30

## 2021-07-17 RX ADMIN — FUROSEMIDE 40 MG: 40 TABLET ORAL at 08:12

## 2021-07-17 RX ADMIN — MUPIROCIN 1 APPLICATION.: 20 OINTMENT TOPICAL at 08:17

## 2021-07-17 RX ADMIN — OXYCODONE HYDROCHLORIDE 5 MG: 5 TABLET ORAL at 08:11

## 2021-07-17 RX ADMIN — DULOXETINE 90 MG: 60 CAPSULE, DELAYED RELEASE ORAL at 08:15

## 2021-07-17 RX ADMIN — KETOROLAC TROMETHAMINE 30 MG: 30 INJECTION, SOLUTION INTRAMUSCULAR; INTRAVENOUS at 20:23

## 2021-07-17 RX ADMIN — STANDARDIZED SENNA CONCENTRATE 2 TABLET: 8.6 TABLET ORAL at 20:23

## 2021-07-17 RX ADMIN — SODIUM CHLORIDE: 9 INJECTION, SOLUTION INTRAVENOUS at 14:16

## 2021-07-17 RX ADMIN — ACETAMINOPHEN 975 MG: 325 TABLET, FILM COATED ORAL at 18:08

## 2021-07-17 RX ADMIN — DOCUSATE SODIUM 200 MG: 100 CAPSULE ORAL at 20:23

## 2021-07-17 RX ADMIN — CLOPIDOGREL BISULFATE 75 MG: 75 TABLET ORAL at 08:13

## 2021-07-17 RX ADMIN — METOPROLOL SUCCINATE 12.5 MG: 25 TABLET, EXTENDED RELEASE ORAL at 21:30

## 2021-07-17 RX ADMIN — ACETAMINOPHEN 975 MG: 325 TABLET, FILM COATED ORAL at 12:08

## 2021-07-17 RX ADMIN — CHLORHEXIDINE GLUCONATE 0.12% ORAL RINSE 15 ML: 1.2 LIQUID ORAL at 21:30

## 2021-07-17 RX ADMIN — DOCUSATE SODIUM 200 MG: 100 CAPSULE ORAL at 08:16

## 2021-07-17 RX ADMIN — FAMOTIDINE 20 MG: 20 TABLET ORAL at 08:13

## 2021-07-17 RX ADMIN — MAGNESIUM SULFATE HEPTAHYDRATE 2 G: 40 INJECTION, SOLUTION INTRAVENOUS at 18:07

## 2021-07-17 RX ADMIN — HYDROMORPHONE HYDROCHLORIDE 0.5 MG: 1 INJECTION, SOLUTION INTRAMUSCULAR; INTRAVENOUS; SUBCUTANEOUS at 04:43

## 2021-07-17 RX ADMIN — KETOROLAC TROMETHAMINE 30 MG: 30 INJECTION, SOLUTION INTRAMUSCULAR; INTRAVENOUS at 07:02

## 2021-07-17 RX ADMIN — MUPIROCIN 1 APPLICATION.: 20 OINTMENT TOPICAL at 20:30

## 2021-07-17 RX ADMIN — POTASSIUM CHLORIDE 20 MEQ: 750 TABLET, FILM COATED, EXTENDED RELEASE ORAL at 20:22

## 2021-07-17 RX ADMIN — FAMOTIDINE 20 MG: 20 TABLET ORAL at 20:22

## 2021-07-17 RX ADMIN — NOREPINEPHRINE BITARTRATE 4 MG/250 ML (16 MCG/ML) IN 0.9 % NACL IV 4 MCG/MIN: at 14:13

## 2021-07-17 RX ADMIN — DEXTROSE MONOHYDRATE 2 G: 5 INJECTION, SOLUTION INTRAVENOUS at 08:10

## 2021-07-17 RX ADMIN — STANDARDIZED SENNA CONCENTRATE 2 TABLET: 8.6 TABLET ORAL at 08:16

## 2021-07-17 RX ADMIN — ACETAMINOPHEN 975 MG: 325 TABLET, FILM COATED ORAL at 06:07

## 2021-07-17 RX ADMIN — CALCIUM CHLORIDE 1 G: 100 INJECTION, SOLUTION INTRAVENOUS at 03:12

## 2021-07-17 RX ADMIN — MAGNESIUM SULFATE HEPTAHYDRATE 2 G: 40 INJECTION, SOLUTION INTRAVENOUS at 04:42

## 2021-07-17 RX ADMIN — COLCHICINE 0.6 MG: 0.6 TABLET, FILM COATED ORAL at 08:13

## 2021-07-17 RX ADMIN — GABAPENTIN 300 MG: 300 CAPSULE ORAL at 20:23

## 2021-07-17 RX ADMIN — Medication 81 MG: at 08:13

## 2021-07-17 RX ADMIN — ATORVASTATIN CALCIUM 40 MG: 40 TABLET, FILM COATED ORAL at 21:30

## 2021-07-17 RX ADMIN — MAGNESIUM OXIDE TAB 400 MG (241.3 MG ELEMENTAL MG) 400 MG: 400 (241.3 MG) TAB at 20:22

## 2021-07-17 RX ADMIN — MONTELUKAST 10 MG: 10 TABLET, FILM COATED ORAL at 21:30

## 2021-07-17 RX ADMIN — ALBUMIN (HUMAN) 250 ML: 12.5 SOLUTION INTRAVENOUS at 06:06

## 2021-07-17 RX ADMIN — GABAPENTIN 300 MG: 300 CAPSULE ORAL at 08:14

## 2021-07-17 RX ADMIN — MAGNESIUM OXIDE TAB 400 MG (241.3 MG ELEMENTAL MG) 400 MG: 400 (241.3 MG) TAB at 08:16

## 2021-07-17 RX ADMIN — Medication 1 TABLET: at 08:16

## 2021-07-17 NOTE — PROGRESS NOTES
Cardiology Progress Note   [facility]    [facility] [address]  [facility] [address]  [address] [place] [address]    TEL  [phone]  [facility]       Patient: Brian Dean  MRN: [id number]  : 1960  Admission Date: 2021     Brian Dean is a 60 y.o. female with history of coronary artery dissection  who was admitted to [facility] for evaluation of recurrent chest pain.  CTA 2021 revealed 70% LAD stenosis with myocardial bridge of distal LAD.  Subsequent coronary angiography 2021 revealed 70% mid LAD focal stenosis suggestive of FMD versus IMH, also notable for normal cardiac output and index (6.32/3.9), low normal filling pressures.  TTE 7/15/2021 revealed normal biventricular size and function. The patient is postop day 1 status post LIMA -> LAD 2021.  The patient is treated with aspirin and Plavix, high-dose statin, and has been diuresed with Lasix 40 mg p.o. twice daily.  Patient remains on low-dose Precedex, low-dose norepinephrine overnight.      Scheduled Meds:  • acetaminophen  975 mg oral q6h YOON   • aspirin  81 mg oral Daily   • atorvastatin  40 mg oral Nightly   • buPROPion XL  300 mg oral Daily   • ceFAZolin  2 g intravenous q8h INT   • chlorhexidine  15 mL Mouth/Throat 2 times per day   • clopidogreL  75 mg oral Daily   • colchicine  0.6 mg oral Daily   • docusate sodium  200 mg oral BID   • DULoxetine  90 mg oral Daily   • famotidine  20 mg intravenous BID    Or   • famotidine  20 mg oral BID   • furosemide  40 mg oral BID (am, 4p)    Followed by   • [START ON 2021] furosemide  40 mg oral Daily   • gabapentin  300 mg oral BID   • lidocaine PF       • magnesium oxide  400 mg oral BID   • metoprolol succinate XL  25 mg oral Nightly    Or   • metoprolol succinate XL  50 mg oral Nightly   • montelukast  10 mg oral Nightly   • multivitamin  1 tablet oral Daily   • mupirocin  1 application Each Nostril BID  "  • potassium chloride  20 mEq oral BID   • senna  2 tablet oral BID     Continuous Infusions:  • dexmedetomidine in 0.9 % NaCl  0.2-1.5 mcg/kg/hr 0.2 mcg/kg/hr (07/17/21 0400)   • insulin  0-15 Units/hr Stopped (07/16/21 1745)   • norepinephrine  0.5-8 mcg/min 2 mcg/min (07/17/21 0400)   • sodium chloride 0.9 %   50 mL/hr at 07/17/21 0400     PRN Meds:.albumin human  •  alum-mag hydroxide-simeth  •  atropine  •  [START ON 7/19/2021] bisacodyL  •  calcium chloride  •  calcium chloride  •  insulin **AND** dextrose in water  •  diphenhydrAMINE **OR** diphenhydrAMINE  •  HYDROmorphone  •  ketorolac  •  LORazepam  •  magnesium sulfate  •  meperidine  •  ondansetron ODT **OR** ondansetron  •  oxyCODONE  •  potassium chloride  •  sodium bicarbonate      Intake/Output Summary (Last 24 hours) at 7/17/2021 0533  Last data filed at 7/17/2021 0300  Gross per 24 hour   Intake 3532.73 ml   Output 1805 ml   Net 1727.73 ml        Weights (last 7 days)     Date/Time   Weight    07/16/21 1111   57.6 kg (127 lb)    07/16/21 0500   57.6 kg (127 lb)    07/15/21 1102   57.6 kg (127 lb)    07/14/21 1735   58 kg (127 lb 12.8 oz)    07/14/21 0817   56.7 kg (125 lb)               Wt Readings from Last 3 Encounters:   07/16/21 57.6 kg (127 lb)   07/09/21 56.2 kg (124 lb)        REVIEW OF SYSTEMS:   Aside from what is mentioned above, a 14 point review of systems was performed and was negative.    PHYSICAL EXAMINATION:  Visit Vitals  BP (!) 75/37   Pulse 71   Temp 36.5 °C (97.7 °F) (Temporal)   Resp (!) 21   Ht 1.676 m (5' 5.98\")   Wt 57.6 kg (127 lb)   SpO2 (!) 91%   BMI 20.51 kg/m²     Body surface area is 1.64 meters squared.  Body mass index is 20.51 kg/m².  General: No acute distress.  HEENT: No corneal arcus or xanthelasmas.  Sclerae are anicteric.   Neck: JVP is normal.  Heart: Regular rhythm. No murmurs.  Chest: Symmetrical.  Lungs: Clear to auscultation bilaterally.  Abdomen: Soft.  Extremities: No cyanosis or clubbing. Trace lower " extremity edema.  Distal pulses are easily palpable.  Skin: Warm and dry and well perfused.  Neurologic: Alert and appropriate.  Psychiatric: Normal affect.    Labs   Results from last 7 days   Lab Units 07/17/21 0208 07/16/21 2138 07/16/21  1744 07/14/21  1903 07/14/21  1903   SODIUM mEQ/L 138 138 134*   < > 139   POTASSIUM mEQ/L 4.6 3.9  3.9 4.3   < > 4.3   CHLORIDE mEQ/L 104 103 103   < > 104   CO2 mEQ/L 25 26 20*   < > 22   BUN mg/dL 10 9 9   < > 16   CREATININE mg/dL 0.8 0.8 0.8   < > 0.9   CALCIUM mg/dL 8.4* 8.0* 9.0   < > 9.7   ALBUMIN g/dL  --   --   --   --  4.1   BILIRUBIN TOTAL mg/dL  --   --   --   --  0.7   ALK PHOS IU/L  --   --   --   --  85   ALT IU/L  --   --   --   --  23   AST IU/L  --   --   --   --  23   GLUCOSE mg/dL 137* 137* 149*   < > 83    < > = values in this interval not displayed.             Results from last 7 days   Lab Units 07/17/21 0208 07/16/21 2138 07/16/21 1744   WBC K/uL 11.80* 12.89* 17.58*   HEMOGLOBIN g/dL 7.4* 7.7* 10.0*   HEMATOCRIT % 22.4* 23.1* 29.3*   PLATELETS K/uL 211 191 251       Lab Results   Component Value Date    CHOL 150 07/14/2021    HDL 77 07/14/2021    LDLCALC 58 07/14/2021    TRIG 73 07/14/2021    TSH 2.33 07/14/2021    HGBA1C 5.5 07/14/2021     Cardiac Studies  Cardiac Imaging    TRANSTHORACIC ECHO (TTE) COMPLETE 07/15/2021    Interpretation Summary  Technically difficult study.    1. Normal-sized left ventricle. Normal left ventricular wall thickness. Preserved left ventricular systolic function. Estimated EF 60% on 3D imaging. No regional wall motion abnormalities. There is impaired relaxation. Normal global longitudinal strain (-19.1% on Fernandez Epiq).  2. Normal-sized left atrium. (left atrial volume indexed by body surface area 24 ml/m2).  3. Mitral valve leaflet thickening. Mild mitral annular calcification. Trace mitral valve regurgitation.  4. Three-cusped aortic valve. Sclerotic aortic valve leaflets. No aortic valve regurgitation.  5.  Aortic root and ascending aorta are normal-sized.  6. Normal-sized right ventricle. Normal right ventricular systolic function. (Right ventricular S' 12 cm/s).  7. Normal-sized right atrium.  8. Tricuspid valve structure is normal. Trace tricuspid valve regurgitation with calculated right ventricular systolic pressure of 26 mmHg.  9. Pulmonic valve not well seen.  10. Inferior vena cava is <2.1 cm and collapses <50% during inspiration. (estimated right atrial pressure is 8 mmHg).  11. There is a trivial pericardial effusion.  12. No prior TTE to compare.    Telemetry  Sinus rhythm with 2 brief episodes of AI VR overnight    Assessment:  This is a 60 y.o. female being consulted for optimization status post robotic LIMA -> LAD.    #CAD: Suspect intramural hematoma versus spontaneous dissection at the ostium of LAD, status post robotic LIMA to LAD 7/16/2021.  Postoperative course notable for some hypotension overnight requiring Levophed 3 mcg/min.  Remains on aspirin and Plavix.  Borderline hemoglobin 7.7-7.2 noted.  -Continue aspirin and Plavix  -Continue high-dose statin  -Postoperative optimization of surgical drains per primary team  -Recommend standing weight today for further titration of Lasix.  Patient has benefited from albumin in the setting of uptrending Levophed requirement,  now with CVP 10 mmHg suggesting she is euvolemic.     Final recommendations are pending attending co-signature. Please page Cardiology at [phone] with any questions.     Tim Díaz MD  7/17/2021

## 2021-07-17 NOTE — CONSULTS
"Brief Nutrition Note    Recommendations   1. Advance diet to regular, cardiac as feasible  2. RD will attach heart healthy diet info to discharge paperwork       Clinical Course: Patient is a 60 y.o. female who was admitted on 7/14/2021 with a diagnosis of Non-ST elevation myocardial infarction (NSTEMI) (CMS/[facility]) [I21.4]  Coronary artery disease involving native coronary artery of native heart without angina pectoris [I25.10]  CAD (coronary artery disease) [I25.10]  Coronary artery dissection [I25.42].     Past Medical History:   Diagnosis Date   • Allergies    • Anxiety    • Asthma    • Coronary artery disease    • Coronary artery dissection    • Lipid disorder    • NSTEMI (non-ST elevated myocardial infarction) (CMS/[facility])    • Parvovirus B19 infection      Past Surgical History:   Procedure Laterality Date   • REDUCTION MAMMAPLASTY         Reason for Assessment  Reason For Assessment: physician consult     [facility] Nutrition Screen Tool  Has patient lost weight without trying?: 0-->Yes  If yes,how much weight has been lost?: 1-->2-13 pounds  Has patient been eating poorly due to decreased appetite?: 0-->No  [facility] Nutrition Screen Score: 1     Nutrition/Diet History  Intake (%): 75%    Physical Findings  Last Bowel Movement: 07/13/21  Skin: surgical incision     [id number] Physical Appearance  Last Bowel Movement: 07/13/21  Skin: surgical incision     Nutrition Order  Nutrition Order: does not meet nutritional requirements  Nutrition Order Comments: CL, ROSMERY     Anthropometrics  Height: 167.6 cm (5' 5.98\")           Current Weight  Weight Method: Standing scale, Bed scale  Weight: 60.3 kg (132 lb 15 oz)     Ideal Body Weight (IBW)  Ideal Body Weight (IBW) (kg): 59.54  % Ideal Body Weight: 96.75            Body Mass Index (BMI)  BMI (Calculated): 21.5     Labs/Procedures/Meds  Lab Results Reviewed: reviewed, pertinent   BMP Results       07/17/21 07/17/21 07/17/21                    0827 0621 0208         NA -- -- 138         K " 4.6 4.8 4.6         Cl -- -- 104         CO2 -- -- 25         Glucose -- -- 137         BUN -- -- 10         Creatinine -- -- 0.8         Calcium -- -- 8.4         Anion Gap -- -- 9         EGFR -- -- >60.0                           Medications  Pertinent Medications Reviewed: reviewed, pertinent   • acetaminophen  975 mg oral q6h YOON   • aspirin  81 mg oral Daily   • atorvastatin  40 mg oral Nightly   • buPROPion XL  300 mg oral Daily   • chlorhexidine  15 mL Mouth/Throat 2 times per day   • clopidogreL  75 mg oral Daily   • colchicine  0.6 mg oral Daily   • docusate sodium  200 mg oral BID   • DULoxetine  90 mg oral Daily   • famotidine  20 mg intravenous BID    Or   • famotidine  20 mg oral BID   • furosemide  40 mg oral BID (am, 4p)    Followed by   • [START ON 7/20/2021] furosemide  40 mg oral Daily   • gabapentin  300 mg oral BID   • magnesium oxide  400 mg oral BID   • metoprolol succinate XL  25 mg oral Nightly    Or   • metoprolol succinate XL  50 mg oral Nightly   • montelukast  10 mg oral Nightly   • multivitamin  1 tablet oral Daily   • mupirocin  1 application Each Nostril BID   • potassium chloride  20 mEq oral BID   • senna  2 tablet oral BID       Clinical comments: CTICU c/s. S/p Robo CABG. Very sleepy, tolerating liquids. Weight stable, no food allergies. Will attach heart healthy diet info to discharge paperwork.    Goals: meet >75% needs via oral itnake  Monitor: po intake    Recommendations: See above       Date: 07/17/21  Signature: TERESSA Esparza

## 2021-07-17 NOTE — PROGRESS NOTES
Critical Care History and Physical    Admitting Diagnosis: Non-ST elevation myocardial infarction (NSTEMI) (CMS/[facility]) [I21.4]  Coronary artery disease involving native coronary artery of native heart without angina pectoris [I25.10]  CAD (coronary artery disease) [I25.10]  Coronary artery dissection [I25.42]    HPI  Patient is a 60 y.o. female who presents after NSTEMI for ROBO HEARD->LAD.     Known history of coronary artery dissection 2005 who was admitted to [facility] [place] for evaluation of recurrent chest pain.  CTA 6/20/2021 revealed 70% LAD stenosis with myocardial bridge of distal LAD.  Subsequent coronary angiography 7/14/2021 revealed 70% mid LAD focal stenosis suggestive of FMD versus IMH, also notable for normal cardiac output and index (6.32/3.9), low normal filling pressures.  TTE 7/15/2021 revealed normal biventricular size and function. The patient is postop day 1 status post LIMA -> LAD 7/16/2021.  The patient is treated with aspirin and Plavix, high-dose statin, and has been diuresed with Lasix 40 mg p.o. twice daily.  Patient remains on low-dose Precedex, low-dose norepinephrine overnight.    Medical History:   Past Medical History:   Diagnosis Date   • Allergies    • Anxiety    • Asthma    • Coronary artery disease    • Coronary artery dissection    • Lipid disorder    • NSTEMI (non-ST elevated myocardial infarction) (CMS/[facility])    • Parvovirus B19 infection      Surgical History:   Past Surgical History:   Procedure Laterality Date   • REDUCTION MAMMAPLASTY         Allergies: Sulfa (sulfonamide antibiotics) and Sulfamethoxazole-trimethoprim    Current Inpatient Medications   Medication Dose Route Frequency Provider Last Rate Last Admin   • acetaminophen (TYLENOL) tablet 975 mg  975 mg oral q6h [facility] Compa Goss PA C   975 mg at 07/17/21 0607   • albumin human 5 % solution 500 mL  500 mL intravenous PRN Compa Goss PA C   250 mL at 07/17/21 0606   • alum-mag hydroxide-simeth (MAALOX) 200-200-20  mg/5 mL suspension 30 mL  30 mL oral q4h PRN Compa Goss PA C       • aspirin enteric coated tablet 81 mg  81 mg oral Daily Compa Goss PA C   81 mg at 07/17/21 0813   • atorvastatin (LIPITOR) tablet 40 mg  40 mg oral Nightly Compa Goss PA C   40 mg at 07/15/21 2128   • atropine injection 0.5 mg  0.5 mg intravenous q5 min PRN Compa Goss PA C       • [START ON 7/19/2021] bisacodyL (DULCOLAX) 10 mg suppository 10 mg  10 mg rectal PRN Compa Goss PA C       • buPROPion XL (WELLBUTRIN XL) 24 hr ER tablet 300 mg  300 mg oral Daily Compa Goss PA C   300 mg at 07/17/21 0815   • calcium chloride 1 g in sodium chloride 0.9 % 50 mL IVPB  1 g intravenous q6h PRN Compa Goss PA C   Stopped at 07/17/21 0342   • calcium chloride 100 mg/mL (10 %) injection 0.5 g  0.5 g intravenous Once PRN Compa Goss PA C       • ceFAZolin (ANCEF) IVPB 2 g/50 mL D5W  2 g intravenous q8h INT Compa Goss PA C 100 mL/hr at 07/17/21 0810 2 g at 07/17/21 0810   • chlorhexidine (PERIDEX) 0.12 % mouthwash 15 mL  15 mL Mouth/Throat 2 times per day Compa Gsos PA C   15 mL at 07/16/21 2200   • clopidogreL (PLAVIX) tablet 75 mg  75 mg oral Daily Compa Goss PA C   75 mg at 07/17/21 0813   • colchicine (COLCRYS) tablet 0.6 mg  0.6 mg oral Daily Compa Goss PA C   0.6 mg at 07/17/21 0813   • dexmedeTOMIDine in 0.9 % NaCL (PRECEDEX) 400 mcg/100 mL (4 mcg/mL) infusion  0.2-1.5 mcg/kg/hr intravenous Titrated Compa Goss PA C 2.88 mL/hr at 07/17/21 0800 0.2 mcg/kg/hr at 07/17/21 0800   • insulin regular U-100 (HumuLIN R,NovoLIN R) 100 Units in sodium chloride 0.9 % 100 mL (1 Units/mL) infusion  0-15 Units/hr intravenous Titrated Compa Goss PA C   Stopped at 07/16/21 1745    And   • dextrose in water injection 5-15 g  10-30 mL intravenous PRN Compa Goss PA C       • diphenhydrAMINE (BENADRYL) capsule 25 mg  25 mg oral q6h PRN Ana Paula,  VANITA Lopez        Or   • diphenhydrAMINE (BENADRYL) injection 25 mg  25 mg intravenous q6h PRN Compa Goss PA C       • docusate sodium (COLACE) capsule 200 mg  200 mg oral BID Compa Goss PA C   200 mg at 07/17/21 0816   • DULoxetine (CYMBALTA) capsule,delayed release(DR/EC) 90 mg  90 mg oral Daily Compa Goss PA C   90 mg at 07/17/21 0815   • famotidine (PEPCID) injection 20 mg  20 mg intravenous BID Compa Goss PA C   20 mg at 07/16/21 2007    Or   • famotidine (PEPCID) tablet 20 mg  20 mg oral BID Compa Goss PA C   20 mg at 07/17/21 0813   • furosemide (LASIX) tablet 40 mg  40 mg oral BID (am, 4p) Compa Goss PA C   40 mg at 07/17/21 0812    Followed by   • [START ON 7/20/2021] furosemide (LASIX) tablet 40 mg  40 mg oral Daily Compa Goss PA C       • gabapentin (NEURONTIN) capsule 300 mg  300 mg oral BID Compa Goss PA C   300 mg at 07/17/21 0814   • HYDROmorphone (DILAUDID) injection 0.5 mg  0.5 mg intravenous q1h PRN Compa Goss PA C   0.5 mg at 07/17/21 0443   • ketorolac (TORADOL) injection 30 mg  30 mg intravenous q6h PRN Compa Goss PA C   30 mg at 07/17/21 0702   • LORazepam (ATIVAN) tablet 0.5 mg  0.5 mg oral q6h PRN Compa Goss PA C   0.5 mg at 07/16/21 0616   • magnesium oxide (MAG-OX) tablet 400 mg  400 mg oral BID Compa Goss PA C   400 mg at 07/17/21 0816   • magnesium sulfate IVPB 2g in 50 mL NSS/D5W/SWFI  2 g intravenous PRN Compa Goss PA C   Stopped at 07/17/21 0642   • meperidine (DEMEROL) injection 12.5 mg  12.5 mg intravenous PRN Compa Goss PA C       • metoprolol succinate XL (TOPROL-XL) 24 hr ER tablet 25 mg  25 mg oral Nightly Compa Goss PA C        Or   • metoprolol succinate XL (TOPROL-XL) 24 hr ER tablet 50 mg  50 mg oral Nightly Compa Goss PA C       • montelukast (SINGULAIR) tablet 10 mg  10 mg oral Nightly Compa Goss PA C   10 mg at 07/15/21  2128   • multivitamin tablet 1 tablet  1 tablet oral Daily Compa Goss PA C   1 tablet at 07/17/21 0816   • mupirocin (BACTROBAN) 2 % ointment 1 application  1 application Each Nostril BID Compa Goss PA C   1 application at 07/17/21 0817   • norepinephrine (LEVOPHED) 4 mg/250 mL (16 mcg/mL) in 0.9 % NaCl infusion  0.5-8 mcg/min intravenous Titrated Compa Goss PA C 15 mL/hr at 07/17/21 0800 4 mcg/min at 07/17/21 0800   • ondansetron ODT (ZOFRAN-ODT) disintegrating tablet 4 mg  4 mg oral q8h PRN Compa Goss PA C        Or   • ondansetron (ZOFRAN) injection 4 mg  4 mg intravenous q8h PRN Compa Goss PA C       • oxyCODONE (ROXICODONE) immediate release tablet 5 mg  5 mg oral q4h PRN Compa Goss PA C   5 mg at 07/17/21 0811   • potassium chloride (KLOR-CON) tablet extended release 20 mEq  20 mEq oral BID Compa Goss PA C       • potassium chloride 20 mEq in 100 mL IVPB  (premix)  20 mEq intravenous q8h PRN Compa Goss PA C   Stopped at 07/16/21 2101   • senna (SENOKOT) tablet 2 tablet  2 tablet oral BID Compa Goss PA C   2 tablet at 07/17/21 0816   • sodium bicarbonate 8.4 % (1 mEq/mL) injection  mEq   mEq intravenous PRN Compa Goss PA C   50 mEq at 07/16/21 2134   • sodium chloride 0.9 % infusion   intravenous Continuous Felix Nix DO 50 mL/hr at 07/17/21 0800 Rate Verify at 07/17/21 0800     Social History:   Social History     Socioeconomic History   • Marital status:      Spouse name: None   • Number of children: None   • Years of education: None   • Highest education level: None   Occupational History   • None   Tobacco Use   • Smoking status: Never Smoker   • Smokeless tobacco: Never Used   Substance and Sexual Activity   • Alcohol use: Never   • Drug use: Never   • Sexual activity: None   Other Topics Concern   • None   Social History Narrative   • None     Social Determinants of Health     Financial  "Resource Strain:    • Difficulty of Paying Living Expenses:    Food Insecurity:    • Worried About Running Out of Food in the Last Year:    • Ran Out of Food in the Last Year:    Transportation Needs:    • Lack of Transportation (Medical):    • Lack of Transportation (Non-Medical):    Physical Activity:    • Days of Exercise per Week:    • Minutes of Exercise per Session:    Stress:    • Feeling of Stress :    Social Connections:    • Frequency of Communication with Friends and Family:    • Frequency of Social Gatherings with Friends and Family:    • Attends Anabaptism Services:    • Active Member of Clubs or Organizations:    • Attends Club or Organization Meetings:    • Marital Status:    Intimate Partner Violence:    • Fear of Current or Ex-Partner:    • Emotionally Abused:    • Physically Abused:    • Sexually Abused:      Family History:   Family History   Problem Relation Age of Onset   • Hypertension Biological Mother    • Heart disease Biological Brother      Review of Systems  Positive for operative site pain  Breathing 'a little short' but stable  Other ROS non-contributoory    Objective     Vital Signs for the last 24 hours:  Temp:  [36.5 °C (97.7 °F)] 36.5 °C (97.7 °F)  Heart Rate:  [59-88] 76  Resp:  [16-22] 16  BP: ()/(37-67) 106/51  FiO2 (%) (Set):  [40 %] 40 %     FiO2 (%) (Set): 40 %  Resp Rate (Set): 18        PEEP/CPAP (Set, cmH2O): 5 cm H20    Visit Vitals  BP (!) 106/51   Pulse 76   Temp 36.5 °C (97.7 °F) (Temporal)   Resp 16   Ht 1.676 m (5' 5.98\")   Wt 60.3 kg (132 lb 15 oz)   SpO2 93%   BMI 21.47 kg/m²       General Appearance:    Alert, cooperative, no distress, appears stated age   Head:    Normocephalic, without obvious abnormality, atraumatic   Eyes:    PERRL, conjunctiva/corneas clear, EOM's intact, fundi     benign, both eyes        Ears:    Normal TM's and external ear canals, both ears   Nose:   Nares normal, septum midline, mucosa normal, no drainage    or sinus   tenderness "   Throat:   Lips, mucosa pale, and tongue normal; teeth and gums normal   Neck:   Supple, symmetrical, trachea midline, no adenopathy;        thyroid:  No enlargement/tenderness/nodules; no carotid    bruit or JVD   Back:     Symmetric, no curvature, ROM normal, no CVA tenderness   Lungs:     Diminished significantly on left, respirations unlabored   Chest wall:    No tenderness or deformity   Heart:    Regular rate and rhythm, S1 and S2 normal, no murmur, rub   or gallop   Abdomen:     Soft, non-tender, bowel sounds active all four quadrants,     no masses, no organomegaly   Genitalia:    Normal male without lesion, discharge or tenderness   Rectal:    Normal tone, normal prostate, no masses or tenderness;    guaiac negative stool   Extremities:  Musculoskeletal:   Extremities normal, atraumatic, no cyanosis or edema    No injury or deformity   Pulses:   2+ and symmetric all extremities   Skin:   Skin color, texture, turgor normal, no rashes or lesions, left thoracic incision and chest tube sites clean and dry.   Lymph nodes:   Cervical, supraclavicular, and axillary nodes normal   Neurologic:    Behavior/  Emotional:   CNII-XII intact. Normal strength, sensation and reflexes       throughout    Appropriate, cooperative     Labs   I have reviewed the patient's pertinent labs. Pertinent labs are within normal limits.    Imaging  I have independently reviewed the pertinent imaging from the last 24 hrs.    ECG/Telemetry  I have independently reviewed the telemetry. No events for the last 24 hours.    Assessment/Plan   Examination of the patient performed at the bedside. Rounded with ICU team and discussed management/plan with surgical staff. Medications, radiological studies and labs reviewed and discussed with attending of record, Dr. Felix Nix.     Cardiothoracic Assessment and Plan:     Neurologic: A&Ox3 and pain controlled.      Cardiovascular: Severe CAD s/p CABGx1, also with a preserved EF, dyslipidemia and  essential hypertension. On low dose vasoactive medication for cardiac and vascular support. Hypotension likely related to combination of hypovolemia and myocardial stunning. When vasoactive medication weaned becomes extremely labile. Will resuscitate, monitor MVO2/CO, wean vasoactive medication and continue to optimize cardiac medications. Continue statin for dyslipidemia and Continue ASA/Plavix.  Continue Colchicine for pericarditis prophylaxis    Respiratory: I personally reviewed the CXR which portrayed left sided effusion increased in size and post-operative changes. Will encourage IS, mobilization and wean O2 PRN. Monitor left sided pleural effusion      Genitourinary: Continue eng for strict I/O. Making adequate urine output. Will avoid nephrotoxic medications.     Endocrine: Follow FSBG.      Hematologic: No evidence active bleeding. Lovenox for DVT ppx. Acute (expected) blood loss anemia from surgery.  Will transfuse 2 units PRBCs. Monitor hgb    Infectious Disease: No indication for antibiotics at this time.     Fluids, Electrolytes: Electrolytes replaced per protocol.     Gastrointestinal: PO as tolerated. GI ppx.     Skin: OOB, PT when able.      Tubes, lines and drains: Will remove superfluous invasive monitors PRN.    Disposition: Critically ill.  Vasoactive support s/p major invasive cardiac surgery. Continue ICU care.     VTE Assessment: TBD  Code Status: Full Code  Estimated discharge date: 7/20/2021

## 2021-07-18 ENCOUNTER — APPOINTMENT (INPATIENT)
Dept: RADIOLOGY | Facility: HOSPITAL | Age: 61
DRG: 233 | End: 2021-07-18
Attending: NURSE PRACTITIONER
Payer: COMMERCIAL

## 2021-07-18 ENCOUNTER — APPOINTMENT (INPATIENT)
Dept: RADIOLOGY | Facility: HOSPITAL | Age: 61
DRG: 233 | End: 2021-07-18
Attending: THORACIC SURGERY (CARDIOTHORACIC VASCULAR SURGERY)
Payer: COMMERCIAL

## 2021-07-18 LAB
ANION GAP SERPL CALC-SCNC: 5 MEQ/L (ref 3–15)
BASE EXCESS BLDA CALC-SCNC: 0.3 MEQ/L
BUN SERPL-MCNC: 11 MG/DL (ref 8–20)
CA-I BLD-SCNC: 1.19 MMOL/L (ref 1.15–1.27)
CA-I BLD-SCNC: 1.2 MMOL/L (ref 1.15–1.27)
CALCIUM SERPL-MCNC: 8.2 MG/DL (ref 8.9–10.3)
CHLORIDE BLDA-SCNC: 105 MEQ/L (ref 98–109)
CHLORIDE SERPL-SCNC: 105 MEQ/L (ref 98–109)
CO2 BLDA-SCNC: 27 MEQ/L (ref 22–32)
CO2 SERPL-SCNC: 25 MEQ/L (ref 22–32)
CREAT SERPL-MCNC: 0.8 MG/DL (ref 0.6–1.1)
ERYTHROCYTE [DISTWIDTH] IN BLOOD BY AUTOMATED COUNT: 13.7 % (ref 11.7–14.4)
GFR SERPL CREATININE-BSD FRML MDRD: >60 ML/MIN/1.73M*2
GLUCOSE BLDA-MCNC: 125 MG/DL (ref 70–99)
GLUCOSE SERPL-MCNC: 131 MG/DL (ref 70–99)
HCO3 BLDA-SCNC: 25 MEQ/L (ref 21–28)
HCT VFR BLDCO AUTO: 25.1 % (ref 35–45)
HCT VFR BLDCO AUTO: 27.3 % (ref 35–45)
HGB BLD-MCNC: 8.6 G/DL (ref 11.8–15.7)
HGB BLD-MCNC: 9.1 G/DL (ref 11.8–15.7)
HGB BLDA-MCNC: 9.3 G/DL (ref 12–16)
LACTATE BLDA-SCNC: 0.9 MMOL/L (ref 0.4–1.6)
MAGNESIUM SERPL-MCNC: 1.8 MG/DL (ref 1.8–2.5)
MAGNESIUM SERPL-MCNC: 2.1 MG/DL (ref 1.8–2.5)
MCH RBC QN AUTO: 30 PG (ref 28–33.2)
MCHC RBC AUTO-ENTMCNC: 34.3 G/DL (ref 32.2–35.5)
MCV RBC AUTO: 87.5 FL (ref 83–98)
PCO2 BLDA: 42 MM HG (ref 35–48)
PDW BLD AUTO: 10.7 FL (ref 9.4–12.3)
PH BLDA: 7.39 PH (ref 7.35–7.45)
PHOSPHATE SERPL-MCNC: 2.1 MG/DL (ref 2.4–4.7)
PLATELET # BLD AUTO: 157 K/UL (ref 150–369)
PO2 BLDA: 108 MM HG (ref 83–100)
POCT PATIENT TEMPERATURE: 98.6 °F (ref 97–99)
POCT TEST (BLD GAS): ABNORMAL
POTASSIUM BLDA-SCNC: 4.5 MEQ/L (ref 3.4–4.5)
POTASSIUM SERPL-SCNC: 4.2 MEQ/L (ref 3.6–5.1)
POTASSIUM SERPL-SCNC: 4.5 MEQ/L (ref 3.6–5.1)
RBC # BLD AUTO: 2.87 M/UL (ref 3.93–5.22)
SAO2 % BLDA: 97 % (ref 93–98)
SODIUM BLDA-SCNC: 134 MEQ/L (ref 136–145)
SODIUM SERPL-SCNC: 135 MEQ/L (ref 136–144)
WBC # BLD AUTO: 8.89 K/UL (ref 3.8–10.5)

## 2021-07-18 PROCEDURE — 97162 PT EVAL MOD COMPLEX 30 MIN: CPT | Mod: GP

## 2021-07-18 PROCEDURE — 63700000 HC SELF-ADMINISTRABLE DRUG: Performed by: NURSE PRACTITIONER

## 2021-07-18 PROCEDURE — 80048 BASIC METABOLIC PNL TOTAL CA: CPT | Performed by: PHYSICIAN ASSISTANT

## 2021-07-18 PROCEDURE — 63700000 HC SELF-ADMINISTRABLE DRUG: Performed by: PHYSICIAN ASSISTANT

## 2021-07-18 PROCEDURE — 25800000 HC PHARMACY IV SOLUTIONS: Performed by: NURSE PRACTITIONER

## 2021-07-18 PROCEDURE — 71045 X-RAY EXAM CHEST 1 VIEW: CPT

## 2021-07-18 PROCEDURE — 63600000 HC DRUGS/DETAIL CODE: Performed by: PHYSICIAN ASSISTANT

## 2021-07-18 PROCEDURE — 85027 COMPLETE CBC AUTOMATED: CPT | Performed by: PHYSICIAN ASSISTANT

## 2021-07-18 PROCEDURE — 83735 ASSAY OF MAGNESIUM: CPT | Performed by: PHYSICIAN ASSISTANT

## 2021-07-18 PROCEDURE — 36415 COLL VENOUS BLD VENIPUNCTURE: CPT | Performed by: PHYSICIAN ASSISTANT

## 2021-07-18 PROCEDURE — 82330 ASSAY OF CALCIUM: CPT | Performed by: PHYSICIAN ASSISTANT

## 2021-07-18 PROCEDURE — 99233 SBSQ HOSP IP/OBS HIGH 50: CPT | Performed by: STUDENT IN AN ORGANIZED HEALTH CARE EDUCATION/TRAINING PROGRAM

## 2021-07-18 PROCEDURE — 83735 ASSAY OF MAGNESIUM: CPT | Performed by: THORACIC SURGERY (CARDIOTHORACIC VASCULAR SURGERY)

## 2021-07-18 PROCEDURE — 85014 HEMATOCRIT: CPT | Performed by: THORACIC SURGERY (CARDIOTHORACIC VASCULAR SURGERY)

## 2021-07-18 PROCEDURE — 25000000 HC PHARMACY GENERAL: Performed by: NURSE PRACTITIONER

## 2021-07-18 PROCEDURE — 84132 ASSAY OF SERUM POTASSIUM: CPT | Performed by: THORACIC SURGERY (CARDIOTHORACIC VASCULAR SURGERY)

## 2021-07-18 PROCEDURE — 20000000 HC ROOM AND CARE ICU

## 2021-07-18 PROCEDURE — 200200 PR NO CHARGE: Performed by: NURSE PRACTITIONER

## 2021-07-18 PROCEDURE — 85018 HEMOGLOBIN: CPT | Performed by: THORACIC SURGERY (CARDIOTHORACIC VASCULAR SURGERY)

## 2021-07-18 PROCEDURE — 84100 ASSAY OF PHOSPHORUS: CPT | Performed by: PHYSICIAN ASSISTANT

## 2021-07-18 RX ADMIN — DOCUSATE SODIUM 200 MG: 100 CAPSULE ORAL at 08:24

## 2021-07-18 RX ADMIN — CHLORHEXIDINE GLUCONATE 0.12% ORAL RINSE 15 ML: 1.2 LIQUID ORAL at 08:23

## 2021-07-18 RX ADMIN — GABAPENTIN 300 MG: 300 CAPSULE ORAL at 20:26

## 2021-07-18 RX ADMIN — LORAZEPAM 0.5 MG: 0.5 TABLET ORAL at 03:26

## 2021-07-18 RX ADMIN — MONTELUKAST 10 MG: 10 TABLET, FILM COATED ORAL at 20:57

## 2021-07-18 RX ADMIN — ACETAMINOPHEN 975 MG: 325 TABLET, FILM COATED ORAL at 03:26

## 2021-07-18 RX ADMIN — Medication 81 MG: at 08:24

## 2021-07-18 RX ADMIN — POTASSIUM CHLORIDE 20 MEQ: 14.9 INJECTION, SOLUTION INTRAVENOUS at 13:15

## 2021-07-18 RX ADMIN — ACETAMINOPHEN 975 MG: 325 TABLET, FILM COATED ORAL at 12:10

## 2021-07-18 RX ADMIN — OXYCODONE HYDROCHLORIDE 5 MG: 5 TABLET ORAL at 20:24

## 2021-07-18 RX ADMIN — GABAPENTIN 300 MG: 300 CAPSULE ORAL at 08:24

## 2021-07-18 RX ADMIN — COLCHICINE 0.6 MG: 0.6 TABLET, FILM COATED ORAL at 08:24

## 2021-07-18 RX ADMIN — FAMOTIDINE 20 MG: 20 TABLET ORAL at 08:24

## 2021-07-18 RX ADMIN — MUPIROCIN 1 APPLICATION.: 20 OINTMENT TOPICAL at 08:25

## 2021-07-18 RX ADMIN — DULOXETINE 90 MG: 60 CAPSULE, DELAYED RELEASE ORAL at 08:24

## 2021-07-18 RX ADMIN — POTASSIUM CHLORIDE 20 MEQ: 750 TABLET, FILM COATED, EXTENDED RELEASE ORAL at 20:27

## 2021-07-18 RX ADMIN — MUPIROCIN 1 APPLICATION.: 20 OINTMENT TOPICAL at 20:27

## 2021-07-18 RX ADMIN — MAGNESIUM OXIDE TAB 400 MG (241.3 MG ELEMENTAL MG) 400 MG: 400 (241.3 MG) TAB at 08:24

## 2021-07-18 RX ADMIN — KETOROLAC TROMETHAMINE 30 MG: 30 INJECTION, SOLUTION INTRAMUSCULAR; INTRAVENOUS at 21:11

## 2021-07-18 RX ADMIN — CHLORHEXIDINE GLUCONATE 0.12% ORAL RINSE 15 ML: 1.2 LIQUID ORAL at 20:59

## 2021-07-18 RX ADMIN — OXYCODONE HYDROCHLORIDE 5 MG: 5 TABLET ORAL at 07:03

## 2021-07-18 RX ADMIN — ATORVASTATIN CALCIUM 40 MG: 40 TABLET, FILM COATED ORAL at 20:58

## 2021-07-18 RX ADMIN — Medication 1 TABLET: at 08:24

## 2021-07-18 RX ADMIN — MAGNESIUM SULFATE HEPTAHYDRATE 2 G: 40 INJECTION, SOLUTION INTRAVENOUS at 13:15

## 2021-07-18 RX ADMIN — ACETAMINOPHEN 975 MG: 325 TABLET, FILM COATED ORAL at 18:05

## 2021-07-18 RX ADMIN — BUPROPION HYDROCHLORIDE 300 MG: 300 TABLET, EXTENDED RELEASE ORAL at 08:24

## 2021-07-18 RX ADMIN — POTASSIUM CHLORIDE 20 MEQ: 750 TABLET, FILM COATED, EXTENDED RELEASE ORAL at 08:24

## 2021-07-18 RX ADMIN — FUROSEMIDE 40 MG: 40 TABLET ORAL at 16:14

## 2021-07-18 RX ADMIN — SODIUM PHOSPHATE, MONOBASIC, MONOHYDRATE 30 MMOL: 276; 142 INJECTION, SOLUTION INTRAVENOUS at 06:26

## 2021-07-18 RX ADMIN — STANDARDIZED SENNA CONCENTRATE 2 TABLET: 8.6 TABLET ORAL at 20:27

## 2021-07-18 RX ADMIN — STANDARDIZED SENNA CONCENTRATE 2 TABLET: 8.6 TABLET ORAL at 08:24

## 2021-07-18 RX ADMIN — FAMOTIDINE 20 MG: 20 TABLET ORAL at 20:26

## 2021-07-18 RX ADMIN — CLOPIDOGREL BISULFATE 75 MG: 75 TABLET ORAL at 08:24

## 2021-07-18 RX ADMIN — METOPROLOL SUCCINATE 12.5 MG: 25 TABLET, FILM COATED, EXTENDED RELEASE ORAL at 20:57

## 2021-07-18 RX ADMIN — DOCUSATE SODIUM 200 MG: 100 CAPSULE ORAL at 20:26

## 2021-07-18 RX ADMIN — MAGNESIUM OXIDE TAB 400 MG (241.3 MG ELEMENTAL MG) 400 MG: 400 (241.3 MG) TAB at 20:27

## 2021-07-18 RX ADMIN — FUROSEMIDE 40 MG: 40 TABLET ORAL at 08:24

## 2021-07-18 ASSESSMENT — COGNITIVE AND FUNCTIONAL STATUS - GENERAL
CLIMB 3 TO 5 STEPS WITH RAILING: 3 - A LITTLE
WALKING IN HOSPITAL ROOM: 3 - A LITTLE
MOVING TO AND FROM BED TO CHAIR: 3 - A LITTLE
AFFECT: WNL
STANDING UP FROM CHAIR USING ARMS: 3 - A LITTLE

## 2021-07-18 NOTE — HOSPITAL COURSE
Brian is a 61 y.o. female admitted on 7/14/2021 with Non-ST elevation myocardial infarction (NSTEMI) (CMS/HCC) [I21.4]  Coronary artery disease involving native coronary artery of native heart without angina pectoris [I25.10]  CAD (coronary artery disease) [I25.10]  Coronary artery dissection [I25.42]. Principal problem is Coronary artery dissection.    Past Medical History  Brian has a past medical history of Allergies, Anxiety, Asthma, Coronary artery disease, Coronary artery dissection, Lipid disorder, NSTEMI (non-ST elevated myocardial infarction) (CMS/[facility]), and Parvovirus B19 infection.    History of Present Illness   s/p robo cabg

## 2021-07-18 NOTE — PLAN OF CARE
Problem: Adult Inpatient Plan of Care  Goal: Plan of Care Review  Outcome: Progressing  Flowsheets (Taken 7/18/2021 5975)  Progress: improving  Plan of Care Reviewed With: patient  Outcome Summary: PT eval completed, rec home with A     Problem: Mobility Impairment  Goal: Optimal Mobility  Outcome: Progressing

## 2021-07-18 NOTE — PLAN OF CARE
Problem: Adult Inpatient Plan of Care  Goal: Plan of Care Review  Outcome: Progressing  Flowsheets (Taken 7/18/2021 1720)  Progress: improving  Plan of Care Reviewed With:   patient   spouse  Outcome Summary: Patient hemodynamically stable entire shift. Able to titrate levo and NC o2 to off. Walk in brower x4. A-line, eng, and upper CT d/c wihtout incident. Spouse at bedside, all questions answered.  Goal: Patient-Specific Goal (Individualized)  Outcome: Progressing  Goal: Absence of Hospital-Acquired Illness or Injury  Outcome: Progressing  Goal: Optimal Comfort and Wellbeing  Outcome: Progressing  Goal: Readiness for Transition of Care  Outcome: Progressing   Plan of Care Review  Plan of Care Reviewed With: patient, spouse  Progress: improving  Outcome Summary: Patient hemodynamically stable entire shift. Able to titrate levo and NC o2 to off. Walk in brower x4. A-line, eng, and upper CT d/c wihtout incident. Spouse at bedside, all questions answered.

## 2021-07-18 NOTE — PROGRESS NOTES
Cardiothoracic Intensivist Progress Note       CARDIOTHORACIC   Operative Day # 3     Subjective     History of Present Illness: Brian Dean is a 61 y.o. female with history of chest discomfort, found to have severe CAD. Is now s/p OR for CABG. Also with history of chronic HTN, and hyperlipidemia that are being treated.                                Review of Systems:                                     Constitutional: no acute distress                                                  Eyes: denies difficulty with vision  Ears, nose, mouth, throat, and face: denies oral discomfort                                        Respiratory: denies shortness of breath                                  Cardiovascular: mild chest discomfort at incision site                                  Gastrointestinal: denies abdominal pain                                    Genitourinary: denies difficulty voiding                                      Hematologic: denies bleeding issues                                Musculoskeletal: denies muscle pain                                      Neurological: generalized weakness                                   Integumentary: denies rash    Medical History:   Past Medical History:   Diagnosis Date   • Allergies    • Anxiety    • Asthma    • Coronary artery disease    • Coronary artery dissection    • Lipid disorder    • NSTEMI (non-ST elevated myocardial infarction) (CMS/[facility])    • Parvovirus B19 infection        Surgical History:   Past Surgical History:   Procedure Laterality Date   • REDUCTION MAMMAPLASTY         Prior to Admission medications    Medication Sig Start Date End Date Taking? Authorizing Provider   aspirin 81 mg enteric coated tablet Take 81 mg by mouth daily.   Yes Provider, MD Karla   atorvastatin (LIPITOR) 40 mg tablet Take 40 mg by mouth nightly.   Yes ProviderKarla MD   buPROPion XL (WELLBUTRIN XL) 300 mg 24 hr tablet Take 300 mg by mouth daily.   Yes  Karla Whitaker MD   diphenhydrAMINE (BenadryL) 25 mg capsule Take 1 tablet by mouth nightly.     Yes Karla Whitaker MD   DULoxetine (CYMBALTA) 30 mg capsule Take 90 mg by mouth daily.     Yes Karla Whitaker MD   eszopiclone (LUNESTA) 3 mg tablet Take 3 mg by mouth nightly.   Yes Karla Whitaker MD   LORazepam (ATIVAN) 0.5 mg tablet Take 0.5 mg by mouth as needed. 7/6/21  Yes Karla Whitaker MD   montelukast (SINGULAIR) 10 mg tablet Take 1 tablet by mouth nightly.   Yes Karla Whitaker MD   albuterol HFA (VENTOLIN HFA) 90 mcg/actuation inhaler as needed. 1/28/21   Karla Whitaker MD   azelastine (ASTELIN) 137 mcg (0.1 %) nasal spray as needed.    Karla Whitaker MD   olopatadine (PATANOL) 0.1 % ophthalmic solution as needed. 1/28/21   Karla Whitaker MD   triamcinolone (NASACORT) 55 mcg nasal inhaler as needed.    Karla Whitaker MD       Allergies: Sulfa (sulfonamide antibiotics) and Sulfamethoxazole-trimethoprim    Social History:   Social History     Socioeconomic History   • Marital status:      Spouse name: None   • Number of children: None   • Years of education: None   • Highest education level: None   Occupational History   • None   Tobacco Use   • Smoking status: Never Smoker   • Smokeless tobacco: Never Used   Substance and Sexual Activity   • Alcohol use: Never   • Drug use: Never   • Sexual activity: None   Other Topics Concern   • None   Social History Narrative   • None     Social Determinants of Health     Financial Resource Strain:    • Difficulty of Paying Living Expenses:    Food Insecurity:    • Worried About Running Out of Food in the Last Year:    • Ran Out of Food in the Last Year:    Transportation Needs:    • Lack of Transportation (Medical):    • Lack of Transportation (Non-Medical):    Physical Activity:    • Days of Exercise per Week:    • Minutes of Exercise per Session:    Stress:    • Feeling of Stress :    Social  Connections:    • Frequency of Communication with Friends and Family:    • Frequency of Social Gatherings with Friends and Family:    • Attends Shinto Services:    • Active Member of Clubs or Organizations:    • Attends Club or Organization Meetings:    • Marital Status:    Intimate Partner Violence:    • Fear of Current or Ex-Partner:    • Emotionally Abused:    • Physically Abused:    • Sexually Abused:        Family History:   Family History   Problem Relation Age of Onset   • Hypertension Biological Mother    • Heart disease Biological Brother        Objective     Vital signs in last 24 hours:  Temp:  [36.3 °C (97.3 °F)-36.3 °C (97.4 °F)] 36.3 °C (97.4 °F)  Heart Rate:  [66-83] 80  Resp:  [16-18] 16  BP: (113-147)/(54-67) 113/67      Intake/Output Summary (Last 24 hours) at 7/18/2021 1652  Last data filed at 7/18/2021 1600  Gross per 24 hour   Intake 2889.84 ml   Output 2945 ml   Net -55.16 ml     Intake/Output this shift:  I/O this shift:  In: 1535 [P.O.:960; I.V.:175; IV Piggyback:400]  Out: 1705 [Urine:1465; Chest Tube:240]    Labs  Lab Results   Component Value Date    WBC 8.89 07/18/2021    HGB 9.1 (L) 07/18/2021    HCT 27.3 (L) 07/18/2021     07/18/2021    CHOL 150 07/14/2021    TRIG 73 07/14/2021    HDL 77 07/14/2021    ALT 23 07/14/2021    AST 23 07/14/2021     (L) 07/18/2021    K 4.2 07/18/2021     07/18/2021    CREATININE 0.8 07/18/2021    BUN 11 07/18/2021    CO2 25 07/18/2021    MG 1.8 07/18/2021    PHOS 2.1 (L) 07/18/2021    TSH 2.33 07/14/2021    INR 1.2 07/16/2021    HGBA1C 5.5 07/14/2021    PT 14.3 07/16/2021    PTT 29 07/16/2021       Cardiac Imaging    TRANSTHORACIC ECHO (TTE) COMPLETE 07/15/2021    Interpretation Summary  Technically difficult study.    1. Normal-sized left ventricle. Normal left ventricular wall thickness. Preserved left ventricular systolic function. Estimated EF 60% on 3D imaging. No regional wall motion abnormalities. There is impaired relaxation.  Normal global longitudinal strain (-19.1% on Fernandez Epiq).  2. Normal-sized left atrium. (left atrial volume indexed by body surface area 24 ml/m2).  3. Mitral valve leaflet thickening. Mild mitral annular calcification. Trace mitral valve regurgitation.  4. Three-cusped aortic valve. Sclerotic aortic valve leaflets. No aortic valve regurgitation.  5. Aortic root and ascending aorta are normal-sized.  6. Normal-sized right ventricle. Normal right ventricular systolic function. (Right ventricular S' 12 cm/s).  7. Normal-sized right atrium.  8. Tricuspid valve structure is normal. Trace tricuspid valve regurgitation with calculated right ventricular systolic pressure of 26 mmHg.  9. Pulmonic valve not well seen.  10. Inferior vena cava is <2.1 cm and collapses <50% during inspiration. (estimated right atrial pressure is 8 mmHg).  11. There is a trivial pericardial effusion.  12. No prior TTE to compare.        Full Code    Head/Ear/Nose/Throat:     NCAT.                               Eyes:     EOMI.                     Respiratory:     diminished at the bases b/l.               Cardiovascular:     RRR.              Gastrointestinal:     non-tender.                 Genitourinary:     no-deformities.                    Extremities:     no clubbing, cyanosis or edema.            Musculoskeletal:      No injury or deformity.                   Neurological:     no focal deficits.       Behavior/Emotional:     appropriate and cooperative.                           Lymph:      No adenopathy noted.                               Skin:      Clean, dry and intact.     Examination of the patient performed at the bedside. Rounded with ICU team and discussed management/plan with surgical staff. Medications, radiological studies and labs reviewed and discussed with attending of record, Dr. Felix Nix.    Cardiothoracic Assessment and Plan:        61 y.o. female admitted to CTICU.    Critical Care Diagnoses/Needs:  Post-operative Mechanical Ventilatory Support, Arterial Hypotension with  Vasopressor/Inotropic Support/Global Volume Overload/Acute Blood Loss Anemia/Residual hypoxemia     Interval: Arrived in CTICU from OR.    Neurologic:  A&Ox3 and pain moderately controlled. Will continue to optimize multimodal pain regimen  - Sedation: No new neurologic deficit post-op. Comfortable with current regimen.  - Analgesia Plan: Per unit protocol.  Will continue to optimize multimodal pain regimen.     Cardiac: Severe CAD s/p CABGx1 also with CAD and essential hypertension.  Hemodynamically stable, Off vasoactive medication for cardiac and vascular support. Hypotension likely related to combination of hypovolemia and myocardial stunning. Will continue to optimize cardiac medications and monitor MVO2/CO. Appreciate Cardiology input, Continue statin for dyslipidemia, Continue ASA/Plavix  - Echo:  Preserved bi-ventricular function. EF 60%.   - Hemodynamic Goal: MAP>65, SBP< 160 postop.   - Pressor/Inotropic Support: None  - Ongoing Albumin/Crystalloid PRN for hypotension and post-operative hypovolemia.  - Fluid Balance/Diuretics: Globally fluid overloaded with intravascular depletion.    - Diuresis. Goal fluid balance negative.  - Rhythm/Rate control: SR, no tele events.   -  BB while resuscitating        Respiratory: Mild respiratory insufficiency with hypoxia, hypercapnia due to residual sedation, atelectasis, pulmonary congestion. I personally reviewed the CXR, chest tubes visualized. Will encourage IS, mobilization and wean O2 PRN      Renal/Metabolic: Global Volume Overload, Intravascular Depletion.  Continue eng for strict I/O. Making adequate urine output. Will avoid nephrotoxic medications.  - Await creatinine trend.    - Fluid balance/diuresis as above.   Electrolyte repletion per unit protocol.    - Goal K > 4.0, Mg > 2.0.      Gastrointestinal/Nutrition:   - PO as tolerated  - Bowel regimen.  - SUPpx while in  CTICU      Infectious Disease: No current active concern.  - Afebrile, without current evidence of infection. Post-operative reactive leukocytosis  - Complete wai-operative Abx per cardiac surgical pathway.  - Culture data NA      Hematology: No evidence active bleeding. Old blood noted in CT which is re-accumulating. CT surgery aware. DVT ppx per surgeon. Acute (expected) blood loss anemia from surgery   - No indication for transfusion at this time, continue to monitor. Goal HCT > 24. Platelets > 100.   - Anticoagulation/Antithrombotics:   - Timing of initiation of AC to be determined by surgical team when bleeding risk deemed acceptable.      Endocrine: Postoperative hyperglycemia. Follow FSBG.   - Maintain euglycemia. Blood glucose goal 140-180.    - If needed, insulin infusion then SSI for coverage per unit protocol.      ICU Checklist  Lines: will remove superfluous lines PRN   - Chest tube per surgical team.  Prophylaxis: SQH as above, SCD, GI Prophylaxis.  Mobility/PT/OT: Patient maybe be turned/mobilized. OOB/CHAU once extubated. Appreciate PT/OT assistance and evaluation for post-discharge needs.  Skin: Intact today  Dispo: SDU  Goals of Care: Full Code    This patient is s/p cardiac surgery and currently has a high probability of imminent/life-threatening deterioration requiring critical care services. The decision making involved is of high complexity in order to assess, manipulate, and support vital system function in order to prevent potential life threatening deterioration of this patient's condition.    Plan discussed with: Surgical team, RN, RT        Disposition: Doing well overall. Severe CAD s/p major, invasive cardiac surgery. OK for stepdown unit.     I personally spent 35 minutes of critical care time with this patient not including procedure time.        Shade Trimble MD  Anesthesiologist  Intensivist, [facility]          Shade Trimble MD  7/18/2021

## 2021-07-18 NOTE — PROGRESS NOTES
Patient: Brian Dean  [address]  MRN:  [id number]  Today's date:  7/18/2021      Pt left in chair with call bell and personal items within reach. RN informed    Brian is a 61 y.o. female admitted on 7/14/2021 with Non-ST elevation myocardial infarction (NSTEMI) (CMS/[facility]) [I21.4]  Coronary artery disease involving native coronary artery of native heart without angina pectoris [I25.10]  CAD (coronary artery disease) [I25.10]  Coronary artery dissection [I25.42]. Principal problem is Coronary artery dissection.    Past Medical History  Brian has a past medical history of Allergies, Anxiety, Asthma, Coronary artery disease, Coronary artery dissection, Lipid disorder, NSTEMI (non-ST elevated myocardial infarction) (CMS/[facility]), and Parvovirus B19 infection.    History of Present Illness   s/p robo cabg      PT Vitals    Date/Time Pulse SpO2 Pt Activity O2 Therapy BP BP Location BP Method Pt Position [facility]   07/18/21 1042 77 99 % At rest None (Room air) 137/65 Left upper arm Automatic Sitting MY   07/18/21 1053 83 98 % Other (Comment) None (Room air) 147/65 Left upper arm Automatic Sitting MY      PT Pain    Date/Time Pain Type Rating: Rest Rating: Activity [facility]   07/18/21 1042 Pain Assessment 0 - no pain 0 - no pain MY          Prior Living Environment      Most Recent Value   Living Environment Comment  lives with  nd son, 2SH, half bath 1st fl, 1STE        Prior Level of Function      Most Recent Value   Ambulation  independent   Transferring  independent   Toileting  independent   Bathing  independent   Dressing  independent   Eating  independent   Communication  understands/communicates without difficulty   Swallowing  swallows foods/liquids without difficulty   Assistive Device Currently Used at Home  none          PT Evaluation and Treatment - 07/18/21 1040        PT Time Calculation    Start Time  1040     Stop Time  1056     Time Calculation (min)  16 min         Session Details    Document Type  initial evaluation     Mode of Treatment  physical therapy        General Information    Patient Profile Reviewed  yes     Existing Precautions/Restrictions  cardiac        Cognition/Psychosocial    Affect/Mental Status (Cognition)  WNL     Orientation Status (Cognition)  oriented x 3     Follows Commands (Cognition)  WNL        Sensory Assessment (Somatosensory)    Sensory Assessment (Somatosensory)  sensation intact;bilateral LE        Range of Motion (ROM)    Range of Motion  ROM is WNL;bilateral lower extremities        Strength (Manual Muscle Testing)    Strength (Manual Muscle Testing)  strength is WNL;bilateral lower extremities        Bed Mobility    Comment (Bed Mobility)  pt recieved OOB        Sit to Stand Transfer    [place], Sit to Stand Transfer  minimum assist (75% or more patient effort)     Verbal Cues  hand placement;safety;technique        Stand to Sit Transfer    [place], Stand to Sit Transfer  minimum assist (75% or more patient effort);1 person assist     Verbal Cues  hand placement;safety;technique        Gait Training    [place], Gait  close supervision     Assistive Device  none     Distance in Feet  175 feet     Pattern (Gait)  step-through     Deviations/Abnormal Patterns (Gait)  leon decreased;gait speed decreased;step length decreased        Stairs Training    [place], Stairs  not tested;safety considerations        Balance    Static Sitting Balance  WFL;sitting in chair;unsupported     Dynamic Sitting Balance  WFL;sitting, edge of bed;unsupported     Static Standing Balance  WFL;unsupported;standing     Dynamic Standing Balance  mild impairment;unsupported;standing        AM-PAC (TM) - Mobility (Current Function)    Turning from your back to your side while in a flat bed without using bedrails?  3 - A Little     Moving from lying on your back to sitting on the side of a flat bed without using bedrails?  3 - A Little      Moving to and from a bed to a chair?  3 - A Little     Standing up from a chair using your arms?  3 - A Little     To walk in a hospital room?  3 - A Little     Climbing 3-5 steps with a railing?  3 - A Little     AM-PAC (TM) Mobility Score  18                       Education Documentation  Home Safety, taught by Rachel Velazquez, PT at 7/18/2021  3:38 PM.  Learner: Patient  Readiness: Acceptance  Method: Explanation  Response: Verbalizes Understanding  Comment: amb with S          PT Assessment/Plan      Most Recent Value   Daily Outcome Statement  pt presents with min functional deficits, limited by decreased balance and endurance. pt appropriate to return home with A when med stable. at 07/18/2021 1040   PT Recommended Discharge Disposition  home with assistance at 07/18/2021 1040   Rehab Potential  good, to achieve stated therapy goals at 07/18/2021 1040   Therapy Frequency  5-7 times/wk at 07/18/2021 1040   Planned Therapy Interventions  balance training, bed mobility training, gait training, transfer training, strengthening, stair training at 07/18/2021 1040   Anticipated Equipment Needs at Discharge (PT)  none at 07/18/2021 1040   Patient/Family Therapy Goals Statement  return home at 07/18/2021 1040          PT Goals      Most Recent Value   Bed Mobility Goal 1   Activity/Assistive Device  bed mobility activities, all at 07/18/2021 1040   [place]  modified independence at 07/18/2021 1040   Time Frame  by discharge at 07/18/2021 1040   Progress/Outcome  goal ongoing at 07/18/2021 1040   Transfer Goal 1   Activity/Assistive Device  all transfers at 07/18/2021 1040   [place]  modified independence at 07/18/2021 1040   Time Frame  by discharge at 07/18/2021 1040   Progress/Outcome  goal ongoing at 07/18/2021 1040   Gait Training Goal 1   Activity/Assistive Device  gait (walking locomotion) at 07/18/2021 1040   [place]  modified independence at 07/18/2021 1040   Distance  250 at 07/18/2021 1040   Time  Frame  by discharge at 07/18/2021 1040   Progress/Outcome  goal ongoing at 07/18/2021 1040   Stairs Goal 1   Activity/Assistive Device  stairs, all skills at 07/18/2021 1040   [place]  modified independence at 07/18/2021 1040   Number of Stairs  12 at 07/18/2021 1040   Time Frame  by discharge at 07/18/2021 1040   Progress/Outcome  goal ongoing at 07/18/2021 1040

## 2021-07-19 ENCOUNTER — APPOINTMENT (INPATIENT)
Dept: RADIOLOGY | Facility: HOSPITAL | Age: 61
DRG: 233 | End: 2021-07-19
Attending: NURSE PRACTITIONER
Payer: COMMERCIAL

## 2021-07-19 PROBLEM — Z95.1 S/P CABG X 1: Status: ACTIVE | Noted: 2021-07-09

## 2021-07-19 LAB
ANION GAP SERPL CALC-SCNC: 8 MEQ/L (ref 3–15)
APTT PPP: 33 SEC (ref 23–35)
ATRIAL RATE: 83
BUN SERPL-MCNC: 11 MG/DL (ref 8–20)
CALCIUM SERPL-MCNC: 8.8 MG/DL (ref 8.9–10.3)
CHLORIDE SERPL-SCNC: 103 MEQ/L (ref 98–109)
CO2 SERPL-SCNC: 27 MEQ/L (ref 22–32)
CREAT SERPL-MCNC: 0.9 MG/DL (ref 0.6–1.1)
CROSSMATCH: NORMAL
ERYTHROCYTE [DISTWIDTH] IN BLOOD BY AUTOMATED COUNT: 13.7 % (ref 11.7–14.4)
ERYTHROCYTE [DISTWIDTH] IN BLOOD BY AUTOMATED COUNT: 14 % (ref 11.7–14.4)
FIBRINOGEN PPP-MCNC: 670 MG/DL (ref 220–480)
GFR SERPL CREATININE-BSD FRML MDRD: >60 ML/MIN/1.73M*2
GLUCOSE SERPL-MCNC: 91 MG/DL (ref 70–99)
HCT VFR BLDCO AUTO: 26.2 % (ref 35–45)
HCT VFR BLDCO AUTO: 30.6 % (ref 35–45)
HCT VFR BLDCO AUTO: 30.6 % (ref 35–45)
HGB BLD-MCNC: 10 G/DL (ref 11.8–15.7)
HGB BLD-MCNC: 10 G/DL (ref 11.8–15.7)
HGB BLD-MCNC: 8.6 G/DL (ref 11.8–15.7)
INR PPP: 0.9
ISBT CODE: 5100
MAGNESIUM SERPL-MCNC: 2 MG/DL (ref 1.8–2.5)
MAGNESIUM SERPL-MCNC: 2.1 MG/DL (ref 1.8–2.5)
MCH RBC QN AUTO: 29.9 PG (ref 28–33.2)
MCH RBC QN AUTO: 30 PG (ref 28–33.2)
MCHC RBC AUTO-ENTMCNC: 32.7 G/DL (ref 32.2–35.5)
MCHC RBC AUTO-ENTMCNC: 32.8 G/DL (ref 32.2–35.5)
MCV RBC AUTO: 91.3 FL (ref 83–98)
MCV RBC AUTO: 91.3 FL (ref 83–98)
P AXIS: 82
PDW BLD AUTO: 11.3 FL (ref 9.4–12.3)
PDW BLD AUTO: 11.7 FL (ref 9.4–12.3)
PLATELET # BLD AUTO: 168 K/UL (ref 150–369)
PLATELET # BLD AUTO: 236 K/UL (ref 150–369)
POTASSIUM SERPL-SCNC: 4.5 MEQ/L (ref 3.6–5.1)
POTASSIUM SERPL-SCNC: 4.9 MEQ/L (ref 3.6–5.1)
PR INTERVAL: 116
PRODUCT CODE: NORMAL
PRODUCT STATUS: NORMAL
PROTHROMBIN TIME: 12.2 SEC (ref 12.2–14.5)
QRS DURATION: 74
QT INTERVAL: 368
QTC CALCULATION(BAZETT): 432
R AXIS: 88
RBC # BLD AUTO: 2.87 M/UL (ref 3.93–5.22)
RBC # BLD AUTO: 3.35 M/UL (ref 3.93–5.22)
SODIUM SERPL-SCNC: 138 MEQ/L (ref 136–144)
SPECIMEN EXP DATE BLD: NORMAL
T WAVE AXIS: 82
UNIT ABO: NORMAL
UNIT ID: NORMAL
UNIT RH: POSITIVE
VENTRICULAR RATE: 83
WBC # BLD AUTO: 9.18 K/UL (ref 3.8–10.5)
WBC # BLD AUTO: 9.37 K/UL (ref 3.8–10.5)

## 2021-07-19 PROCEDURE — 99233 SBSQ HOSP IP/OBS HIGH 50: CPT | Performed by: INTERNAL MEDICINE

## 2021-07-19 PROCEDURE — 63700000 HC SELF-ADMINISTRABLE DRUG: Performed by: PHYSICIAN ASSISTANT

## 2021-07-19 PROCEDURE — 93005 ELECTROCARDIOGRAM TRACING: CPT | Performed by: INTERNAL MEDICINE

## 2021-07-19 PROCEDURE — 85014 HEMATOCRIT: CPT | Performed by: THORACIC SURGERY (CARDIOTHORACIC VASCULAR SURGERY)

## 2021-07-19 PROCEDURE — 93010 ELECTROCARDIOGRAM REPORT: CPT | Performed by: INTERNAL MEDICINE

## 2021-07-19 PROCEDURE — 63600000 HC DRUGS/DETAIL CODE: Performed by: NURSE PRACTITIONER

## 2021-07-19 PROCEDURE — 85027 COMPLETE CBC AUTOMATED: CPT | Performed by: THORACIC SURGERY (CARDIOTHORACIC VASCULAR SURGERY)

## 2021-07-19 PROCEDURE — 85730 THROMBOPLASTIN TIME PARTIAL: CPT | Performed by: THORACIC SURGERY (CARDIOTHORACIC VASCULAR SURGERY)

## 2021-07-19 PROCEDURE — 84132 ASSAY OF SERUM POTASSIUM: CPT | Performed by: THORACIC SURGERY (CARDIOTHORACIC VASCULAR SURGERY)

## 2021-07-19 PROCEDURE — 97116 GAIT TRAINING THERAPY: CPT | Mod: GP,CQ

## 2021-07-19 PROCEDURE — 63600000 HC DRUGS/DETAIL CODE: Performed by: PHYSICIAN ASSISTANT

## 2021-07-19 PROCEDURE — 71045 X-RAY EXAM CHEST 1 VIEW: CPT

## 2021-07-19 PROCEDURE — 85384 FIBRINOGEN ACTIVITY: CPT | Performed by: THORACIC SURGERY (CARDIOTHORACIC VASCULAR SURGERY)

## 2021-07-19 PROCEDURE — 21400000 HC ROOM AND CARE CCU/INTERMEDIATE

## 2021-07-19 PROCEDURE — 63700000 HC SELF-ADMINISTRABLE DRUG: Performed by: NURSE PRACTITIONER

## 2021-07-19 PROCEDURE — 83735 ASSAY OF MAGNESIUM: CPT | Performed by: THORACIC SURGERY (CARDIOTHORACIC VASCULAR SURGERY)

## 2021-07-19 PROCEDURE — 36415 COLL VENOUS BLD VENIPUNCTURE: CPT | Performed by: THORACIC SURGERY (CARDIOTHORACIC VASCULAR SURGERY)

## 2021-07-19 PROCEDURE — 99232 SBSQ HOSP IP/OBS MODERATE 35: CPT | Performed by: INTERNAL MEDICINE

## 2021-07-19 PROCEDURE — 99233 SBSQ HOSP IP/OBS HIGH 50: CPT | Performed by: STUDENT IN AN ORGANIZED HEALTH CARE EDUCATION/TRAINING PROGRAM

## 2021-07-19 PROCEDURE — 85610 PROTHROMBIN TIME: CPT | Performed by: THORACIC SURGERY (CARDIOTHORACIC VASCULAR SURGERY)

## 2021-07-19 PROCEDURE — 82310 ASSAY OF CALCIUM: CPT | Performed by: THORACIC SURGERY (CARDIOTHORACIC VASCULAR SURGERY)

## 2021-07-19 PROCEDURE — 85027 COMPLETE CBC AUTOMATED: CPT | Performed by: NURSE PRACTITIONER

## 2021-07-19 RX ORDER — MELATONIN 5 MG
5 CAPSULE ORAL NIGHTLY
Status: DISCONTINUED | OUTPATIENT
Start: 2021-07-19 | End: 2021-07-20 | Stop reason: HOSPADM

## 2021-07-19 RX ORDER — ENOXAPARIN SODIUM 100 MG/ML
40 INJECTION SUBCUTANEOUS
Status: DISCONTINUED | OUTPATIENT
Start: 2021-07-19 | End: 2021-07-20 | Stop reason: HOSPADM

## 2021-07-19 RX ADMIN — Medication 81 MG: at 09:02

## 2021-07-19 RX ADMIN — MUPIROCIN 1 APPLICATION.: 20 OINTMENT TOPICAL at 09:03

## 2021-07-19 RX ADMIN — FAMOTIDINE 20 MG: 20 TABLET ORAL at 09:02

## 2021-07-19 RX ADMIN — BUPROPION HYDROCHLORIDE 300 MG: 300 TABLET, EXTENDED RELEASE ORAL at 09:02

## 2021-07-19 RX ADMIN — OXYCODONE HYDROCHLORIDE 5 MG: 5 TABLET ORAL at 02:38

## 2021-07-19 RX ADMIN — STANDARDIZED SENNA CONCENTRATE 2 TABLET: 8.6 TABLET ORAL at 09:02

## 2021-07-19 RX ADMIN — POTASSIUM CHLORIDE 20 MEQ: 750 TABLET, FILM COATED, EXTENDED RELEASE ORAL at 09:03

## 2021-07-19 RX ADMIN — ACETAMINOPHEN 975 MG: 325 TABLET, FILM COATED ORAL at 12:16

## 2021-07-19 RX ADMIN — STANDARDIZED SENNA CONCENTRATE 2 TABLET: 8.6 TABLET ORAL at 19:40

## 2021-07-19 RX ADMIN — DOCUSATE SODIUM 200 MG: 100 CAPSULE ORAL at 19:40

## 2021-07-19 RX ADMIN — FAMOTIDINE 20 MG: 20 TABLET ORAL at 19:41

## 2021-07-19 RX ADMIN — DULOXETINE 90 MG: 60 CAPSULE, DELAYED RELEASE ORAL at 09:02

## 2021-07-19 RX ADMIN — KETOROLAC TROMETHAMINE 30 MG: 30 INJECTION, SOLUTION INTRAMUSCULAR; INTRAVENOUS at 02:53

## 2021-07-19 RX ADMIN — ACETAMINOPHEN 975 MG: 325 TABLET, FILM COATED ORAL at 17:14

## 2021-07-19 RX ADMIN — GABAPENTIN 300 MG: 300 CAPSULE ORAL at 09:02

## 2021-07-19 RX ADMIN — MAGNESIUM OXIDE TAB 400 MG (241.3 MG ELEMENTAL MG) 400 MG: 400 (241.3 MG) TAB at 09:02

## 2021-07-19 RX ADMIN — METOPROLOL SUCCINATE 12.5 MG: 25 TABLET, EXTENDED RELEASE ORAL at 22:35

## 2021-07-19 RX ADMIN — CLOPIDOGREL BISULFATE 75 MG: 75 TABLET ORAL at 09:02

## 2021-07-19 RX ADMIN — ATORVASTATIN CALCIUM 40 MG: 40 TABLET, FILM COATED ORAL at 22:06

## 2021-07-19 RX ADMIN — GABAPENTIN 300 MG: 300 CAPSULE ORAL at 19:41

## 2021-07-19 RX ADMIN — POTASSIUM CHLORIDE 20 MEQ: 750 TABLET, FILM COATED, EXTENDED RELEASE ORAL at 19:41

## 2021-07-19 RX ADMIN — Medication 1 TABLET: at 09:02

## 2021-07-19 RX ADMIN — OXYCODONE HYDROCHLORIDE 5 MG: 5 TABLET ORAL at 17:14

## 2021-07-19 RX ADMIN — Medication 5 MG: at 22:34

## 2021-07-19 RX ADMIN — COLCHICINE 0.6 MG: 0.6 TABLET, FILM COATED ORAL at 09:02

## 2021-07-19 RX ADMIN — DOCUSATE SODIUM 200 MG: 100 CAPSULE ORAL at 09:02

## 2021-07-19 RX ADMIN — OXYCODONE HYDROCHLORIDE 5 MG: 5 TABLET ORAL at 12:16

## 2021-07-19 RX ADMIN — ENOXAPARIN SODIUM 40 MG: 40 INJECTION SUBCUTANEOUS at 17:14

## 2021-07-19 RX ADMIN — MAGNESIUM OXIDE TAB 400 MG (241.3 MG ELEMENTAL MG) 400 MG: 400 (241.3 MG) TAB at 19:40

## 2021-07-19 RX ADMIN — MONTELUKAST 10 MG: 10 TABLET, FILM COATED ORAL at 22:06

## 2021-07-19 RX ADMIN — FUROSEMIDE 40 MG: 40 TABLET ORAL at 09:03

## 2021-07-19 ASSESSMENT — COGNITIVE AND FUNCTIONAL STATUS - GENERAL
WALKING IN HOSPITAL ROOM: 4 - NONE
AFFECT: WNL
CLIMB 3 TO 5 STEPS WITH RAILING: 4 - NONE
STANDING UP FROM CHAIR USING ARMS: 4 - NONE
MOVING TO AND FROM BED TO CHAIR: 4 - NONE

## 2021-07-19 NOTE — PLAN OF CARE
Plan of Care Review  Outcome Summary: Patient is ambulating independently. Pain is controlled with PO pain medication. VSS.

## 2021-07-19 NOTE — ASSESSMENT & PLAN NOTE
Robotic LIMA to LAD on 7/16  Soreness at chest tube site  Otherwise feels well   ECG on 7/20 shows sinus, age indeterminate anteroseptal and possibly high lateral infarction- also present preop  Continue ASA, clopidogrel, colchicine, atorvastatin and metoprolol with parameters

## 2021-07-19 NOTE — PATIENT CARE CONFERENCE
Care Progression Rounds Note  Date: 7/19/2021  Time: 9:38 AM     Patient Name: Brian Dean     Medical Record Number: [id number]   YOB: 1960  Sex: Female      Room/Bed: 2030    Admitting Diagnosis: Non-ST elevation myocardial infarction (NSTEMI) (CMS/[facility]) [I21.4]  Coronary artery disease involving native coronary artery of native heart without angina pectoris [I25.10]  CAD (coronary artery disease) [I25.10]  Coronary artery dissection [I25.42]   Admit Date/Time: 7/14/2021  7:34 AM    Primary Diagnosis: Coronary artery dissection  Principal Problem: Coronary artery dissection    GMLOS: pending  Anticipated Discharge Date: 7/22/2021    AM-PAC:  Mobility Score: 18    Discharge Planning:  Anticipated Discharge Disposition: home with assistance    Barriers to Discharge:  Barriers to Discharge: Medical issues not resolved  Comment: Transfer to [facility] today    Participants:  social work/services, nursing, physical therapy,

## 2021-07-19 NOTE — PROGRESS NOTES
Cardiology Daily Progress Note    Subjective/Objective:  Out of bed in the chair.  Feels stronger.  Is hopeful to be able to go home tomorrow    Review of systems: No headaches, no visual disturbances, the patient has discomfort on deep inspiration.  Appetite is fair.  All other systems reviewed and were noncontributory    Physical exam: Middle-aged female sitting in a chair in no acute distress  Vital signs stable  Skin: Warm dry  HEENT: Normocephalic atraumatic  Neck: No bruits or JVD  Lungs decreased breath sounds at the left base  Cardiac regular rate tones are of fair quality  Abdomen soft bowel sounds present  Extremities no marked edema no palpable cords  Neuro no focal motor or sensory deficits    Laboratory data: All pertinent lab studies were personally reviewed    Assessment/Plan   1.  Postoperative day 3.  Status post robotic CABG with LIMA to the LAD for proximal LAD stenosis which likely represents intramural hematoma/possible SCAD.  Patient continues to do well.  Would continue her present medical regimen as well as ambulation.  Hopefully the patient will be ready for discharge tomorrow.    2.  Dyslipidemia.  Would continue atorvastatin although I am not sure the patient truly does have atherosclerotic coronary artery disease.    3.  Decreased breath sounds at the left base.  I am confident this will improve.         Expected Discharge Date:  7/20/2021

## 2021-07-19 NOTE — CONSULTS
Physical Medicine and Rehabilitation Consult Note    Subjective     Brian Dean is a very pleasant 61 y.o. female with a PMHx significant for coronary artery disease with history of myocardial infarction, hyperlipidemia, Parvovirus B19 infection, and asthma who was admitted 7/14/2021 for robotic CABG. The procedure was well-tolerated.  Postoperative course was complicated by a small pneumothorax.    The patient's chief complaint today is ongoing chest pain secondary to chest tube in situ to address pneumothorax.  She otherwise is feeling overall well.  She denies any dizziness, lightheadedness, numbness, fever, chills, shakes, weakness.  She is been up and walking and denies any difficulty with ambulation.  She is looking forward to getting discharged home.    Past Medical History:   Diagnosis Date   • Allergies    • Anxiety    • Asthma    • Coronary artery disease    • Coronary artery dissection    • Lipid disorder    • NSTEMI (non-ST elevated myocardial infarction) (CMS/[facility])    • Parvovirus B19 infection        Past Surgical History:   Procedure Laterality Date   • REDUCTION MAMMAPLASTY         Allergies: Sulfa (sulfonamide antibiotics) and Sulfamethoxazole-trimethoprim    Lives with:    and son in a two-story home with a half bath on the first floor one-step to enter  Prior Level of Function  Ambulation: independent  Transferring: independent  Toileting: independent  Bathing: independent  Dressing: independent  Eating: independent  Communication: understands/communicates without difficulty  Swallowing: swallows foods/liquids without difficulty    History also provided by chart review    Family History:   Family History   Problem Relation Age of Onset   • Hypertension Biological Mother    • Heart disease Biological Brother         Meds:    • acetaminophen  975 mg oral q6h YOON   • aspirin  81 mg oral Daily   • atorvastatin  40 mg oral Nightly   • buPROPion XL  300 mg oral Daily   • clopidogreL  75 mg  "oral Daily   • colchicine  0.6 mg oral Daily   • docusate sodium  200 mg oral BID   • DULoxetine  90 mg oral Daily   • enoxaparin  40 mg subcutaneous Daily (6p)   • famotidine  20 mg intravenous BID    Or   • famotidine  20 mg oral BID   • furosemide  40 mg oral BID (am, 4p)    Followed by   • [START ON 7/20/2021] furosemide  40 mg oral Daily   • gabapentin  300 mg oral BID   • magnesium oxide  400 mg oral BID   • metoprolol succinate XL  25 mg oral Nightly    Or   • metoprolol succinate XL  50 mg oral Nightly   • montelukast  10 mg oral Nightly   • multivitamin  1 tablet oral Daily   • mupirocin  1 application Each Nostril BID   • potassium chloride  20 mEq oral BID   • senna  2 tablet oral BID       Review of Systems  All system review negative except as per HPI    Objective     Visit Vitals  BP (!) 144/64 (BP Location: Left upper arm, Patient Position: Lying)   Pulse 90   Temp 37.1 °C (98.7 °F) (Oral)   Resp 18   Ht 1.676 m (5' 5.98\")   Wt 60 kg (132 lb 4.4 oz)   SpO2 97%   BMI 21.36 kg/m²     Physical Exam    Lab Results   Component Value Date    WBC 9.18 07/19/2021    HGB 8.6 (L) 07/19/2021    HCT 26.2 (L) 07/19/2021     07/19/2021    CHOL 150 07/14/2021    TRIG 73 07/14/2021    HDL 77 07/14/2021    ALT 23 07/14/2021    AST 23 07/14/2021     07/19/2021    K 4.5 07/19/2021     07/19/2021    CREATININE 0.9 07/19/2021    BUN 11 07/19/2021    CO2 27 07/19/2021    TSH 2.33 07/14/2021    INR 1.2 07/16/2021    HGBA1C 5.5 07/14/2021       Labs:   I reviewed the patient's labs from 07/19/2021.  Results significant for hemoglobin of 8.6, hematocrit 26.2.  Compared to prior results hemoglobin hematocrit are stable.    Imaging:  I reviewed the chest x-ray from 7/19/2021.  There is a left apical chest tube in place.  There is a small left pleural effusion improved from prior examination.  The previously noted trace left-sided pneumothorax is grossly unchanged compared to prior.    Cardiac:  I reviewed the " cardiac catheterization from 07/14/2021.  There is 70% mid LAD focal stenosis just distal to a large diagonal vessel.  Lack of other angiographic disease suggest FMD or intramural hematoma as etiology as opposed to atherosclerotic disease.    Assessment   1. LAD stenosis status post robotic CABG:  Ongoing medical optimization as per cardiology and primary team.  2. Chest pain: Secondary to chest tube in situ, anticipate rapid improvement with removal of chest tube. Ongoing analgesia as per primary team.  3. Anxiety: Duloxetine and bupropion are both associated with increased blood pressure.  Continue to monitor blood pressure closely, currently ranging  systolic over the past 24 hours.  Patient may need follow-up with psychiatrist as outpatient to consider transitioning to alternative medications if possible.  Would not adjust home psychiatric medications.  4. Dispo: Patient is most appropriate for discharge to home         Plan of care was discussed with primary team    Alyssa Wright MD - pager [phone]  [facility]  7/19/2021 12:30 PM

## 2021-07-19 NOTE — PROGRESS NOTES
Cardiology Progress Note      HPI     Soreness at chest tube site  Otherwise feels well   Hungry  No BM yet  Denies nausea or dyspnea   Slept OK last night after pain meds       Current Facility-Administered Medications   Medication Dose Route Frequency Provider Last Rate Last Admin   • acetaminophen (TYLENOL) tablet 975 mg  975 mg oral q6h YOON Compa Goss PA C   975 mg at 07/18/21 1805   • albumin human 5 % solution 500 mL  500 mL intravenous PRN Compa Goss PA C   250 mL at 07/17/21 0606   • alum-mag hydroxide-simeth (MAALOX) 200-200-20 mg/5 mL suspension 30 mL  30 mL oral q4h PRN Compa Goss PA C       • aspirin enteric coated tablet 81 mg  81 mg oral Daily Compa Goss PA C   81 mg at 07/18/21 0824   • atorvastatin (LIPITOR) tablet 40 mg  40 mg oral Nightly Compa Goss PA C   40 mg at 07/18/21 2058   • atropine injection 0.5 mg  0.5 mg intravenous q5 min PRN Compa Goss PA C       • bisacodyL (DULCOLAX) 10 mg suppository 10 mg  10 mg rectal PRN Compa Goss PA C       • buPROPion XL (WELLBUTRIN XL) 24 hr ER tablet 300 mg  300 mg oral Daily Compa Goss PA C   300 mg at 07/18/21 0824   • calcium chloride 1 g in sodium chloride 0.9 % 50 mL IVPB  1 g intravenous q6h PRN Compa Goss PA C   Stopped at 07/17/21 0342   • calcium chloride 100 mg/mL (10 %) injection 0.5 g  0.5 g intravenous Once PRN Compa Goss PA C       • clopidogreL (PLAVIX) tablet 75 mg  75 mg oral Daily Compa Goss PA C   75 mg at 07/18/21 0824   • colchicine (COLCRYS) tablet 0.6 mg  0.6 mg oral Daily Compa Goss PA C   0.6 mg at 07/18/21 0824   • diphenhydrAMINE (BENADRYL) capsule 25 mg  25 mg oral q6h PRN Compa Goss PA C        Or   • diphenhydrAMINE (BENADRYL) injection 25 mg  25 mg intravenous q6h PRN Compa Goss PA C       • docusate sodium (COLACE) capsule 200 mg  200 mg oral BID Compa Goss PA C   200 mg at 07/18/21 2026   •  DULoxetine (CYMBALTA) capsule,delayed release(DR/EC) 90 mg  90 mg oral Daily Compa Goss PA C   90 mg at 07/18/21 0824   • famotidine (PEPCID) injection 20 mg  20 mg intravenous BID Compa Goss PA C   20 mg at 07/16/21 2007    Or   • famotidine (PEPCID) tablet 20 mg  20 mg oral BID Compa Goss PA C   20 mg at 07/18/21 2026   • furosemide (LASIX) tablet 40 mg  40 mg oral BID (am, 4p) Compa Goss PA C   40 mg at 07/18/21 1614    Followed by   • [START ON 7/20/2021] furosemide (LASIX) tablet 40 mg  40 mg oral Daily Compa Goss PA C       • gabapentin (NEURONTIN) capsule 300 mg  300 mg oral BID Compa Goss PA C   300 mg at 07/18/21 2026   • ketorolac (TORADOL) injection 30 mg  30 mg intravenous q6h PRN Compa Goss PA C   30 mg at 07/19/21 0253   • LORazepam (ATIVAN) tablet 0.5 mg  0.5 mg oral q6h PRN Compa Goss PA C   0.5 mg at 07/18/21 0326   • magnesium oxide (MAG-OX) tablet 400 mg  400 mg oral BID Compa Goss PA C   400 mg at 07/18/21 2027   • magnesium sulfate IVPB 2g in 50 mL NSS/D5W/SWFI  2 g intravenous PRN Compa Goss PA C   Stopped at 07/18/21 1424   • metoprolol succinate XL (TOPROL-XL) 24 hr ER tablet 25 mg  25 mg oral Nightly Compa Goss PA C        Or   • metoprolol succinate XL (TOPROL-XL) 24 hr ER tablet 50 mg  50 mg oral Nightly Compa Goss PA C       • montelukast (SINGULAIR) tablet 10 mg  10 mg oral Nightly Compa Goss PA C   10 mg at 07/18/21 2057   • multivitamin tablet 1 tablet  1 tablet oral Daily Compa Goss PA C   1 tablet at 07/18/21 0824   • mupirocin (BACTROBAN) 2 % ointment 1 application  1 application Each Nostril BID Compa Goss PA C   1 application at 07/18/21 2027   • ondansetron ODT (ZOFRAN-ODT) disintegrating tablet 4 mg  4 mg oral q8h PRN Compa oGss PA C        Or   • ondansetron (ZOFRAN) injection 4 mg  4 mg intravenous q8h PRN Compa Goss PA C        • oxyCODONE (ROXICODONE) immediate release tablet 5 mg  5 mg oral q4h PRN Compa Goss PA C   5 mg at 07/19/21 0238   • potassium chloride (KLOR-CON) tablet extended release 20 mEq  20 mEq oral BID Compa Goss PA C   20 mEq at 07/18/21 2027   • potassium chloride 20 mEq in 100 mL IVPB  (premix)  20 mEq intravenous q8h PRN Compa Goss PA C   Stopped at 07/18/21 1527   • senna (SENOKOT) tablet 2 tablet  2 tablet oral BID Compa Goss PA C   2 tablet at 07/18/21 2027   • sodium bicarbonate 8.4 % (1 mEq/mL) injection  mEq   mEq intravenous PRN Compa Goss PA C   50 mEq at 07/16/21 2134         Objective       Vitals:    07/19/21 0700   BP: (!) 109/52   Pulse: 75   Resp:    Temp:    SpO2:        Physical Exam  Constitutional:       Appearance: She is well-developed.   HENT:      Head: Normocephalic.   Eyes:      General:         Right eye: No discharge.         Left eye: No discharge.   Neck:      Vascular: No JVD.   Cardiovascular:      Rate and Rhythm: Normal rate and regular rhythm.      Heart sounds: Normal heart sounds.   Pulmonary:      Breath sounds: Examination of the left-lower field reveals decreased breath sounds. Decreased breath sounds present.   Chest:      Comments: Left chest tube still in place   Abdominal:      General: Bowel sounds are normal.      Palpations: Abdomen is soft.   Musculoskeletal:         General: No deformity.   Skin:     Findings: No rash.   Neurological:      Mental Status: She is alert and oriented to person, place, and time.   Psychiatric:         Behavior: Behavior normal.          Labs   Lab Results   Component Value Date    WBC 9.18 07/19/2021    HGB 8.6 (L) 07/19/2021    HCT 26.2 (L) 07/19/2021     07/19/2021    CHOL 150 07/14/2021    TRIG 73 07/14/2021    HDL 77 07/14/2021    ALT 23 07/14/2021    AST 23 07/14/2021     07/19/2021    K 4.5 07/19/2021     07/19/2021    CREATININE 0.9 07/19/2021    BUN 11  07/19/2021    CO2 27 07/19/2021    TSH 2.33 07/14/2021    HGBA1C 5.5 07/14/2021       Imaging  CXR- left pleural effusion with volume loss     Telemetry: sinus    S/P CABG x 1  Assessment & Plan  Robotic LIMA to LAD on 7/16  Soreness at chest tube site  Otherwise feels well   Continue ASA, clopidogrel, colchicine, atorvastatin and metoprolol with parameters   Would repeat ECG     * Coronary artery dissection  Assessment & Plan  Concern for FMD- will get CTA and MRA of head and neck, abdomen and pelvis later     Dyslipidemia  Assessment & Plan  Continue atorvastatin         Eleazar Hall MD  7/19/2021

## 2021-07-19 NOTE — PROGRESS NOTES
Patient: Brian Dean  Location:  [facility] 2009  MRN:  [id number]  Today's date:  7/19/2021    Pt left standing in room, Nurse present, NAD noted.    Note to be sent to supervising PT 2* pt achieving all stated goals. No further PT needs required.    Brian is a 61 y.o. female admitted on 7/14/2021 with Non-ST elevation myocardial infarction (NSTEMI) (CMS/[facility]) [I21.4]  Coronary artery disease involving native coronary artery of native heart without angina pectoris [I25.10]  CAD (coronary artery disease) [I25.10]  Coronary artery dissection [I25.42]. Principal problem is Coronary artery dissection.    Past Medical History  Brian has a past medical history of Allergies, Anxiety, Asthma, Coronary artery disease, Coronary artery dissection, Lipid disorder, NSTEMI (non-ST elevated myocardial infarction) (CMS/[facility]), and Parvovirus B19 infection.    History of Present Illness   s/p robo cabg      PT Vitals    Date/Time Pulse HR Source SpO2 Pt Activity O2 Therapy BP BP Location BP Method Pt Position [facility]   07/19/21 1206 90 Monitor 97 % At rest None (Room air) 144/64 Left upper arm Automatic Lying YZG      PT Pain    Date/Time Pain Type Side/Orientation Location Rating: Rest Rating: Activity Description Interventions [facility]   07/19/21 1206 Pain Assessment bilateral chest 8 8 intermittent medication administered;position adjusted YZG          Prior Living Environment      Most Recent Value   Living Environment Comment  lives with  nd son, 2SH, half bath 1st fl, 1STE        Prior Level of Function      Most Recent Value   Ambulation  independent   Transferring  independent   Toileting  independent   Bathing  independent   Dressing  independent   Eating  independent   Communication  understands/communicates without difficulty   Swallowing  swallows foods/liquids without difficulty   Assistive Device Currently Used at Home  none          PT Evaluation and Treatment - 07/19/21 1206        PT  Time Calculation    Start Time  1206     Stop Time  1216     Time Calculation (min)  10 min        Session Details    Document Type  daily treatment/progress note     Mode of Treatment  physical therapy;individual therapy        General Information    Patient Profile Reviewed  yes     Onset of Illness/Injury or Date of Surgery  07/14/21     Referring Physician  Dr. Nix     Patient/Family/Caregiver Comments/Observations  RN cleared pt for PT session     General Observations of Patient  Pt supine in bed and agreeable to participate in PT session     Existing Precautions/Restrictions  fall;cardiac;other (see comments)        Cognition/Psychosocial    Affect/Mental Status (Cognition)  WNL     Orientation Status (Cognition)  oriented x 4     Follows Commands (Cognition)  WNL     Cognitive Function  WNL     Comment, Cognition  Pt AAOx4, pleasant and cooperative t/o session        Range of Motion (ROM)    Range of Motion  ROM is WNL;bilateral lower extremities        Range of Motion Comprehensive    Comprehensive Range of Motion  ROM is WNL        Bed Mobility    Harris, Supine to Sit  independent     Assistive Device  none     Comment (Bed Mobility)  from flat surface, pt performed safely w/ no physical assist provided         Transfers    Transfers  toilet transfer     Comment  performed STS trxfer from EOB        Sit to Stand Transfer    Harris, Sit to Stand Transfer  independent     Assistive Device  none     Comment  from EOB        Toilet Transfer    Transfer Technique  sit-stand;stand-sit     Harris, Toilet Transfer  independent     Assistive Device  grab bars/safety frame     Comment  performed safely with no physical assistance provided         Gait Training    Harris, Gait  independent     Assistive Device  none     Distance in Feet  250 feet     Pattern (Gait)  step-through     Comment (Gait/Stairs)  demo'd steady pace t/o hallways, no LOB noted        Stairs Training    Harris,  Stairs  independent     Assistive Device  none     Handrail Location (Stairs)  left side (ascending);right side (descending)     Number of Stairs  8     Stair Height  6 inches     Ascending Stairs Technique  step-over-step     Descending Stairs Technique  step-over-step     Comment  navigated stairs safely with no physical assist provided         Balance    Balance Assessment  sitting static balance;sit to stand dynamic balance;standing static balance     Static Sitting Balance  WFL     Sit to Stand Dynamic Balance  WFL     Static Standing Balance  WFL     Dynamic Standing Balance  WFL        AM-PAC (TM) - Mobility (Current Function)    Turning from your back to your side while in a flat bed without using bedrails?  4 - None     Moving from lying on your back to sitting on the side of a flat bed without using bedrails?  4 - None     Moving to and from a bed to a chair?  4 - None     Standing up from a chair using your arms?  4 - None     To walk in a hospital room?  4 - None     Climbing 3-5 steps with a railing?  4 - None     AM-PAC (TM) Mobility Score  24                 Education Documentation  Unresolved/Worsening Symptoms, taught by Luis M Cramer PTA at 7/19/2021 12:28 PM.  Learner: Patient  Readiness: Acceptance  Method: Explanation, Demonstration  Response: Verbalizes Understanding, Demonstrated Understanding  Comment: Educated in safe trxfers and ambulation on levels and stairs    Joint Mobility/Strength, taught by Luis M Cramer PTA at 7/19/2021 12:28 PM.  Learner: Patient  Readiness: Acceptance  Method: Explanation, Demonstration  Response: Verbalizes Understanding, Demonstrated Understanding  Comment: Educated in safe trxfers and ambulation on levels and stairs    Home Safety, taught by Luis M Cramer PTA at 7/19/2021 12:28 PM.  Learner: Patient  Readiness: Acceptance  Method: Explanation, Demonstration  Response: Verbalizes Understanding, Demonstrated Understanding  Comment: Educated in safe trxfers and  ambulation on levels and stairs    Energy Conservation, taught by Luis M Cramer, PTA at 7/19/2021 12:28 PM.  Learner: Patient  Readiness: Acceptance  Method: Explanation, Demonstration  Response: Verbalizes Understanding, Demonstrated Understanding  Comment: Educated in safe trxfers and ambulation on levels and stairs    Assistive/Adaptive Devices, taught by Luis M Cramer, PTA at 7/19/2021 12:28 PM.  Learner: Patient  Readiness: Acceptance  Method: Explanation, Demonstration  Response: Verbalizes Understanding, Demonstrated Understanding  Comment: Educated in safe trxfers and ambulation on levels and stairs    Signs/Symptoms, taught by Luis M Cramer, PTA at 7/19/2021 12:28 PM.  Learner: Patient  Readiness: Acceptance  Method: Explanation, Demonstration  Response: Verbalizes Understanding, Demonstrated Understanding  Comment: Educated in safe trxfers and ambulation on levels and stairs    Risk Factors, taught by Luis M Cramer, PTA at 7/19/2021 12:28 PM.  Learner: Patient  Readiness: Acceptance  Method: Explanation, Demonstration  Response: Verbalizes Understanding, Demonstrated Understanding  Comment: Educated in safe trxfers and ambulation on levels and stairs          PT Assessment/Plan      Most Recent Value   Daily Outcome Statement  PT treatment completed. Pt performing at independent level for all mobility. No physical assistance provided t/o session. Good safety awareness and endurance noted. [facility] mob score 24. Pt cleared for discharge home once medically stable. Note to be sent to supervising PT 2* pt achieving all stated goals. No further PT needs required. at 07/19/2021 1206   PT Recommended Discharge Disposition  home with assistance at 07/19/2021 1206   Rehab Potential  good, to achieve stated therapy goals at 07/19/2021 1206   Therapy Frequency  5-7 times/wk at 07/19/2021 1206   Planned Therapy Interventions  balance training, bed mobility training, gait training, transfer training, strengthening, stair  training at 07/18/2021 1040   Anticipated Equipment Needs at Discharge (PT)  none at 07/19/2021 1206   Patient/Family Therapy Goals Statement  return home at 07/18/2021 1040          PT Goals      Most Recent Value   Bed Mobility Goal 1   Activity/Assistive Device  bed mobility activities, all at 07/18/2021 1040   Cassandra  modified independence at 07/18/2021 1040   Time Frame  by discharge at 07/18/2021 1040   Progress/Outcome  goal ongoing at 07/18/2021 1040   Transfer Goal 1   Activity/Assistive Device  all transfers at 07/18/2021 1040   Cassandra  modified independence at 07/18/2021 1040   Time Frame  by discharge at 07/18/2021 1040   Progress/Outcome  goal ongoing at 07/18/2021 1040   Gait Training Goal 1   Activity/Assistive Device  gait (walking locomotion) at 07/18/2021 1040   Cassandra  modified independence at 07/18/2021 1040   Distance  250 at 07/18/2021 1040   Time Frame  by discharge at 07/18/2021 1040   Progress/Outcome  goal ongoing at 07/18/2021 1040   Stairs Goal 1   Activity/Assistive Device  stairs, all skills at 07/18/2021 1040   Cassandra  modified independence at 07/18/2021 1040   Number of Stairs  12 at 07/18/2021 1040   Time Frame  by discharge at 07/18/2021 1040   Progress/Outcome  goal ongoing at 07/18/2021 1040

## 2021-07-19 NOTE — PLAN OF CARE
Problem: Adult Inpatient Plan of Care  Goal: Plan of Care Review  Flowsheets (Taken 7/19/2021 1226)  Progress: improving  Plan of Care Reviewed With: patient  Outcome Summary: PT treatment completed. Pt performing at independent level for all mobility. No physical assistance provided t/o session. Good safety awareness and endurance noted. [facility] mob score 24. Pt cleared for discharge home once medically stable. Note to be sent to supervising PT 2* pt achieving all stated goals. No further PT needs required.

## 2021-07-19 NOTE — PLAN OF CARE
Problem: Adult Inpatient Plan of Care  Goal: Plan of Care Review  Outcome: Progressing  Flowsheets (Taken 7/19/2021 0121)  Progress: improving  Plan of Care Reviewed With: patient  Outcome Summary: Hemodynamically stable, ambulates to BR, pain controlled with oxycodone. IS to 1L. 1 fransico remains, draing dark drge  Goal: Patient-Specific Goal (Individualized)  Outcome: Progressing  Goal: Absence of Hospital-Acquired Illness or Injury  Outcome: Progressing  Goal: Optimal Comfort and Wellbeing  Outcome: Progressing  Goal: Readiness for Transition of Care  Outcome: Progressing   Plan of Care Review  Plan of Care Reviewed With: patient  Progress: improving  Outcome Summary: Hemodynamically stable, ambulates to BR, pain controlled with oxycodone. IS to 1L. 1 fransico remains, draing dark drge

## 2021-07-20 ENCOUNTER — APPOINTMENT (INPATIENT)
Dept: RADIOLOGY | Facility: HOSPITAL | Age: 61
DRG: 233 | End: 2021-07-20
Attending: NURSE PRACTITIONER
Payer: COMMERCIAL

## 2021-07-20 ENCOUNTER — APPOINTMENT (INPATIENT)
Dept: RADIOLOGY | Facility: HOSPITAL | Age: 61
DRG: 233 | End: 2021-07-20
Attending: PHYSICIAN ASSISTANT
Payer: COMMERCIAL

## 2021-07-20 VITALS
DIASTOLIC BLOOD PRESSURE: 53 MMHG | BODY MASS INDEX: 20.72 KG/M2 | TEMPERATURE: 97.6 F | HEART RATE: 82 BPM | SYSTOLIC BLOOD PRESSURE: 97 MMHG | OXYGEN SATURATION: 98 % | RESPIRATION RATE: 18 BRPM | HEIGHT: 66 IN | WEIGHT: 128.9 LBS

## 2021-07-20 PROBLEM — J90 PLEURAL EFFUSION, LEFT: Status: ACTIVE | Noted: 2021-07-20

## 2021-07-20 LAB
ANION GAP SERPL CALC-SCNC: 10 MEQ/L (ref 3–15)
ANION GAP SERPL CALC-SCNC: 9 MEQ/L (ref 3–15)
BUN SERPL-MCNC: 13 MG/DL (ref 8–20)
BUN SERPL-MCNC: 13 MG/DL (ref 8–20)
CALCIUM SERPL-MCNC: 9.2 MG/DL (ref 8.9–10.3)
CALCIUM SERPL-MCNC: 9.4 MG/DL (ref 8.9–10.3)
CHLORIDE SERPL-SCNC: 100 MEQ/L (ref 98–109)
CHLORIDE SERPL-SCNC: 103 MEQ/L (ref 98–109)
CO2 SERPL-SCNC: 25 MEQ/L (ref 22–32)
CO2 SERPL-SCNC: 30 MEQ/L (ref 22–32)
CREAT SERPL-MCNC: 1 MG/DL (ref 0.6–1.1)
CREAT SERPL-MCNC: 1.1 MG/DL (ref 0.6–1.1)
GFR SERPL CREATININE-BSD FRML MDRD: 50.2 ML/MIN/1.73M*2
GFR SERPL CREATININE-BSD FRML MDRD: 56 ML/MIN/1.73M*2
GLUCOSE SERPL-MCNC: 112 MG/DL (ref 70–99)
GLUCOSE SERPL-MCNC: 97 MG/DL (ref 70–99)
MAGNESIUM SERPL-MCNC: 1.8 MG/DL (ref 1.8–2.5)
POCT ACT-HR: 124 SEC (ref 100–140)
POCT ACT-HR: 323 SEC (ref 100–140)
POCT TEST: ABNORMAL
POCT TEST: NORMAL
POTASSIUM SERPL-SCNC: 4.6 MEQ/L (ref 3.6–5.1)
POTASSIUM SERPL-SCNC: 5.8 MEQ/L (ref 3.6–5.1)
SODIUM SERPL-SCNC: 138 MEQ/L (ref 136–144)
SODIUM SERPL-SCNC: 139 MEQ/L (ref 136–144)

## 2021-07-20 PROCEDURE — 71045 X-RAY EXAM CHEST 1 VIEW: CPT

## 2021-07-20 PROCEDURE — 82310 ASSAY OF CALCIUM: CPT | Performed by: NURSE PRACTITIONER

## 2021-07-20 PROCEDURE — 99233 SBSQ HOSP IP/OBS HIGH 50: CPT | Performed by: INTERNAL MEDICINE

## 2021-07-20 PROCEDURE — 63700000 HC SELF-ADMINISTRABLE DRUG: Performed by: NURSE PRACTITIONER

## 2021-07-20 PROCEDURE — 80048 BASIC METABOLIC PNL TOTAL CA: CPT | Performed by: PHYSICIAN ASSISTANT

## 2021-07-20 PROCEDURE — 36415 COLL VENOUS BLD VENIPUNCTURE: CPT | Performed by: NURSE PRACTITIONER

## 2021-07-20 PROCEDURE — 99232 SBSQ HOSP IP/OBS MODERATE 35: CPT | Mod: 25 | Performed by: INTERNAL MEDICINE

## 2021-07-20 PROCEDURE — 71046 X-RAY EXAM CHEST 2 VIEWS: CPT

## 2021-07-20 PROCEDURE — 63700000 HC SELF-ADMINISTRABLE DRUG: Performed by: PHYSICIAN ASSISTANT

## 2021-07-20 PROCEDURE — 83735 ASSAY OF MAGNESIUM: CPT | Performed by: PHYSICIAN ASSISTANT

## 2021-07-20 RX ORDER — OXYCODONE HYDROCHLORIDE 5 MG/1
5 TABLET ORAL EVERY 4 HOURS PRN
Qty: 15 TABLET | Refills: 0 | Status: SHIPPED | OUTPATIENT
Start: 2021-07-20 | End: 2021-07-25

## 2021-07-20 RX ORDER — CLOPIDOGREL BISULFATE 75 MG/1
75 TABLET ORAL DAILY
Qty: 30 TABLET | Refills: 3 | Status: SHIPPED | OUTPATIENT
Start: 2021-07-21 | End: 2021-08-03 | Stop reason: SDUPTHER

## 2021-07-20 RX ORDER — COLCHICINE 0.6 MG/1
0.6 TABLET ORAL DAILY
Qty: 14 TABLET | Refills: 0 | Status: SHIPPED | OUTPATIENT
Start: 2021-07-21 | End: 2021-08-03 | Stop reason: SDUPTHER

## 2021-07-20 RX ORDER — ACETAMINOPHEN 325 MG/1
975 TABLET ORAL EVERY 6 HOURS
Qty: 360 TABLET | Refills: 0
Start: 2021-07-20 | End: 2021-08-19

## 2021-07-20 RX ORDER — LANOLIN ALCOHOL/MO/W.PET/CERES
400 CREAM (GRAM) TOPICAL 2 TIMES DAILY
Qty: 173 TABLET | Refills: 0 | Status: SHIPPED | OUTPATIENT
Start: 2021-07-20 | End: 2021-10-29 | Stop reason: SDUPTHER

## 2021-07-20 RX ADMIN — ACETAMINOPHEN 975 MG: 325 TABLET, FILM COATED ORAL at 05:17

## 2021-07-20 RX ADMIN — Medication 1 TABLET: at 09:27

## 2021-07-20 RX ADMIN — STANDARDIZED SENNA CONCENTRATE 2 TABLET: 8.6 TABLET ORAL at 09:28

## 2021-07-20 RX ADMIN — CLOPIDOGREL BISULFATE 75 MG: 75 TABLET ORAL at 09:27

## 2021-07-20 RX ADMIN — GABAPENTIN 300 MG: 300 CAPSULE ORAL at 09:26

## 2021-07-20 RX ADMIN — MAGNESIUM OXIDE TAB 400 MG (241.3 MG ELEMENTAL MG) 400 MG: 400 (241.3 MG) TAB at 09:27

## 2021-07-20 RX ADMIN — BUPROPION HYDROCHLORIDE 300 MG: 300 TABLET, EXTENDED RELEASE ORAL at 09:27

## 2021-07-20 RX ADMIN — METOPROLOL SUCCINATE 12.5 MG: 25 TABLET, EXTENDED RELEASE ORAL at 09:27

## 2021-07-20 RX ADMIN — DULOXETINE 90 MG: 60 CAPSULE, DELAYED RELEASE ORAL at 09:26

## 2021-07-20 RX ADMIN — COLCHICINE 0.6 MG: 0.6 TABLET, FILM COATED ORAL at 09:26

## 2021-07-20 RX ADMIN — FAMOTIDINE 20 MG: 20 TABLET ORAL at 09:27

## 2021-07-20 RX ADMIN — DOCUSATE SODIUM 200 MG: 100 CAPSULE ORAL at 09:27

## 2021-07-20 RX ADMIN — Medication 81 MG: at 09:28

## 2021-07-20 RX ADMIN — OXYCODONE HYDROCHLORIDE 5 MG: 5 TABLET ORAL at 05:17

## 2021-07-20 NOTE — DISCHARGE INSTRUCTIONS
DISCHARGE INSTRUCTIONS FOLLOWING CARDIAC SURGERY       Like any major operation, heart surgery is expected to drain a great deal of your strength and energy. It requires patience, determination, and, most of all, TIME to recover fully. Initially, you may be surprised by the amount of exertion which will cause you to stop and rest.      GUIDELINE FOR ACTIVITY    During your first week at home we strongly suggest that you have assistance from family and friends. You can expect to have good days and bad days, so regulate activities according to what your body tells you. Try to balance rest and activity, and when resting remember to keep your legs elevated.   It is important to continue to weigh yourself everyday, just as you were weighed daily in the hospital. Try to weigh yourself every morning after going to the bathroom, using the same scale. A gradual weight gain of 5 pounds in 3 to 5 days, or any increase in puffiness or swelling in fingers and ankles, should be reported to your doctor immediately.   It is also important to shower daily, in order to keep your incisions clean. You should check your incision daily, and report any increased redness, drainage, or a temperature over 101 to your surgeon immediately.      RESPIRATORY/BREATHING    For the first week at home, continue to use the Incentive Spirometer that you were using in the hospital. Try to use it 4-5 times each day, doing 10 puffs each time. During the second week, you can decrease the amount to 3 times each day, doing 10 puffs each time. Should any unusual shortness of breath occur at any time, notify your doctor immediately.      DIET   For the first week at home, eat what you want! We just want to make sure that you eat! It may take a while for your appetite to come back, so maybe six small meals a day will be easier than three large meals. Also try to limit your fluid intake to 6-7 cups per day. Remember to weigh yourself daily and report any sudden  "weight gain. By the third week you should start to follow a heart healthy diet. This means avoiding high salt content foods, and eating a diet balanced with protein, fruit, vegetables, and fiber.      BOWELS   If you do not have a bowel movement within 2 days of being at home, try some prune juice or an over-the-counter laxative. If there is still no bowel movement in 3 days, try an over-the-counter suppository. If there is no bowel movement in the first week, notify your doctor immediately.      MEDICATIONS    At discharge from the hospital, your nurse went over all the medications you are going to take at home. Remember to take your medications on time according to the schedule given to you by your nurse.      PAIN MANAGEMENT   You will be sent home with pain medication to use at home. Remember to use your pain pills when you need them, don't \"tough it out\"! Use your pain pills before activity, at bedtime, and when needed. By the second or third week you may be able to use Extra Strength Tylenol for pain. Should you experience any pain not relieved by your pain pills, notify your doctor immediately. Remember, no driving while taking prescription pain meds!            WHEN TO CALL THE DOCTOR      As previously mentioned, call the doctor for:   ~ Temperature over 101   ~ Increased redness or drainage from incisions   ~ Unusual shortness of breath   ~ No bowel movement in the first week   ~ Gradual weight gain of 5 pounds in 3-5 days   ~ Increased puffiness or swelling in fingers or ankles   ~ Pain not relieved by pain pills      FOR ANY QUESTIONS OR CONCERNS,    CALL SURGEON'S OFFICE: [phone]      "

## 2021-07-20 NOTE — DISCHARGE SUMMARY
Cardiac Surgery Discharge Summary    BRIEF OVERVIEW    Attending Provider: Felix Nix DO Attending phys phone: [phone]  Primary Care Physician at Discharge: Jorge Robertson [phone]    Admission Date: 7/14/2021     Discharge Date: 7/20/2021    Primary Discharge Diagnosis  Coronary artery dissection    Secondary Discharge Diagnosis  Active Hospital Problems    Diagnosis Date Noted   • Pleural effusion, left 07/20/2021   • S/P CABG x 1 07/09/2021   • Coronary artery dissection 07/09/2021   • Dyslipidemia 07/09/2021      Resolved Hospital Problems   No resolved problems to display.       DETAILS OF HOSPITAL STAY    Operative Procedures Performed  7/16 Robotically assisted LIMA-LAD takedown by Dr. Nix.     Consult Orders During Admission:  IP CONSULT TO CARDIAC SURGERY  IP CONSULT TO NUTRITION SERVICES  IP CONSULT TO CARDIAC REHAB  IP CONSULT TO CASE MANAGEMENT  IP CONSULT TO PHYSICAL MEDICINE REHAB     Imaging  X-RAY CHEST 2 VIEWS    Result Date: 7/20/2021  IMPRESSION: No pneumothorax.     X-RAY CHEST 2 VIEWS    Result Date: 7/16/2021  IMPRESSION: No acute disease in the chest.    X-RAY CHEST 1 VIEW    Result Date: 7/20/2021  IMPRESSION: Biapical pleural thickening noted.  Left there is mild bibasilar pleural-parenchymal opacity.  Left chest tube is noted.  Left midlung zone airspace opacities unchanged     X-RAY CHEST 1 VIEW    Result Date: 7/19/2021  IMPRESSION: Interval decrease in the now small left pleural effusion. No significant interval change in the tiny left pneumothorax.     X-RAY CHEST 1 VIEW    Result Date: 7/18/2021  IMPRESSION: Again seen is left pleural-parenchymal opacity similar to the previous study one chest tube remains.  A small pleural line is seen within the left chest tube had been cannot exclude a small pneumothorax Finding:    New or increased pneumothorax   Acuity: Critical  Status:  CLOSED Critical read back was performed and results were read back by ROMEO JUAREZ on  7/18/2021 2:24 PM     X-RAY CHEST 1 VIEW    Result Date: 7/18/2021  IMPRESSION: As above    X-RAY CHEST 1 VIEW    Result Date: 7/18/2021  IMPRESSION:Unchanged left pleural-parenchymal disease COMMENT:Frontal portable erect view the chest was performed.  Chest tube tips project over left lung apex and lateral aspect of the left middle lung zone. Resolving subcutaneous emphysema.  Moderate to large left pleural parenchymal disease.  Right internal jugular central venous catheter tip projects over SVC. No evidence of a pneumothorax.    X-RAY CHEST 1 VIEW    Result Date: 7/17/2021  IMPRESSION: Increased left-sided pleural effusion and left basilar airspace opacity.  A tiny left pneumothorax cannot be excluded.    X-RAY CHEST 1 VIEW    Result Date: 7/17/2021  IMPRESSION: No definite pneumothorax.    X-RAY CHEST 1 VIEW    Result Date: 7/16/2021  IMPRESSION: 1.  Tiny left apical pneumothorax suggested. 2.  Extensive subcutaneous emphysema in comment. Finding:    New or increased pneumothorax   Acuity: Critical  Status:  CLOSED Critical read back was performed and results were read back by AUSTIN HDZ, 7/16/2021 8:26 PM.     Ultrasound carotid bilateral    Result Date: 7/14/2021  IMPRESSION: See above.      Presenting Problem/History of Present Illness  Non-ST elevation myocardial infarction (NSTEMI) (CMS/HCC) [I21.4]  Coronary artery disease involving native coronary artery of native heart without angina pectoris [I25.10]  CAD (coronary artery disease) [I25.10]  Coronary artery dissection [I25.42]       Exam on Day of Discharge  NPH 3 QHS  NPH 3 QHS  General: A&O x 3. NAD  Head and Neck: Normocephalic, atraumatic.  No carotid bruit.   Cardiovascular: RRR, no murmur  Respiratory: Slightly decreased bilat bases, no wheeze or rhonci.   Abdomen: Soft, non-tender, non-distended.  Normoactive bowel sounds.  No organomegaly appreciated.   Extremities: No lower extremity edema.  Palpable DP and PT pulses bilaterally.   Skin:  No lesions or rashes.  Right chest incisions CDI.      Hospital Course:   Mrs. Dean is a 61 year old female with past medical history significant for NSTEMI secondary to spontaneous coronary artery dissection (SCAD) in 2005 (received treatment in [place], no intervention per records), Hyperlipidemia, asthma, seasonal allergies and anxiety who presented for cardiac catheterization following to episodes of significant MCGUIRE.  She underwent cardiac catheterization that revealed significant LAD disease with uncertain pathology given her reported history of SCAD.  Her case was reviewed in Hybrid cath conference and she was taken to the operating room on July 16th for Robotically assisted coronary artery bypass x 1 (HEARD to LAD) by Dr. Nix.  The patient tolerated this procedure well and was transferred to the CTICU in stable condition. That evening her CO2 levels increased and she was placed on Bipap overnight.  She was also noted to have some post operative anemia over the next few days and was transfused a total of two units packed red blood cells.  Her rhythm remained stable NSR without atrial fibrillation.  She required minimal pulmonary toilet and responded well to diuresis with stable renal function.   Detailed preprinted postoperative instructions were provided and reviewed with the patient including their discharge medications and follow up appointments.    Admission weight: 125 lbs  Discharge weight: 128lbs.    Of note, given her unusual presentation and anatomy, history of SCAD and family history of premature CAD the pathology of her coronary disease is undetermined.  Differential from the perspective of the team would include intramural hematoma/SCAD, atherosclerotic CAD, and Fibromuscular dysplasia.     Discharge Orders  Released Discharge Orders     Order Details Provider Status    aspirin 81 mg enteric coated tablet Take 81 mg by mouth daily. Felicia Hicks PA C Resume at Discharge (Patient  Reported)    atorvastatin (LIPITOR) 40 mg tablet Take 40 mg by mouth nightly. Felicia Hicks PA C Resume at Discharge (Patient Reported)    buPROPion XL (WELLBUTRIN XL) 300 mg 24 hr tablet Take 300 mg by mouth daily. Felicia Hicks PA C Resume at Discharge (Patient Reported)    diphenhydrAMINE (BenadryL) 25 mg capsule Take 1 tablet by mouth nightly.   Felicia Hicks PA C Resume at Discharge (Patient Reported)    DULoxetine (CYMBALTA) 30 mg capsule Take 90 mg by mouth daily.   Felicia Hicks PA C Resume at Discharge (Patient Reported)    eszopiclone (LUNESTA) 3 mg tablet Take 3 mg by mouth nightly. Felicia Hicks PA C Resume at Discharge (Patient Reported)    LORazepam (ATIVAN) 0.5 mg tablet Take 0.5 mg by mouth as needed. Felicia Hicks PA C Resume at Discharge (Patient Reported)    montelukast (SINGULAIR) 10 mg tablet Take 1 tablet by mouth nightly. Felicia Hicks PA C Resume at Discharge (Patient Reported)    acetaminophen (TYLENOL) 325 mg tablet Take 3 tablets (975 mg total) by mouth every 6 (six) hours. Felicia Hicks PA C Prescribed    albumin human 5 % solution 500 mL 500 mL, intravenous, As needed, to maintain PAD/CVP, Starting on Fri 7/16/21 at 1742, For 90 daysICU Phase If filling pressure < min, mean BP , and urine output >= 30 mL/hr then do not treat. Felicia Hicks PA C Do Not Order at Discharge    albuterol HFA (VENTOLIN HFA) 90 mcg/actuation inhaler as needed. Felicia Hicks PA C Resume at Discharge (Patient Reported)    alum-mag hydroxide-simeth (MAALOX) 200-200-20 mg/5 mL suspension 30 mL 30 mL, oral, Every 4 hours PRN, indigestion, Starting on Fri 7/16/21 at 1742, For 90 days** Shake well before administration ** Felicia Hicks PA C Do Not Order at Discharge    aspirin enteric coated tablet 81 mg 81 mg, oral, Daily, First dose on Fri 7/16/21 at 1830Hold for platelet count < 50,000 ** Do not crush, chew, or danielle **  Fabiola  VANITA Myles Do Not Order at Discharge    atorvastatin (LIPITOR) tablet 40 mg 40 mg, oral, Nightly, First dose on Wed 7/14/21 at 2200 Felicia Hicks PA C Do Not Order at Discharge    atropine injection 0.5 mg 0.5 mg, intravenous, Every 5 min PRN, bradycardia, symptomatic bradycardia; max 3 mg and notify MD, Starting on Fri 7/16/21 at 1742, For 6 doses Felicia Hicks PA C Do Not Order at Discharge    azelastine (ASTELIN) 137 mcg (0.1 %) nasal spray as needed. Felicia Hicks PA C Resume at Discharge (Patient Reported)    buPROPion XL (WELLBUTRIN XL) 24 hr ER tablet 300 mg 300 mg, oral, Daily, First dose on Thu 7/15/21 at 0900** Do not crush, chew, or danielle **  Felicia Hicks PA C Do Not Order at Discharge    calcium chloride 1 g in sodium chloride 0.9 % 50 mL IVPB 1 g, intravenous, at 100 mL/hr, Administer over 30 Minutes, Every 6 hours PRN, ionized Ca less than 1.15 mmol/L, Starting on Fri 7/16/21 at 1742** CENTRAL LINE PREFERRED ** AVOID MIDLINE ** Felicia Hicks PA C Do Not Order at Discharge    calcium chloride 100 mg/mL (10 %) injection 0.5 g 0.5 g, intravenous, Once as needed, SBP < 70, Starting on Fri 7/16/21 at 1742, For 90 daysICU use.  IV push Felicia Hicks PA C Do Not Order at Discharge    clopidogreL (PLAVIX) 75 mg tablet Take 1 tablet (75 mg total) by mouth daily. Felicia Hicks PA C Prescribed    colchicine (COLCRYS) 0.6 mg tablet Take 1 tablet (0.6 mg total) by mouth daily. Felicia Hicks PA C Prescribed    diphenhydrAMINE (BENADRYL) capsule 25 mg 25 mg, oral, Every 6 hours PRN, itching, itching/pruritis, Starting on Fri 7/16/21 at 1742, For 90 days Felicia Hicks PA C Do Not Order at Discharge    diphenhydrAMINE (BENADRYL) injection 25 mg 25 mg, intravenous, Every 6 hours PRN, itching/pruritis, Starting on Fri 7/16/21 at 1742, For 90 daysIf unable to take oral. Felicia Hicks PA C Do Not Order at Discharge    docusate sodium (COLACE)  capsule 200 mg 200 mg, oral, 2 times daily, First dose on Fri 7/16/21 at 2000, For 90 daysHold for diarrhea Felicia Hicks PA C Do Not Order at Discharge    DULoxetine (CYMBALTA) capsule,delayed release(DR/EC) 90 mg 90 mg, oral, Daily, First dose on Thu 7/15/21 at 0900* May add contents to apple juice or applesauce (but NOT water) and administer via tube * Felicia Hicks PA C Do Not Order at Discharge    enoxaparin (LOVENOX) syringe 40 mg 40 mg, subcutaneous, Daily (6p), First dose on Mon 7/19/21 at 1800*Caution*: Do not purge air bubble from 30mg or 40mg syringes prior to administration to avoid loss of drug. The air bubble may be expelled from doses larger than 40mg to adjust for exact dose.  Must be deep SubQ Injection with the patient lying down.  Use left and right anteroLATERAL and left and right posteroLATERAL abdominal wall.  AVOID injections sites adjacent to SURGICAL SITES.  Must alternate sites from side to side with each injection.   Felicia Hicks PA C Do Not Order at Discharge    furosemide (LASIX) tablet 40 mg 40 mg, oral, Daily, First dose on Tue 7/20/21 at 0900, For 90 days Felicia Hicks PA C Do Not Order at Discharge    gabapentin (NEURONTIN) capsule 300 mg 300 mg, oral, 2 times daily, First dose on Fri 7/16/21 at 2000, For 90 days Felicia Hicks PA C Do Not Order at Discharge    LORazepam (ATIVAN) tablet 0.5 mg 0.5 mg, oral, Every 6 hours PRN, anxiety, Starting on Wed 7/14/21 at 1149 Felicia Hicks PA C Do Not Order at Discharge    magnesium oxide (MAG-OX) 400 mg (241.3 mg magnesium) tablet Take 1 tablet (400 mg total) by mouth 2 (two) times a day for 173 doses. Felicia Hicks PA C Prescribed    magnesium sulfate IVPB 2g in 50 mL NSS/D5W/SWFI 2 g, intravenous, at 25 mL/hr, Administer over 120 Minutes, As needed, if magnesium level < 2.2 mg/dL, Starting on Fri 7/16/21 at 1742 Felicia Hicks PA C Do Not Order at Discharge    melatonin capsule 5 mg 5  mg, oral, Nightly, First dose on Mon 7/19/21 at 2315 Felicia Hicks PA C Do Not Order at Discharge    metoprolol succinate XL (TOPROL-XL) 12.5 mg Take 1 split tablet (12.5 mg total) by mouth nightly. Felicia Hicks PA C Prescribed    montelukast (SINGULAIR) tablet 10 mg 10 mg, oral, Nightly, First dose on Wed 7/14/21 at 2200 Felicia Hicks PA C Do Not Order at Discharge    multivitamin tablet 1 tablet 1 tablet, oral, Daily, First dose on Fri 7/16/21 at 1830, For 90 days Felicia Hicks PA C Do Not Order at Discharge    olopatadine (PATANOL) 0.1 % ophthalmic solution as needed. Felicia Hicks PA C Resume at Discharge (Patient Reported)    ondansetron (ZOFRAN) injection 4 mg 4 mg, intravenous, Every 8 hours PRN, nausea, vomiting, Nausea/Vomiting, Starting on Fri 7/16/21 at 1742, For 90 daysIf unable to take orally. Felicia Hicks PA C Do Not Order at Discharge    ondansetron ODT (ZOFRAN-ODT) disintegrating tablet 4 mg 4 mg, oral, Every 8 hours PRN, nausea, vomiting, Nausea/Vomiting, Starting on Fri 7/16/21 at 1742, For 90 days Felicia Hicks PA C Do Not Order at Discharge    oxyCODONE (ROXICODONE) 5 mg immediate release tablet Take 1 tablet (5 mg total) by mouth every 4 (four) hours as needed (For continue postoperative pain managment.) for up to 5 days. Felicia Hicks PA C Prescribed    potassium chloride 20 mEq in 100 mL IVPB  (premix) 20 mEq, intravenous, at 50 mL/hr, Administer over 120 Minutes, Every 8 hours PRN, < 4.5 mEq/l target 4.5-5, Starting on Fri 7/16/21 at 1742, For 90 daysICU Only.  Central Line Only.  Target 4.5-5 mEq/L. Draw serum K one hour before.  Repeat up to 3 times, and notify physician if K continues < 4.5 mEq/L. ** CENTRAL LINE PREFERRED ** Felicia Hicks PA C Do Not Order at Discharge    senna (SENOKOT) tablet 2 tablet 2 tablet, oral, 2 times daily, First dose on Fri 7/16/21 at 2000, For 90 daysHold for diarrhea Felicia Hicks PA C Do  Not Order at Discharge    sodium bicarbonate 8.4 % (1 mEq/mL) injection  mEq  mEq, intravenous, As needed, metabolic acidosis, Starting on Fri 7/16/21 at 1742(ICU) If pH >7.35 do not treat  For base excess -3 to -4 give 50 mEq (1 syringe); -5 to -6 give 100 mEq (2 syringe) and notify MD.  >= -7 notify MD Felicia Hicks PA C Do Not Order at Discharge    triamcinolone (NASACORT) 55 mcg nasal inhaler as needed. Felicia Hicks PA C Resume at Discharge (Patient Reported)    Follow-up with primary physician (PCP) In 1-2 weeks. Please call to schedule an appointment. Felicia Hicks PA C New at Discharge    Follow-up with department Your cardiologist Dr. Hall as scheduled. Please call to schedule an appointment if you do not yet have one. Felicia Hicks PA C New at Discharge    Other Follow-up Dr. Nix's office as scheduled. Please call [phone] with any questions or concerns regarding your care.  If you are unable to reach someone you may call Dr. Nix at [phone]. Felicia Hicks PA C New at Discharge          Outpatient Follow-Ups            In 1 month Felix Nix DO [facility] Cardio Surgery at [facility]    In 2 months Eleazar Hall MD [facility]        Referrals:  No orders of the defined types were placed in this encounter.    Discharge Disposition  Final discharge disposition not confirmed  Code Status at Discharge: Full Code  Physician Order for Life-Sustaining Treatment Document Status      No documents found

## 2021-07-20 NOTE — PROGRESS NOTES
Cardiology Daily Progress Note    Subjective/Objective:  Patient is out of bed in a chair comfortable anxious to go home    Review of systems: No headaches still with some pain in the left chest on deep inspiration 10 other systems reviewed and were noncontributory    Physical exam: Middle-aged well-developed well-nourished female in no acute distress  Vital signs stable  Skin: Warm dry  HEENT: Normocephalic atraumatic  Neck: No bruits no adenopathy no thyromegaly  Lungs diminished breath sounds at the left base  Chest robotic scars healing  Cardiac regular rate very faint apical systolic rub  Abdomen soft no masses  Extremities no significant edema  Neuro no focal motor or sensory deficits    Laboratory data: All pertinent lab studies were personally reviewed    Assessment/Plan   1.  Postoperative day 4.  Status post robotic CABG with LIMA to the LAD for proximal LAD stenosis.  Previously this is been described as either intramural hematoma or perhaps recurrent SCAD.  Patient is hemodynamically stable and I believe suitable for discharge.  I have reviewed her medications and activities.  The patient said should see Dr. Mansoor Hall in approximately 7 to 10 days.  Patient is aware of the need for immediate attention if there is any change in her clinical status following discharge    2.  Dyslipidemia would continue atorvastatin after discharge although as noted yesterday I am not really sure the patient has atherosclerotic coronary artery disease.    3.  Diminished left basilar breath sounds.  This should resolve    4.  Hyperkalemia.  Potassium was 5.8 this morning with a BUN of 13 and a creatinine of 1.1.  This most likely represents a hemolyzed specimen and not a pathologic finding         Expected Discharge Date:  7/22/2021

## 2021-07-20 NOTE — PLAN OF CARE
Problem: Adult Inpatient Plan of Care  Goal: Plan of Care Review  Outcome: Progressing  Flowsheets (Taken 7/20/2021 0535)  Progress: improving  Plan of Care Reviewed With: patient  Outcome Summary: Pt VSS. Pain controlled with PRN and scheduled medication. Ambulating well with no SOB or difficulty. Lower chest tube maintained to fransico. Pt progressing well towards goals of discharge.  Goal: Patient-Specific Goal (Individualized)  Outcome: Progressing  Flowsheets (Taken 7/20/2021 0535)  Patient-Specific Goals (Include Timeframe): Pt will go on four walks today  Individualized Care Needs: ambulation  Anxieties, Fears or Concerns: denies   Plan of Care Review  Plan of Care Reviewed With: patient  Progress: improving  Outcome Summary: Pt VSS. Pain controlled with PRN and scheduled medication. Ambulating well with no SOB or difficulty. Lower chest tube maintained to fransico. Pt progressing well towards goals of discharge.

## 2021-07-20 NOTE — NURSING NOTE
mily and montor d/vasquez. AVS reviewed with patient and patient verbalized understanding. Belongings returned to patient. Pct took pt in wheelchair to meet  at \[facility]\"" [place]

## 2021-07-20 NOTE — PROGRESS NOTES
Cardiology Progress Note      HPI    Patient  denies  MCGUIRE, orthopnea, PND, edema, palpitations, syncope or near syncope.   Eating OK  Anxious to get chest tube out- incisional soreness            Current Facility-Administered Medications   Medication Dose Route Frequency Provider Last Rate Last Admin   • acetaminophen (TYLENOL) tablet 975 mg  975 mg oral q6h YOON Compa Goss PA C   975 mg at 07/20/21 0517   • albumin human 5 % solution 500 mL  500 mL intravenous PRN Compa Goss PA C   250 mL at 07/17/21 0606   • alum-mag hydroxide-simeth (MAALOX) 200-200-20 mg/5 mL suspension 30 mL  30 mL oral q4h PRN Compa Goss PA C       • aspirin enteric coated tablet 81 mg  81 mg oral Daily Compa Goss PA C   81 mg at 07/19/21 0902   • atorvastatin (LIPITOR) tablet 40 mg  40 mg oral Nightly Compa Goss PA C   40 mg at 07/19/21 2206   • atropine injection 0.5 mg  0.5 mg intravenous q5 min PRN Compa Goss PA C       • buPROPion XL (WELLBUTRIN XL) 24 hr ER tablet 300 mg  300 mg oral Daily Compa Goss PA C   300 mg at 07/19/21 0902   • calcium chloride 1 g in sodium chloride 0.9 % 50 mL IVPB  1 g intravenous q6h PRN Compa Goss PA C   Stopped at 07/17/21 0342   • calcium chloride 100 mg/mL (10 %) injection 0.5 g  0.5 g intravenous Once PRN Compa Goss PA C       • clopidogreL (PLAVIX) tablet 75 mg  75 mg oral Daily Compa Goss PA C   75 mg at 07/19/21 0902   • colchicine (COLCRYS) tablet 0.6 mg  0.6 mg oral Daily Compa Goss PA C   0.6 mg at 07/19/21 0902   • diphenhydrAMINE (BENADRYL) capsule 25 mg  25 mg oral q6h PRN Compa Goss PA C        Or   • diphenhydrAMINE (BENADRYL) injection 25 mg  25 mg intravenous q6h PRN Compa Goss PA C       • docusate sodium (COLACE) capsule 200 mg  200 mg oral BID Compa Goss PA C   200 mg at 07/19/21 1940   • DULoxetine (CYMBALTA) capsule,delayed release(DR/EC) 90 mg  90 mg oral Daily  Compa Goss PA C   90 mg at 07/19/21 0902   • enoxaparin (LOVENOX) syringe 40 mg  40 mg subcutaneous Daily (6p) Patrick Bazan CRNP   40 mg at 07/19/21 1714   • famotidine (PEPCID) injection 20 mg  20 mg intravenous BID Compa Goss PA C   20 mg at 07/16/21 2007    Or   • famotidine (PEPCID) tablet 20 mg  20 mg oral BID Compa Goss PA C   20 mg at 07/19/21 1941   • furosemide (LASIX) tablet 40 mg  40 mg oral Daily Compa Goss PA C       • gabapentin (NEURONTIN) capsule 300 mg  300 mg oral BID Compa Goss PA C   300 mg at 07/19/21 1941   • ketorolac (TORADOL) injection 30 mg  30 mg intravenous q6h PRN Compa Goss PA C   30 mg at 07/19/21 0253   • LORazepam (ATIVAN) tablet 0.5 mg  0.5 mg oral q6h PRN Compa Goss PA C   0.5 mg at 07/18/21 0326   • magnesium oxide (MAG-OX) tablet 400 mg  400 mg oral BID Compa Goss PA C   400 mg at 07/19/21 1940   • magnesium sulfate IVPB 2g in 50 mL NSS/D5W/SWFI  2 g intravenous PRN Compa Goss PA C   Stopped at 07/18/21 1424   • melatonin capsule 5 mg  5 mg oral Nightly Brian Lujan CRNP   5 mg at 07/19/21 2234   • metoprolol succinate XL (TOPROL-XL) split 24 hr ER tablet 12.5 mg  12.5 mg oral q12h YOON Brian Lujan CRNP   12.5 mg at 07/19/21 2235   • montelukast (SINGULAIR) tablet 10 mg  10 mg oral Nightly Compa Goss PA C   10 mg at 07/19/21 2206   • multivitamin tablet 1 tablet  1 tablet oral Daily Compa Goss PA C   1 tablet at 07/19/21 0902   • mupirocin (BACTROBAN) 2 % ointment 1 application  1 application Each Nostril BID Compa Goss PA C   1 application at 07/19/21 0903   • ondansetron ODT (ZOFRAN-ODT) disintegrating tablet 4 mg  4 mg oral q8h PRN Compa Goss PA C        Or   • ondansetron (ZOFRAN) injection 4 mg  4 mg intravenous q8h PRN Compa Goss PA C       • oxyCODONE (ROXICODONE) immediate release tablet 5 mg  5 mg oral q4h PRN Compa Goss PA  C   5 mg at 07/20/21 0517   • potassium chloride (KLOR-CON) tablet extended release 20 mEq  20 mEq oral BID Compa Goss PA C   20 mEq at 07/19/21 1941   • potassium chloride 20 mEq in 100 mL IVPB  (premix)  20 mEq intravenous q8h PRN Compa Goss PA C   Stopped at 07/18/21 1527   • senna (SENOKOT) tablet 2 tablet  2 tablet oral BID Compa Goss PA C   2 tablet at 07/19/21 1940   • sodium bicarbonate 8.4 % (1 mEq/mL) injection  mEq   mEq intravenous PRN Compa Goss PA C   50 mEq at 07/16/21 2134         Objective       Vitals:    07/20/21 0729   BP: (!) 119/58   Pulse: 71   Resp: 18   Temp: 36.6 °C (97.8 °F)   SpO2: 99%       Physical Exam  Constitutional:       Appearance: She is well-developed.   HENT:      Head: Normocephalic.   Eyes:      General:         Right eye: No discharge.         Left eye: No discharge.   Neck:      Vascular: No JVD.   Cardiovascular:      Rate and Rhythm: Normal rate and regular rhythm.      Heart sounds: Normal heart sounds.   Pulmonary:      Breath sounds: Examination of the left-lower field reveals decreased breath sounds. Decreased breath sounds present.   Abdominal:      General: Bowel sounds are normal.      Palpations: Abdomen is soft.   Musculoskeletal:         General: No deformity.   Skin:     Findings: No rash.   Neurological:      Mental Status: She is alert and oriented to person, place, and time.   Psychiatric:         Behavior: Behavior normal.          Labs   Lab Results   Component Value Date    WBC 9.37 07/19/2021    HGB 10.0 (L) 07/19/2021    HGB 10.0 (L) 07/19/2021    HCT 30.6 (L) 07/19/2021    HCT 30.6 (L) 07/19/2021     07/19/2021    CHOL 150 07/14/2021    TRIG 73 07/14/2021    HDL 77 07/14/2021    ALT 23 07/14/2021    AST 23 07/14/2021     07/20/2021    K 5.8 (H) 07/20/2021     07/20/2021    CREATININE 1.1 07/20/2021    BUN 13 07/20/2021    CO2 25 07/20/2021    TSH 2.33 07/14/2021    HGBA1C 5.5 07/14/2021        Imaging  CXR - on 7/20 see below       ECG on 7/19 see below    Telemetry: sinus    S/P CABG x 1  Assessment & Plan  Robotic LIMA to LAD on 7/16  Soreness at chest tube site  Otherwise feels well   ECG on 7/20 shows sinus, age indeterminate anteroseptal and possibly high lateral infarction- also present preop  Continue ASA, clopidogrel, colchicine, atorvastatin and metoprolol with parameters     * Coronary artery dissection  Assessment & Plan  Concern for FMD- will get CTA and MRA of head and neck, abdomen and pelvis later     Pleural effusion, left  Assessment & Plan  Small on 7/20 CXR    Dyslipidemia  Assessment & Plan  Continue atorvastatin           Eleazar Hall MD  7/20/2021

## 2021-07-21 ENCOUNTER — TELEPHONE (OUTPATIENT)
Dept: CARDIOTHORACIC SURGERY | Facility: CLINIC | Age: 61
End: 2021-07-21

## 2021-07-21 NOTE — TELEPHONE ENCOUNTER
Call to pt no answer l/m to see if she is available to come in tomorrow, Thu 7/22 a 11:45 for suture removal. Requested a return call to confirm.

## 2021-07-22 ENCOUNTER — OFFICE VISIT (OUTPATIENT)
Dept: CARDIOTHORACIC SURGERY | Facility: CLINIC | Age: 61
End: 2021-07-22
Payer: COMMERCIAL

## 2021-07-22 VITALS
HEART RATE: 94 BPM | DIASTOLIC BLOOD PRESSURE: 62 MMHG | OXYGEN SATURATION: 98 % | RESPIRATION RATE: 16 BRPM | BODY MASS INDEX: 20.49 KG/M2 | HEIGHT: 65 IN | TEMPERATURE: 95.9 F | WEIGHT: 123 LBS | SYSTOLIC BLOOD PRESSURE: 100 MMHG

## 2021-07-22 DIAGNOSIS — Z95.1 S/P CABG X 1: Primary | ICD-10-CM

## 2021-07-22 PROCEDURE — 99024 POSTOP FOLLOW-UP VISIT: CPT | Performed by: THORACIC SURGERY (CARDIOTHORACIC VASCULAR SURGERY)

## 2021-07-22 NOTE — PROGRESS NOTES
S/P OPERATIVE PROCEDURE 7/16/2021: Robotic-assisted small anterior thoracotomy, single coronary artery bypass, with left internal mammary artery bypass to the anterior descending artery.    7/22/2021  Brian returns today for stitch removal from her chest tube sites.  Sutures were removed.  Her heart was regular lungs were clear bilaterally and the wounds were healing well.  She will return for her routine visit in a couple weeks    Felix Nix,

## 2021-07-23 RX ORDER — LORAZEPAM 0.5 MG/1
0.5 TABLET ORAL AS NEEDED
Qty: 60 TABLET | Refills: 1 | Status: CANCELLED | OUTPATIENT
Start: 2021-07-23

## 2021-07-23 NOTE — TELEPHONE ENCOUNTER
DR PARHAM pt--requesting LORAZEPAM refill    Not a cardiac med--per protocol not escribed    Please advise    Thank you

## 2021-07-23 NOTE — TELEPHONE ENCOUNTER
Medicine Refill Request    Last Office Visit: Visit date not found  Last Telemedicine Visit: Visit date not found    Next Office Visit: Visit date not found  Next Telemedicine Visit: Visit date not found     Lorazepam 0.5MG 3 times a day      Current Outpatient Medications:   •  acetaminophen (TYLENOL) 325 mg tablet, Take 3 tablets (975 mg total) by mouth every 6 (six) hours., Disp: 360 tablet, Rfl: 0  •  albuterol HFA (VENTOLIN HFA) 90 mcg/actuation inhaler, as needed., Disp: , Rfl:   •  aspirin 81 mg enteric coated tablet, Take 81 mg by mouth daily., Disp: , Rfl:   •  atorvastatin (LIPITOR) 40 mg tablet, Take 40 mg by mouth nightly., Disp: , Rfl:   •  azelastine (ASTELIN) 137 mcg (0.1 %) nasal spray, as needed., Disp: , Rfl:   •  buPROPion XL (WELLBUTRIN XL) 300 mg 24 hr tablet, Take 300 mg by mouth daily., Disp: , Rfl:   •  clopidogreL (PLAVIX) 75 mg tablet, Take 1 tablet (75 mg total) by mouth daily., Disp: 30 tablet, Rfl: 3  •  colchicine (COLCRYS) 0.6 mg tablet, Take 1 tablet (0.6 mg total) by mouth daily., Disp: 14 tablet, Rfl: 0  •  diphenhydrAMINE (BenadryL) 25 mg capsule, Take 1 tablet by mouth nightly.  , Disp: , Rfl:   •  DULoxetine (CYMBALTA) 30 mg capsule, Take 90 mg by mouth daily.  , Disp: , Rfl:   •  eszopiclone (LUNESTA) 3 mg tablet, Take 3 mg by mouth nightly., Disp: , Rfl:   •  LORazepam (ATIVAN) 0.5 mg tablet, Take 0.5 mg by mouth as needed., Disp: , Rfl:   •  magnesium oxide (MAG-OX) 400 mg (241.3 mg magnesium) tablet, Take 1 tablet (400 mg total) by mouth 2 (two) times a day for 173 doses., Disp: 173 tablet, Rfl: 0  •  metoprolol succinate XL (TOPROL-XL) 12.5 mg, Take 1 split tablet (12.5 mg total) by mouth nightly., Disp: 30 split tablet, Rfl: 3  •  montelukast (SINGULAIR) 10 mg tablet, Take 1 tablet by mouth nightly., Disp: , Rfl:   •  olopatadine (PATANOL) 0.1 % ophthalmic solution, as needed., Disp: , Rfl:   •  oxyCODONE (ROXICODONE) 5 mg immediate release tablet, Take 1 tablet (5 mg  total) by mouth every 4 (four) hours as needed (For continue postoperative pain managment.) for up to 5 days. (Patient not taking: Reported on 7/22/2021 ), Disp: 15 tablet, Rfl: 0  •  triamcinolone (NASACORT) 55 mcg nasal inhaler, as needed., Disp: , Rfl:       BP Readings from Last 3 Encounters:   07/22/21 100/62   07/20/21 (!) 97/53   07/09/21 124/66       Recent Lab results:  Lab Results   Component Value Date    CHOL 150 07/14/2021   ,   Lab Results   Component Value Date    HDL 77 07/14/2021   ,   Lab Results   Component Value Date    LDLCALC 58 07/14/2021   ,   Lab Results   Component Value Date    TRIG 73 07/14/2021        Lab Results   Component Value Date    GLUCOSE 112 (H) 07/20/2021   ,   Lab Results   Component Value Date    HGBA1C 5.5 07/14/2021         Lab Results   Component Value Date    CREATININE 1.0 07/20/2021       Lab Results   Component Value Date    TSH 2.33 07/14/2021

## 2021-07-27 NOTE — ASSESSMENT & PLAN NOTE
7/14/2021 right/left heart catheterization showed 70% mid LAD focal stenosis just distal to a large diagonal vessel.  The borders of the lesion appear smooth and the lack of other angiographic disease suggests FMD or intramural hematoma as etiology rather than atherosclerotic disease. Low -normal LVEDP  Right heart catheterization showed low right and left heart filling pressures (RA 1, PA 30/8 (15), and PCW 3 mm Hg).  Cardiac output and index of 6.32 L/min and 3.9 L/min/m2 respectively.     She underwent CABG x1, LIMA to LAD, on 7/16/2021.    7/15/2021 echo with LVEF of 60%, no regional wall motion abnormalities, impaired relaxation with trace tricuspid regurgitation and estimated RVSP of 26 mmHg.    BNP level of 51 on 7/14 labs.

## 2021-07-27 NOTE — ASSESSMENT & PLAN NOTE
12/27/ 2005, she had a myocardial infarction due to a coronary artery dissection. Cath report said a second diagonal branch was the culprit vessel.  No PCI was performed. She presented at that time with severe jaw pain that then radiated into the chest and left arm. It was associated with severe nausea and some dyspnea.  I had previously discussed CTA or MRA of the cerebral and abdominal vessels to assess for FMD.     7/14/21 R/L heart catheterization showed 70% mid LAD focal stenosis just distal to a large diagonal vessel.  The borders of the lesion appear smooth and the lack of other angiographic disease suggests FMD or intramural hematoma as etiology rather than atherosclerotic disease.    7/14/2021 carotid ultrasound showed no hemodynamically significant stenosis bilaterally.    I had a discussion with her and her .   I discussed the association between SCAD and FMD  I advised CTA or MRA of head and neck, abdomen and pelvis at some point to assess for FMD

## 2021-07-27 NOTE — ASSESSMENT & PLAN NOTE
At time of recent hospitalization she remained in sinus rhythm without atrial fibrillation.  Potassium level of 4.6 with magnesium 1.8 on 7/20 labs.  No  Palpitations

## 2021-07-27 NOTE — ASSESSMENT & PLAN NOTE
Total cholesterol 150, triglycerides 73, HDL 77 and LDL 58 on July lipids.  Lipids well controlled on present regimen  Continue present medications and low fat, low cholesterol diet  For fasting labs before return

## 2021-07-27 NOTE — ASSESSMENT & PLAN NOTE
7/14/21 R/L heart catheterization showed 70% mid LAD focal stenosis just distal to a large diagonal vessel.  The borders of the lesion appear smooth and the lack of other angiographic disease suggests FMD or intramural hematoma as etiology rather than atherosclerotic disease.  She underwent robotic bypass x1, LIMA to LAD, on 7/16/2021.    Cardiac rehab discussed  To continue ASA  Will continue clopidogrel for now  Will continue colchicine until end of August- will stop if diarrhea

## 2021-07-28 ENCOUNTER — TELEPHONE (OUTPATIENT)
Dept: SCHEDULING | Facility: CLINIC | Age: 61
End: 2021-07-28

## 2021-07-28 NOTE — TELEPHONE ENCOUNTER
"I s/w pt.  She is s/p CABG x 1 on 7/16  She went out yesterday for a doctor's appt and thinks she may have \"over done it\",    I asked her to take it easy today.  She will continue on her colchicine (she has 7 days left) and I asked over the next 48-72 hours to take tylenol on a consistent basis to help with discomfort.  She also has the oxycodone to take as needed.  No exertional CP.  I told her if positional chest discomfort persists or worsens over the next couple days, to contact our office.    She has a f/u appt with us on 8/3    "

## 2021-08-03 ENCOUNTER — OFFICE VISIT (OUTPATIENT)
Dept: CARDIOLOGY | Facility: CLINIC | Age: 61
End: 2021-08-03
Payer: COMMERCIAL

## 2021-08-03 VITALS
DIASTOLIC BLOOD PRESSURE: 78 MMHG | BODY MASS INDEX: 19.99 KG/M2 | HEART RATE: 72 BPM | HEIGHT: 65 IN | SYSTOLIC BLOOD PRESSURE: 126 MMHG | RESPIRATION RATE: 16 BRPM | WEIGHT: 120 LBS

## 2021-08-03 DIAGNOSIS — R00.2 PALPITATIONS: ICD-10-CM

## 2021-08-03 DIAGNOSIS — R06.09 DYSPNEA ON EXERTION: ICD-10-CM

## 2021-08-03 DIAGNOSIS — Z95.1 S/P CABG X 1: ICD-10-CM

## 2021-08-03 DIAGNOSIS — E78.5 DYSLIPIDEMIA: ICD-10-CM

## 2021-08-03 DIAGNOSIS — R73.03 PREDIABETES: ICD-10-CM

## 2021-08-03 DIAGNOSIS — I25.42 CORONARY ARTERY DISSECTION: Primary | ICD-10-CM

## 2021-08-03 DIAGNOSIS — E83.42 HYPOMAGNESEMIA: ICD-10-CM

## 2021-08-03 DIAGNOSIS — I21.4 NSTEMI (NON-ST ELEVATED MYOCARDIAL INFARCTION) (CMS/HCC): ICD-10-CM

## 2021-08-03 DIAGNOSIS — J90 PLEURAL EFFUSION, LEFT: ICD-10-CM

## 2021-08-03 PROCEDURE — 99215 OFFICE O/P EST HI 40 MIN: CPT | Performed by: INTERNAL MEDICINE

## 2021-08-03 PROCEDURE — 3008F BODY MASS INDEX DOCD: CPT | Performed by: INTERNAL MEDICINE

## 2021-08-03 PROCEDURE — 93000 ELECTROCARDIOGRAM COMPLETE: CPT | Performed by: INTERNAL MEDICINE

## 2021-08-03 RX ORDER — CLOPIDOGREL BISULFATE 75 MG/1
75 TABLET ORAL DAILY
Qty: 30 TABLET | Refills: 3 | Status: SHIPPED | OUTPATIENT
Start: 2021-08-03 | End: 2021-10-29 | Stop reason: ALTCHOICE

## 2021-08-03 RX ORDER — COLCHICINE 0.6 MG/1
0.6 TABLET ORAL DAILY
Qty: 30 TABLET | Refills: 1 | Status: SHIPPED | OUTPATIENT
Start: 2021-08-03 | End: 2021-10-07 | Stop reason: SDUPTHER

## 2021-08-03 ASSESSMENT — ENCOUNTER SYMPTOMS
HEARTBURN: 0
COUGH: 0
BACK PAIN: 0
DIZZINESS: 0
SHORTNESS OF BREATH: 0
NAUSEA: 0
DIAPHORESIS: 0
WEIGHT LOSS: 1
SNORING: 0
DEPRESSION: 0
NERVOUS/ANXIOUS: 0
PALPITATIONS: 0
DYSPNEA ON EXERTION: 0
CLAUDICATION: 0
WEIGHT GAIN: 0

## 2021-08-03 NOTE — PROGRESS NOTES
Cardiology Note          HPI   Brian Dean is a 61 y.o. female  Underwent robotic HEARD to LAD on 7/16.  Her postoperative course has been uneventful.    also provided hx.   She has been at limited activity.  Patient  denies chest distress, MCGUIRE, orthopnea, PND, edema, palpitations, syncope or near syncope.         Past Medical History:   Diagnosis Date   • Allergies    • Anxiety    • Asthma    • Coronary artery disease    • Coronary artery dissection    • Lipid disorder    • NSTEMI (non-ST elevated myocardial infarction) (CMS/[facility])    • Parvovirus B19 infection        Past Surgical History:   Procedure Laterality Date   • CORONARY ARTERY BYPASS GRAFT  07/16/2021    robotic - single bypass   • REDUCTION MAMMAPLASTY         Social History     Tobacco Use   • Smoking status: Never Smoker   • Smokeless tobacco: Never Used   Substance Use Topics   • Alcohol use: Never   • Drug use: Never       Family History   Problem Relation Age of Onset   • Hypertension Biological Mother    • Heart disease Biological Brother        Allergies  Sulfa (sulfonamide antibiotics) and Sulfamethoxazole-trimethoprim    Current Outpatient Medications   Medication Sig Dispense Refill   • acetaminophen (TYLENOL) 325 mg tablet Take 3 tablets (975 mg total) by mouth every 6 (six) hours. 360 tablet 0   • albuterol HFA (VENTOLIN HFA) 90 mcg/actuation inhaler as needed.     • aspirin 81 mg enteric coated tablet Take 81 mg by mouth daily.     • atorvastatin (LIPITOR) 40 mg tablet Take 40 mg by mouth nightly.     • azelastine (ASTELIN) 137 mcg (0.1 %) nasal spray as needed.     • buPROPion XL (WELLBUTRIN XL) 300 mg 24 hr tablet Take 300 mg by mouth daily.     • clopidogreL (PLAVIX) 75 mg tablet Take 1 tablet (75 mg total) by mouth daily. 30 tablet 3   • colchicine (COLCRYS) 0.6 mg tablet Take 1 tablet (0.6 mg total) by mouth daily. 14 tablet 0   • diphenhydrAMINE (BenadryL) 25 mg capsule Take 1 tablet by mouth nightly.       • DULoxetine  (CYMBALTA) 30 mg capsule Take 90 mg by mouth daily.       • eszopiclone (LUNESTA) 3 mg tablet Take 3 mg by mouth nightly.     • LORazepam (ATIVAN) 0.5 mg tablet Take 0.5 mg by mouth as needed.     • magnesium oxide (MAG-OX) 400 mg (241.3 mg magnesium) tablet Take 1 tablet (400 mg total) by mouth 2 (two) times a day for 173 doses. 173 tablet 0   • metoprolol succinate XL (TOPROL-XL) 12.5 mg Take 1 split tablet (12.5 mg total) by mouth nightly. 30 split tablet 3   • montelukast (SINGULAIR) 10 mg tablet Take 1 tablet by mouth nightly.     • olopatadine (PATANOL) 0.1 % ophthalmic solution as needed.     • triamcinolone (NASACORT) 55 mcg nasal inhaler as needed.       No current facility-administered medications for this visit.         Review of Systems   Constitutional: Positive for weight loss. Negative for diaphoresis and weight gain.   Eyes: Negative for visual disturbance.   Cardiovascular: Negative for chest pain, claudication, dyspnea on exertion, leg swelling and palpitations.   Respiratory: Negative for cough, shortness of breath and snoring.    Skin: Negative for rash.   Musculoskeletal: Negative for arthritis and back pain.   Gastrointestinal: Negative for heartburn and nausea.   Genitourinary: Negative for nocturia.   Neurological: Negative for dizziness.   Psychiatric/Behavioral: Negative for depression. The patient is not nervous/anxious.        Objective     Vitals:    08/03/21 1417   BP: 126/78   Pulse: 72   Resp: 16   no orthostatic change  Weight 120  Pounds  Height 65 inches  Wt Readings from Last 3 Encounters:   08/03/21 54.4 kg (120 lb)   07/22/21 55.8 kg (123 lb)   07/20/21 58.5 kg (128 lb 14.4 oz)         Physical Exam  Constitutional:       Appearance: She is well-developed.   HENT:      Head: Normocephalic.   Eyes:      General:         Right eye: No discharge.         Left eye: No discharge.   Neck:      Vascular: No JVD.   Cardiovascular:      Rate and Rhythm: Normal rate and regular rhythm.       Heart sounds: Normal heart sounds.   Pulmonary:      Breath sounds: Normal breath sounds.   Abdominal:      General: Bowel sounds are normal.      Palpations: Abdomen is soft.   Musculoskeletal:         General: No deformity.   Skin:     Findings: No rash.      Comments: Incisions without signs of inflammation    Neurological:      Mental Status: She is alert and oriented to person, place, and time.   Psychiatric:         Behavior: Behavior normal.         Lab Results   Component Value Date    WBC 9.37 07/19/2021    HGB 10.0 (L) 07/19/2021    HGB 10.0 (L) 07/19/2021     07/19/2021    CHOL 150 07/14/2021    TRIG 73 07/14/2021    HDL 77 07/14/2021    ALT 23 07/14/2021    AST 23 07/14/2021     07/20/2021    K 4.6 07/20/2021    CREATININE 1.0 07/20/2021    TSH 2.33 07/14/2021    INR 0.9 07/19/2021    HGBA1C 5.5 07/14/2021     LDL cholesterol 58       ECG   Sinus, age indeterminate anteroseptal infarction, anterior T wave inversions.           Problem List Items Addressed This Visit        High    Dyspnea on exertion      7/14/2021 right/left heart catheterization showed 70% mid LAD focal stenosis just distal to a large diagonal vessel.  The borders of the lesion appear smooth and the lack of other angiographic disease suggests FMD or intramural hematoma as etiology rather than atherosclerotic disease. Low -normal LVEDP  Right heart catheterization showed low right and left heart filling pressures (RA 1, PA 30/8 (15), and PCW 3 mm Hg).  Cardiac output and index of 6.32 L/min and 3.9 L/min/m2 respectively.     She underwent CABG x1, LIMA to LAD, on 7/16/2021.    7/15/2021 echo with LVEF of 60%, no regional wall motion abnormalities, impaired relaxation with trace tricuspid regurgitation and estimated RVSP of 26 mmHg.    BNP level of 51 on 7/14 labs.         S/P CABG x 1     7/14/21 R/L heart catheterization showed 70% mid LAD focal stenosis just distal to a large diagonal vessel.  The borders of the lesion  appear smooth and the lack of other angiographic disease suggests FMD or intramural hematoma as etiology rather than atherosclerotic disease.  She underwent robotic bypass x1, LIMA to LAD, on 7/16/2021.    Cardiac rehab discussed  To continue ASA  Will continue clopidogrel for now  Will continue colchicine until end of August- will stop if diarrhea           Relevant Orders    ECG 12 LEAD-OFFICE PERFORMED       Medium    NSTEMI (non-ST elevated myocardial infarction) (CMS/HCC)     Non-STEMI in 2005 at the time of her coronary artery dissection.         Coronary artery dissection - Primary     12/27/ 2005, she had a myocardial infarction due to a coronary artery dissection. Cath report said a second diagonal branch was the culprit vessel.  No PCI was performed. She presented at that time with severe jaw pain that then radiated into the chest and left arm. It was associated with severe nausea and some dyspnea.  I had previously discussed CTA or MRA of the cerebral and abdominal vessels to assess for FMD.     7/14/21 R/L heart catheterization showed 70% mid LAD focal stenosis just distal to a large diagonal vessel.  The borders of the lesion appear smooth and the lack of other angiographic disease suggests FMD or intramural hematoma as etiology rather than atherosclerotic disease.    7/14/2021 carotid ultrasound showed no hemodynamically significant stenosis bilaterally.    I had a discussion with her and her .   I discussed the association between SCAD and FMD  I advised CTA or MRA of head and neck, abdomen and pelvis at some point to assess for FMD         Relevant Orders    ECG 12 LEAD-OFFICE PERFORMED    Palpitations     At time of recent hospitalization she remained in sinus rhythm without atrial fibrillation.  Potassium level of 4.6 with magnesium 1.8 on 7/20 labs.  No  Palpitations             Low    Prediabetes     Fasting blood glucose of 83 with hemoglobin A1c of 5.5 on July labs.         Dyslipidemia      Total cholesterol 150, triglycerides 73, HDL 77 and LDL 58 on July lipids.  Lipids well controlled on present regimen  Continue present medications and low fat, low cholesterol diet  For fasting labs before return           Pleural effusion, left     Not noted on exam today                 She will return later in the [place].   I spent 42 minutes on this date of service performing the following activities: obtaining history, performing examination, entering orders, documenting, preparing for visit, obtaining / reviewing records, providing counseling and education, independently reviewing study/studies and communicating results.      Eleazar Hall MD  8/3/2021

## 2021-08-03 NOTE — LETTER
August 3, 2021     Jorge Robertson MD  [address]  [address]  [facility] [address]    Patient: Brian Dean  YOB: 1960  Date of Visit: 8/3/2021      Dear Dr. Robertson:    Thank you for referring Brian Dean to me for evaluation. Below are my notes for this consultation.    If you have questions, please do not hesitate to call me. I look forward to following your patient along with you.         Sincerely,        Eleazar Hall MD        CC: MD Rafael Castillo James F, MD  8/3/2021  3:09 PM  Signed     Cardiology Note          HPI   Brian Dean is a 61 y.o. female  Underwent robotic HEARD to LAD on 7/16.  Her postoperative course has been uneventful.    also provided hx.   She has been at limited activity.  Patient  denies chest distress, MCGUIRE, orthopnea, PND, edema, palpitations, syncope or near syncope.         Past Medical History:   Diagnosis Date   • Allergies    • Anxiety    • Asthma    • Coronary artery disease    • Coronary artery dissection    • Lipid disorder    • NSTEMI (non-ST elevated myocardial infarction) (CMS/[facility])    • Parvovirus B19 infection        Past Surgical History:   Procedure Laterality Date   • CORONARY ARTERY BYPASS GRAFT  07/16/2021    robotic - single bypass   • REDUCTION MAMMAPLASTY         Social History     Tobacco Use   • Smoking status: Never Smoker   • Smokeless tobacco: Never Used   Substance Use Topics   • Alcohol use: Never   • Drug use: Never       Family History   Problem Relation Age of Onset   • Hypertension Biological Mother    • Heart disease Biological Brother        Allergies  Sulfa (sulfonamide antibiotics) and Sulfamethoxazole-trimethoprim    Current Outpatient Medications   Medication Sig Dispense Refill   • acetaminophen (TYLENOL) 325 mg tablet Take 3 tablets (975 mg total) by mouth every 6 (six) hours. 360 tablet 0   • albuterol HFA (VENTOLIN HFA) 90 mcg/actuation inhaler as needed.     • aspirin 81 mg enteric  coated tablet Take 81 mg by mouth daily.     • atorvastatin (LIPITOR) 40 mg tablet Take 40 mg by mouth nightly.     • azelastine (ASTELIN) 137 mcg (0.1 %) nasal spray as needed.     • buPROPion XL (WELLBUTRIN XL) 300 mg 24 hr tablet Take 300 mg by mouth daily.     • clopidogreL (PLAVIX) 75 mg tablet Take 1 tablet (75 mg total) by mouth daily. 30 tablet 3   • colchicine (COLCRYS) 0.6 mg tablet Take 1 tablet (0.6 mg total) by mouth daily. 14 tablet 0   • diphenhydrAMINE (BenadryL) 25 mg capsule Take 1 tablet by mouth nightly.       • DULoxetine (CYMBALTA) 30 mg capsule Take 90 mg by mouth daily.       • eszopiclone (LUNESTA) 3 mg tablet Take 3 mg by mouth nightly.     • LORazepam (ATIVAN) 0.5 mg tablet Take 0.5 mg by mouth as needed.     • magnesium oxide (MAG-OX) 400 mg (241.3 mg magnesium) tablet Take 1 tablet (400 mg total) by mouth 2 (two) times a day for 173 doses. 173 tablet 0   • metoprolol succinate XL (TOPROL-XL) 12.5 mg Take 1 split tablet (12.5 mg total) by mouth nightly. 30 split tablet 3   • montelukast (SINGULAIR) 10 mg tablet Take 1 tablet by mouth nightly.     • olopatadine (PATANOL) 0.1 % ophthalmic solution as needed.     • triamcinolone (NASACORT) 55 mcg nasal inhaler as needed.       No current facility-administered medications for this visit.         Review of Systems   Constitutional: Positive for weight loss. Negative for diaphoresis and weight gain.   Eyes: Negative for visual disturbance.   Cardiovascular: Negative for chest pain, claudication, dyspnea on exertion, leg swelling and palpitations.   Respiratory: Negative for cough, shortness of breath and snoring.    Skin: Negative for rash.   Musculoskeletal: Negative for arthritis and back pain.   Gastrointestinal: Negative for heartburn and nausea.   Genitourinary: Negative for nocturia.   Neurological: Negative for dizziness.   Psychiatric/Behavioral: Negative for depression. The patient is not nervous/anxious.        Objective     Vitals:     08/03/21 1417   BP: 126/78   Pulse: 72   Resp: 16   no orthostatic change  Weight 120  Pounds  Height 65 inches  Wt Readings from Last 3 Encounters:   08/03/21 54.4 kg (120 lb)   07/22/21 55.8 kg (123 lb)   07/20/21 58.5 kg (128 lb 14.4 oz)         Physical Exam  Constitutional:       Appearance: She is well-developed.   HENT:      Head: Normocephalic.   Eyes:      General:         Right eye: No discharge.         Left eye: No discharge.   Neck:      Vascular: No JVD.   Cardiovascular:      Rate and Rhythm: Normal rate and regular rhythm.      Heart sounds: Normal heart sounds.   Pulmonary:      Breath sounds: Normal breath sounds.   Abdominal:      General: Bowel sounds are normal.      Palpations: Abdomen is soft.   Musculoskeletal:         General: No deformity.   Skin:     Findings: No rash.      Comments: Incisions without signs of inflammation    Neurological:      Mental Status: She is alert and oriented to person, place, and time.   Psychiatric:         Behavior: Behavior normal.         Lab Results   Component Value Date    WBC 9.37 07/19/2021    HGB 10.0 (L) 07/19/2021    HGB 10.0 (L) 07/19/2021     07/19/2021    CHOL 150 07/14/2021    TRIG 73 07/14/2021    HDL 77 07/14/2021    ALT 23 07/14/2021    AST 23 07/14/2021     07/20/2021    K 4.6 07/20/2021    CREATININE 1.0 07/20/2021    TSH 2.33 07/14/2021    INR 0.9 07/19/2021    HGBA1C 5.5 07/14/2021     LDL cholesterol 58       ECG   Sinus, age indeterminate anteroseptal infarction, anterior T wave inversions.           Problem List Items Addressed This Visit        High    Dyspnea on exertion      7/14/2021 right/left heart catheterization showed 70% mid LAD focal stenosis just distal to a large diagonal vessel.  The borders of the lesion appear smooth and the lack of other angiographic disease suggests FMD or intramural hematoma as etiology rather than atherosclerotic disease. Low -normal LVEDP  Right heart catheterization showed low right  and left heart filling pressures (RA 1, PA 30/8 (15), and PCW 3 mm Hg).  Cardiac output and index of 6.32 L/min and 3.9 L/min/m2 respectively.     She underwent CABG x1, LIMA to LAD, on 7/16/2021.    7/15/2021 echo with LVEF of 60%, no regional wall motion abnormalities, impaired relaxation with trace tricuspid regurgitation and estimated RVSP of 26 mmHg.    BNP level of 51 on 7/14 labs.         S/P CABG x 1     7/14/21 R/L heart catheterization showed 70% mid LAD focal stenosis just distal to a large diagonal vessel.  The borders of the lesion appear smooth and the lack of other angiographic disease suggests FMD or intramural hematoma as etiology rather than atherosclerotic disease.  She underwent robotic bypass x1, LIMA to LAD, on 7/16/2021.    Cardiac rehab discussed  To continue ASA  Will continue clopidogrel for now  Will continue colchicine until end of August- will stop if diarrhea           Relevant Orders    ECG 12 LEAD-OFFICE PERFORMED       Medium    NSTEMI (non-ST elevated myocardial infarction) (CMS/[facility])     Non-STEMI in 2005 at the time of her coronary artery dissection.         Coronary artery dissection - Primary     12/27/ 2005, she had a myocardial infarction due to a coronary artery dissection. Cath report said a second diagonal branch was the culprit vessel.  No PCI was performed. She presented at that time with severe jaw pain that then radiated into the chest and left arm. It was associated with severe nausea and some dyspnea.  I had previously discussed CTA or MRA of the cerebral and abdominal vessels to assess for FMD.     7/14/21 R/L heart catheterization showed 70% mid LAD focal stenosis just distal to a large diagonal vessel.  The borders of the lesion appear smooth and the lack of other angiographic disease suggests FMD or intramural hematoma as etiology rather than atherosclerotic disease.    7/14/2021 carotid ultrasound showed no hemodynamically significant stenosis bilaterally.    I  had a discussion with her and her .   I discussed the association between SCAD and FMD  I advised CTA or MRA of head and neck, abdomen and pelvis at some point to assess for FMD         Relevant Orders    ECG 12 LEAD-OFFICE PERFORMED    Palpitations     At time of recent hospitalization she remained in sinus rhythm without atrial fibrillation.  Potassium level of 4.6 with magnesium 1.8 on 7/20 labs.  No  Palpitations             Low    Prediabetes     Fasting blood glucose of 83 with hemoglobin A1c of 5.5 on July labs.         Dyslipidemia     Total cholesterol 150, triglycerides 73, HDL 77 and LDL 58 on July lipids.  Lipids well controlled on present regimen  Continue present medications and low fat, low cholesterol diet  For fasting labs before return           Pleural effusion, left     Not noted on exam today                 She will return later in the [place].   I spent 42 minutes on this date of service performing the following activities: obtaining history, performing examination, entering orders, documenting, preparing for visit, obtaining / reviewing records, providing counseling and education, independently reviewing study/studies and communicating results.      Eleazar Hall MD  8/3/2021

## 2021-08-13 ENCOUNTER — TELEPHONE (OUTPATIENT)
Dept: CARDIOLOGY | Facility: CLINIC | Age: 61
End: 2021-08-13

## 2021-08-13 DIAGNOSIS — Z95.1 S/P CABG X 1: Primary | ICD-10-CM

## 2021-08-13 NOTE — TELEPHONE ENCOUNTER
I received a call this morning from Brian who is having left sided pleuritic chest pain, started suddenly overnight. Sharp, with breathing, feels similar to when left pleural effusion needed to be drained.     Tried tylenol didn't help.     I suggested a dose of an NSAID to see if this relieves her symptoms and I'd communicate to Dr Hall and his team for a call back during the day to follow up

## 2021-08-13 NOTE — TELEPHONE ENCOUNTER
Had some pleuritic chest pain last PM and this AM  Better after ibuprofen dose  I advised:  Can take prn ibuprofen for the next 2-3 days  Stop clopidogrel for now CXR next week if still some persistent symptom  Continue colchicine  To notify us is symptom returns and worsens

## 2021-08-20 ENCOUNTER — TELEPHONE (OUTPATIENT)
Dept: SCHEDULING | Facility: CLINIC | Age: 61
End: 2021-08-20

## 2021-08-20 NOTE — TELEPHONE ENCOUNTER
"She can be rescheduled to 4:00PM on Monday 8/23   Please let her know, there might be a \"little\" wait, as dr Nix will be seeing a consult first.     Thanks   " Yes

## 2021-08-23 ENCOUNTER — OFFICE VISIT (OUTPATIENT)
Dept: CARDIOTHORACIC SURGERY | Facility: CLINIC | Age: 61
End: 2021-08-23
Payer: COMMERCIAL

## 2021-08-23 VITALS
TEMPERATURE: 96.3 F | HEART RATE: 90 BPM | RESPIRATION RATE: 16 BRPM | BODY MASS INDEX: 20.83 KG/M2 | HEIGHT: 65 IN | DIASTOLIC BLOOD PRESSURE: 80 MMHG | OXYGEN SATURATION: 100 % | WEIGHT: 125 LBS | SYSTOLIC BLOOD PRESSURE: 118 MMHG

## 2021-08-23 DIAGNOSIS — Z95.1 S/P CABG X 1: Primary | ICD-10-CM

## 2021-08-23 PROCEDURE — 99024 POSTOP FOLLOW-UP VISIT: CPT | Performed by: THORACIC SURGERY (CARDIOTHORACIC VASCULAR SURGERY)

## 2021-08-23 NOTE — PROGRESS NOTES
S/P OPERATIVE PROCEDURE 7/16/2021: Robotic-assisted small anterior thoracotomy, single coronary artery bypass, with left internal mammary artery bypass to the anterior descending artery.  She was seen at one week post op for suture removal.     Brian returns after having robotic surgery done on July 16.  She has no complaints.  She is doing every activity that she did preoperatively including work without difficulty.    Vitals:    08/23/21 1547   BP: 118/80   Pulse: 90   Resp: 16   Temp: (!) 35.7 °C (96.3 °F)   SpO2: 100%       By exam her lungs were clear bilaterally, wounds were well-healed and heart regular.  I made no changes in her medicines.    Overall she is doing great and states that she could not possibly do any better!!  She can be discharged from our service    Felix Nix, DO

## 2021-08-24 ENCOUNTER — TREATMENT (OUTPATIENT)
Dept: CARDIAC REHAB | Facility: HOSPITAL | Age: 61
End: 2021-08-24
Attending: PHYSICAL MEDICINE & REHABILITATION
Payer: COMMERCIAL

## 2021-08-24 VITALS — OXYGEN SATURATION: 98 %

## 2021-08-24 DIAGNOSIS — Z95.1 S/P CABG (CORONARY ARTERY BYPASS GRAFT): Primary | ICD-10-CM

## 2021-08-24 PROCEDURE — 93798 PHYS/QHP OP CAR RHAB W/ECG: CPT

## 2021-08-24 NOTE — PROGRESS NOTES
Very pleasant 60 yr old female S/P robotic CABG, pt here today for her initial evaluation, entry 6 minute walk test. Pt is doing well since her surgery, incisions well healed.

## 2021-08-24 NOTE — LETTER
21    Re: Brian Dean    : 1960    CARDIAC REHAB UNDERWAY!    Dear Dr. Schofield    Thank you for referring your patient, Brian Dean, to our cardiac rehabilitation program. The patient has completed the intake process and is about to begin cardiac rehab up to three times per week for both exercise and education. Rehab emphasis will be on risk factor reduction/secondary prevention. We will be working with your patient on:    Hypertension and Stress    And, since medication compliance is an integral part of overall cardiac disease management, we are attaching a copy of the patient's current medication list as she reported it to us. Please note any discrepancies with what you have prescribed.       Current Outpatient Medications:   •  albuterol HFA (VENTOLIN HFA) 90 mcg/actuation inhaler, as needed., Disp: , Rfl:   •  aspirin 81 mg enteric coated tablet, Take 81 mg by mouth daily., Disp: , Rfl:   •  atorvastatin (LIPITOR) 40 mg tablet, Take 40 mg by mouth nightly., Disp: , Rfl:   •  azelastine (ASTELIN) 137 mcg (0.1 %) nasal spray, as needed., Disp: , Rfl:   •  buPROPion XL (WELLBUTRIN XL) 300 mg 24 hr tablet, Take 300 mg by mouth daily., Disp: , Rfl:   •  clopidogreL (PLAVIX) 75 mg tablet, Take 1 tablet (75 mg total) by mouth daily., Disp: 30 tablet, Rfl: 3  •  colchicine (COLCRYS) 0.6 mg tablet, Take 1 tablet (0.6 mg total) by mouth daily., Disp: 30 tablet, Rfl: 1  •  diphenhydrAMINE (BenadryL) 25 mg capsule, Take 1 tablet by mouth nightly.  , Disp: , Rfl:   •  DULoxetine (CYMBALTA) 30 mg capsule, Take 90 mg by mouth daily.  , Disp: , Rfl:   •  eszopiclone (LUNESTA) 3 mg tablet, Take 3 mg by mouth nightly., Disp: , Rfl:   •  LORazepam (ATIVAN) 0.5 mg tablet, Take 0.5 mg by mouth as needed., Disp: , Rfl:   •  magnesium oxide (MAG-OX) 400 mg (241.3 mg magnesium) tablet, Take 1 tablet (400 mg total) by mouth 2 (two) times a day for 173 doses., Disp: 173 tablet, Rfl: 0  •  metoprolol succinate  XL (TOPROL-XL) 12.5 mg, Take 1 split tablet (12.5 mg total) by mouth nightly., Disp: 30 split tablet, Rfl: 11  •  montelukast (SINGULAIR) 10 mg tablet, Take 1 tablet by mouth nightly., Disp: , Rfl:   •  olopatadine (PATANOL) 0.1 % ophthalmic solution, as needed., Disp: , Rfl:   •  triamcinolone (NASACORT) 55 mcg nasal inhaler, as needed., Disp: , Rfl:        Your patient has identified the following personal goals to be achieved as a result of rehab participation:    Activity/Exercise Goal = Patient will incorporate daily exercise plan into lifestyle and Lifestyle/Behavior Goal = Patient will identify and modify risk factors for CAD    We will be in touch with your office periodically to both request and provide information of mutual interest and, of course, we will notify you immediately of any major change in your patient's condition while in rehab.      Please feel free to call or stop by anytime to discuss your patient's progress and to see your patient in rehab action!    Sincerely,    Prachi Simmons RN     [address] [place] [address]  Phone [phone]  Fax  [phone]      DIAGNOSIS: S/P CABG (coronary artery bypass graft)  (primary encounter diagnosis)  Plan: Outpatient Cardiac Rehab Exercise Orders

## 2021-08-24 NOTE — LETTER
September 13, 2021    Leif Schofield III, MD  [address]  [facility]  [place] [address]        Dear Dr. Robertson  Thank you for referring your patient, Brian Dean to our Outpatient Cardiac Rehab Program. The patient has completed 1 visits. However, outpatient rehab is being discontinued at this time due to: Voluntary Dropout: voluntary dropout    Please contact us at [facility] CARDIAC REHABILITATION if you have any questions or if/when it would be appropriate to re-start the patient's rehab program.     Thank you for your continued support of outpatient cardiac rehab.       Sincerely,     [facility] CARDIAC REH[facility]

## 2021-08-29 ENCOUNTER — APPOINTMENT (EMERGENCY)
Dept: RADIOLOGY | Facility: HOSPITAL | Age: 61
End: 2021-08-29
Attending: EMERGENCY MEDICINE
Payer: COMMERCIAL

## 2021-08-29 ENCOUNTER — HOSPITAL ENCOUNTER (EMERGENCY)
Facility: HOSPITAL | Age: 61
Discharge: HOME | End: 2021-08-29
Attending: EMERGENCY MEDICINE
Payer: COMMERCIAL

## 2021-08-29 VITALS
DIASTOLIC BLOOD PRESSURE: 57 MMHG | BODY MASS INDEX: 20.41 KG/M2 | OXYGEN SATURATION: 100 % | SYSTOLIC BLOOD PRESSURE: 115 MMHG | HEART RATE: 79 BPM | HEIGHT: 66 IN | TEMPERATURE: 98.3 F | RESPIRATION RATE: 20 BRPM | WEIGHT: 127 LBS

## 2021-08-29 DIAGNOSIS — R07.9 CHEST PAIN, UNSPECIFIED TYPE: Primary | ICD-10-CM

## 2021-08-29 LAB
ALBUMIN SERPL-MCNC: 3.6 G/DL (ref 3.4–5)
ALP SERPL-CCNC: 110 IU/L (ref 35–126)
ALT SERPL-CCNC: 32 IU/L (ref 11–54)
ANION GAP SERPL CALC-SCNC: 9 MEQ/L (ref 3–15)
AST SERPL-CCNC: 32 IU/L (ref 15–41)
ATRIAL RATE: 91
BASOPHILS # BLD: 0.03 K/UL (ref 0.01–0.1)
BASOPHILS NFR BLD: 0.3 %
BILIRUB SERPL-MCNC: 0.4 MG/DL (ref 0.3–1.2)
BUN SERPL-MCNC: 8 MG/DL (ref 8–20)
CALCIUM SERPL-MCNC: 9.4 MG/DL (ref 8.9–10.3)
CHLORIDE SERPL-SCNC: 99 MEQ/L (ref 98–109)
CO2 SERPL-SCNC: 25 MEQ/L (ref 22–32)
CREAT SERPL-MCNC: 1 MG/DL (ref 0.6–1.1)
DIFFERENTIAL METHOD BLD: ABNORMAL
EOSINOPHIL # BLD: 0.18 K/UL (ref 0.04–0.36)
EOSINOPHIL NFR BLD: 1.8 %
ERYTHROCYTE [DISTWIDTH] IN BLOOD BY AUTOMATED COUNT: 13.7 % (ref 11.7–14.4)
GFR SERPL CREATININE-BSD FRML MDRD: 56 ML/MIN/1.73M*2
GLUCOSE SERPL-MCNC: 106 MG/DL (ref 70–99)
HCT VFR BLDCO AUTO: 36.4 % (ref 35–45)
HGB BLD-MCNC: 11.5 G/DL (ref 11.8–15.7)
IMM GRANULOCYTES # BLD AUTO: 0.02 K/UL (ref 0–0.08)
IMM GRANULOCYTES NFR BLD AUTO: 0.2 %
LYMPHOCYTES # BLD: 2.09 K/UL (ref 1.2–3.5)
LYMPHOCYTES NFR BLD: 20.6 %
MCH RBC QN AUTO: 28.9 PG (ref 28–33.2)
MCHC RBC AUTO-ENTMCNC: 31.6 G/DL (ref 32.2–35.5)
MCV RBC AUTO: 91.5 FL (ref 83–98)
MONOCYTES # BLD: 0.74 K/UL (ref 0.28–0.8)
MONOCYTES NFR BLD: 7.3 %
NEUTROPHILS # BLD: 7.1 K/UL (ref 1.7–7)
NEUTS SEG NFR BLD: 69.8 %
NRBC BLD-RTO: 0 %
P AXIS: 82
PDW BLD AUTO: 10 FL (ref 9.4–12.3)
PLATELET # BLD AUTO: 416 K/UL (ref 150–369)
POTASSIUM SERPL-SCNC: 4.1 MEQ/L (ref 3.6–5.1)
PR INTERVAL: 124
PROT SERPL-MCNC: 6.8 G/DL (ref 6–8.2)
QRS DURATION: 64
QT INTERVAL: 404
QTC CALCULATION(BAZETT): 496
R AXIS: 76
RBC # BLD AUTO: 3.98 M/UL (ref 3.93–5.22)
SODIUM SERPL-SCNC: 133 MEQ/L (ref 136–144)
T WAVE AXIS: 104
TROPONIN I SERPL-MCNC: <0.03 NG/ML
VENTRICULAR RATE: 91
WBC # BLD AUTO: 10.16 K/UL (ref 3.8–10.5)

## 2021-08-29 PROCEDURE — 93005 ELECTROCARDIOGRAM TRACING: CPT

## 2021-08-29 PROCEDURE — 85025 COMPLETE CBC W/AUTO DIFF WBC: CPT | Performed by: EMERGENCY MEDICINE

## 2021-08-29 PROCEDURE — 93005 ELECTROCARDIOGRAM TRACING: CPT | Performed by: EMERGENCY MEDICINE

## 2021-08-29 PROCEDURE — 84484 ASSAY OF TROPONIN QUANT: CPT | Performed by: EMERGENCY MEDICINE

## 2021-08-29 PROCEDURE — 63600105 HC IODINE BASED CONTRAST: Mod: JW | Performed by: EMERGENCY MEDICINE

## 2021-08-29 PROCEDURE — 71045 X-RAY EXAM CHEST 1 VIEW: CPT

## 2021-08-29 PROCEDURE — 99213 OFFICE O/P EST LOW 20 MIN: CPT | Performed by: INTERNAL MEDICINE

## 2021-08-29 PROCEDURE — 93010 ELECTROCARDIOGRAM REPORT: CPT | Performed by: INTERNAL MEDICINE

## 2021-08-29 PROCEDURE — 36415 COLL VENOUS BLD VENIPUNCTURE: CPT | Performed by: EMERGENCY MEDICINE

## 2021-08-29 PROCEDURE — 80053 COMPREHEN METABOLIC PANEL: CPT | Performed by: EMERGENCY MEDICINE

## 2021-08-29 PROCEDURE — 99284 EMERGENCY DEPT VISIT MOD MDM: CPT | Mod: 25

## 2021-08-29 PROCEDURE — 71260 CT THORAX DX C+: CPT | Mod: MG

## 2021-08-29 RX ADMIN — IOHEXOL 61 ML: 350 INJECTION, SOLUTION INTRAVENOUS at 11:30

## 2021-08-29 ASSESSMENT — ENCOUNTER SYMPTOMS
FEVER: 0
NAUSEA: 0
VOMITING: 0
BACK PAIN: 0
FATIGUE: 0
CHEST TIGHTNESS: 0
ABDOMINAL PAIN: 0
LIGHT-HEADEDNESS: 0
DIAPHORESIS: 0
FLANK PAIN: 0
SHORTNESS OF BREATH: 1

## 2021-08-29 NOTE — CONSULTS
CARDIOLOGY INTIAL CONSULT    Date: 8/29/2021  Patient's Primary Care Physician:  Jorge Robertson MD  Referring Physician:  Tim Phillips DO    Subjective     Reason for consult  Chest pain    History of Present Illness  Brian Dean is a 61 y.o. year old female with a past medical history of robotic LIMA to LAD bypass on July 16th 2021, suspected FMD who presents today with chest pain.  The chest pain is right-sided, worse with inspiration and interestingly worse with leaning forward.  It is not particularly exertional.  10 days ago she had similar chest discomfort that responded very well to ibuprofen.  She called the outpatient line this morning and was recommended to present to the emergency department for further evaluation.      Review of Systems  All systems reviewed and negative except as noted in the HPI.    Past History  Past Medical History:   Diagnosis Date   • Allergies    • Anxiety    • Asthma    • Coronary artery disease    • Coronary artery dissection    • Heart disease    • Lipid disorder    • NSTEMI (non-ST elevated myocardial infarction) (CMS/[facility])    • Parvovirus B19 infection      Past Surgical History:   Procedure Laterality Date   • CORONARY ARTERY BYPASS GRAFT  07/16/2021    robotic - single bypass   • REDUCTION MAMMAPLASTY     • REDUCTION MAMMAPLASTY       Family History   Problem Relation Age of Onset   • Hypertension Biological Mother    • Heart disease Biological Brother      Social History     Tobacco Use   • Smoking status: Never Smoker   • Smokeless tobacco: Never Used   Substance Use Topics   • Alcohol use: Never       Allergies  Allergies   Allergen Reactions   • Sulfa (Sulfonamide Antibiotics) Hives and Rash   • Sulfamethoxazole-Trimethoprim Rash       Home Medications  Prior to Admission medications    Medication Sig Start Date End Date Taking? Authorizing Provider   albuterol HFA (VENTOLIN HFA) 90 mcg/actuation inhaler as needed. 1/28/21   Provider, MD Karla  "  aspirin 81 mg enteric coated tablet Take 81 mg by mouth daily.    Karla Whitaker MD   atorvastatin (LIPITOR) 40 mg tablet Take 40 mg by mouth nightly.    Karla Whitaker MD   azelastine (ASTELIN) 137 mcg (0.1 %) nasal spray as needed.    Karla Whitaker MD   buPROPion XL (WELLBUTRIN XL) 300 mg 24 hr tablet Take 300 mg by mouth daily.    Karla Whitaker MD   clopidogreL (PLAVIX) 75 mg tablet Take 1 tablet (75 mg total) by mouth daily. 8/3/21 9/2/21  Eleazar Hall MD   colchicine (COLCRYS) 0.6 mg tablet Take 1 tablet (0.6 mg total) by mouth daily. 8/3/21 9/2/21  Eleazar Hall MD   diphenhydrAMINE (BenadryL) 25 mg capsule Take 1 tablet by mouth nightly.      Karla Whitaker MD   DULoxetine (CYMBALTA) 30 mg capsule Take 90 mg by mouth daily.      Karla Whitaker MD   eszopiclone (LUNESTA) 3 mg tablet Take 3 mg by mouth nightly.    Karla Whitaker MD   LORazepam (ATIVAN) 0.5 mg tablet Take 0.5 mg by mouth as needed. 7/6/21   Karla Whitaker MD   magnesium oxide (MAG-OX) 400 mg (241.3 mg magnesium) tablet Take 1 tablet (400 mg total) by mouth 2 (two) times a day for 173 doses. 7/20/21 10/15/21  Felicia Hicks PA C   metoprolol succinate XL (TOPROL-XL) 12.5 mg Take 1 split tablet (12.5 mg total) by mouth nightly. 8/3/21 9/2/21  Eleazar Hall MD   montelukast (SINGULAIR) 10 mg tablet Take 1 tablet by mouth nightly.    Karla Whitaker MD   olopatadine (PATANOL) 0.1 % ophthalmic solution as needed. 1/28/21   Karla Whitaker MD   triamcinolone (NASACORT) 55 mcg nasal inhaler as needed.    Karla Whitaker MD       Hospital Medications      Objective     Physical Exam  Vitals:    08/29/21 0942   BP: 126/63   Pulse: 92   Resp: 18   Temp: 36.8 °C (98.3 °F)   SpO2: 100%      Ht Readings from Last 1 Encounters:   08/29/21 1.676 m (5' 6\")      Wt Readings from Last 3 Encounters:   08/29/21 57.6 kg (127 lb)   08/23/21 56.7 kg (125 lb)   08/03/21 54.4 kg " (120 lb)    Body mass index is 20.5 kg/m².  No intake or output data in the 24 hours ending 08/29/21 1118    General: Calm, NAD.  Appears stated age.  HEENT: NC, AT.  Conjunctiva pink.  Sclera anicteric.   Neck:  Symmetrical with no masses.  No thyromegaly.  CVS:  RRR, normal S1/S2, no murmur.  JVP 6 cm H2O. no appreciable rub.  Resp: CTA b/l with normal respiratory effort.  GI:  Soft, NT, ND.  No guarding or rigidity.  Normoactive BS  MSK:  No clubbing or cyanosis.  Skin:  No rashes, lesions, or ulcers.  Neurologic:  AAOx3. Follows Commands. GRIFFIN. EOMI. No facial asymmetry.     Relevant data reviewed:    ECG: Sinus rhythm.  Nonspecific ST-T wave changes    Labs  Notable labs are:  Lab Results   Component Value Date    WBC 10.16 08/29/2021    HGB 11.5 (L) 08/29/2021    HCT 36.4 08/29/2021     (H) 08/29/2021     Lab Results   Component Value Date     (L) 08/29/2021    K 4.1 08/29/2021    CL 99 08/29/2021    CO2 25 08/29/2021    BUN 8 08/29/2021    CREATININE 1.0 08/29/2021    GLUCOSE 106 (H) 08/29/2021     Lab Results   Component Value Date    CALCIUM 9.4 08/29/2021     Lab Results   Component Value Date    AST 32 08/29/2021    ALT 32 08/29/2021    ALKPHOS 110 08/29/2021    BILITOT 0.4 08/29/2021    ALBUMIN 3.6 08/29/2021     Lab Results   Component Value Date    TROPONINI <0.03 08/29/2021       Imaging Studies  Cardiac Imaging    TRANSTHORACIC ECHO (TTE) COMPLETE 07/15/2021    Interpretation Summary  Technically difficult study.    1. Normal-sized left ventricle. Normal left ventricular wall thickness. Preserved left ventricular systolic function. Estimated EF 60% on 3D imaging. No regional wall motion abnormalities. There is impaired relaxation. Normal global longitudinal strain (-19.1% on Fernandez Epiq).  2. Normal-sized left atrium. (left atrial volume indexed by body surface area 24 ml/m2).  3. Mitral valve leaflet thickening. Mild mitral annular calcification. Trace mitral valve regurgitation.  4.  Three-cusped aortic valve. Sclerotic aortic valve leaflets. No aortic valve regurgitation.  5. Aortic root and ascending aorta are normal-sized.  6. Normal-sized right ventricle. Normal right ventricular systolic function. (Right ventricular S' 12 cm/s).  7. Normal-sized right atrium.  8. Tricuspid valve structure is normal. Trace tricuspid valve regurgitation with calculated right ventricular systolic pressure of 26 mmHg.  9. Pulmonic valve not well seen.  10. Inferior vena cava is <2.1 cm and collapses <50% during inspiration. (estimated right atrial pressure is 8 mmHg).  11. There is a trivial pericardial effusion.  12. No prior TTE to compare.      Assessment & Plan     Assessment     1. Noncardiac chest pain  2. S/p robotic LIMA-LAD CABG 7/16/2021  3. Suspected fibromuscular dysplasia    Presentation is more consistent with a musculoskeletal etiology as opposed to anginal pain.  Pericarditis is less likely in the absence of clinical criteria.  Her current symptoms are exceedingly different than those that led to her bypass surgery.  It is reassuring that she had similar symptoms 10 days ago that responded nicely to ibuprofen.      Recommendations  -Would treat with NSAIDs for the shortest duration possible using symptom resolution as an endpoint.  -Can obtain 1 more troponin to ensure no change.  -Continue home colchicine.      Sign  Bobby Maldonado MD  8/29/2021  11:18 AM

## 2021-08-29 NOTE — DISCHARGE INSTRUCTIONS
You were seen for chest pain 6 weeks after your CABG surgery. This may be inflammatory in nature get back on the Motrin dosing as directed by cardiology. CAT scan was negative for any blood clot. Please follow with Dr. Hall as planned.

## 2021-08-29 NOTE — ED PROVIDER NOTES
Emergency Medicine Note  HPI   HISTORY OF PRESENT ILLNESS     61-year-old female status post CABG surgery 6 weeks ago presents with chest discomfort since yesterday across her upper chest somewhat pleuritic is moderate in intensity nonradiating without associated nausea vomiting or sweats.  Pain is been because it seems to be worse leaning forward otherwise no other relieving factors.  She has had some mild shortness of breath with it but no fever or cough.            Patient History   PAST HISTORY     Reviewed from Nursing Triage:      Past Medical History:   Diagnosis Date   • Allergies    • Anxiety    • Asthma    • Coronary artery disease    • Coronary artery dissection    • Heart disease    • Lipid disorder    • NSTEMI (non-ST elevated myocardial infarction) (CMS/[facility])    • Parvovirus B19 infection        Past Surgical History:   Procedure Laterality Date   • CORONARY ARTERY BYPASS GRAFT  07/16/2021    robotic - single bypass   • REDUCTION MAMMAPLASTY     • REDUCTION MAMMAPLASTY         Family History   Problem Relation Age of Onset   • Hypertension Biological Mother    • Heart disease Biological Brother        Social History     Tobacco Use   • Smoking status: Never Smoker   • Smokeless tobacco: Never Used   Vaping Use   • Vaping Use: Never used   Substance Use Topics   • Alcohol use: Never   • Drug use: Never         Review of Systems   REVIEW OF SYSTEMS     Review of Systems   Constitutional: Negative for diaphoresis, fatigue and fever.   HENT: Negative for congestion.    Respiratory: Positive for shortness of breath. Negative for chest tightness.    Cardiovascular: Positive for chest pain.   Gastrointestinal: Negative for abdominal pain, nausea and vomiting.   Genitourinary: Negative for flank pain.   Musculoskeletal: Negative for back pain.   Neurological: Negative for light-headedness.   Psychiatric/Behavioral: Negative for behavioral problems.         VITALS     ED Vitals    Date/Time Temp Pulse Resp BP  SpO2 [facility]   08/29/21 1313 -- 79 20 115/57 100 % [facility]   08/29/21 1121 -- 83 20 131/63 100 % [facility]   08/29/21 1106 -- 74 18 122/56 100 % [facility]   08/29/21 0942 36.8 °C (98.3 °F) 92 18 126/63 100 % [facility]        Pulse Ox %: 100 % (08/29/21 1101)  Pulse Ox Interpretation: Normal (08/29/21 1101)  Heart Rate: 92 (08/29/21 1101)  Rhythm Strip Interpretation: Normal Sinus Rhythm (08/29/21 1101)     Physical Exam   PHYSICAL EXAM     Physical Exam  Vitals and nursing note reviewed.   Constitutional:       General: She is not in acute distress.     Appearance: She is well-developed.   HENT:      Head: Normocephalic.   Eyes:      Conjunctiva/sclera: Conjunctivae normal.   Cardiovascular:      Rate and Rhythm: Normal rate and regular rhythm.      Heart sounds: No murmur heard.     Pulmonary:      Effort: Pulmonary effort is normal. No respiratory distress.      Breath sounds: Normal breath sounds.   Chest:      Chest wall: No tenderness.   Abdominal:      Palpations: Abdomen is soft.      Tenderness: There is no abdominal tenderness.   Musculoskeletal:      Cervical back: Neck supple.   Skin:     General: Skin is warm and dry.   Neurological:      General: No focal deficit present.      Mental Status: She is alert.           PROCEDURES     Procedures     DATA     Results     Procedure Component Value Units Date/Time    Comprehensive metabolic panel [[id number]]  (Abnormal) Collected: 08/29/21 0940    Specimen: Blood, Venous Updated: 08/29/21 1019     Sodium 133 mEQ/L      Potassium 4.1 mEQ/L      Comment: Results obtained on plasma. Plasma Potassium values may be up to 0.4 mEQ/L less than serum values. The differences may be greater for patients with high platelet or white cell counts.        Chloride 99 mEQ/L      CO2 25 mEQ/L      BUN 8 mg/dL      Creatinine 1.0 mg/dL      Glucose 106 mg/dL      Calcium 9.4 mg/dL      AST (SGOT) 32 IU/L      ALT (SGPT) 32 IU/L      Alkaline Phosphatase 110 IU/L      Total Protein 6.8 g/dL      Comment:  Test performed on plasma which typically contains approximately 0.4 g/dL more protein than serum.        Albumin 3.6 g/dL      Bilirubin, Total 0.4 mg/dL      eGFR 56.0 mL/min/1.73m*2      Anion Gap 9 mEQ/L     Troponin I [[id number]]  (Normal) Collected: 08/29/21 0940    Specimen: Blood, Venous Updated: 08/29/21 1012     Troponin I <0.03 ng/mL     CBC and differential [[id number]]  (Abnormal) Collected: 08/29/21 0940    Specimen: Blood, Venous Updated: 08/29/21 0956     WBC 10.16 K/uL      RBC 3.98 M/uL      Hemoglobin 11.5 g/dL      Hematocrit 36.4 %      MCV 91.5 fL      MCH 28.9 pg      MCHC 31.6 g/dL      RDW 13.7 %      Platelets 416 K/uL      MPV 10.0 fL      Differential Type Auto     nRBC 0.0 %      Immature Granulocytes 0.2 %      Neutrophils 69.8 %      Lymphocytes 20.6 %      Monocytes 7.3 %      Eosinophils 1.8 %      Basophils 0.3 %      Immature Granulocytes, Absolute 0.02 K/uL      Neutrophils, Absolute 7.10 K/uL      Lymphocytes, Absolute 2.09 K/uL      Monocytes, Absolute 0.74 K/uL      Eosinophils, Absolute 0.18 K/uL      Basophils, Absolute 0.03 K/uL     [place] [facility] Draw Panel [[id number]] Collected: 08/29/21 0941    Specimen: Blood, Venous Updated: 08/29/21 0946    Narrative:      The following orders were created for panel order [place] [facility] Draw Panel.  Procedure                               Abnormality         Status                     ---------                               -----------         ------                     [employer] LT BLUE[[id number]]                                  In process                   Please view results for these tests on the individual orders.    [facility] BLUE [[id number]] Collected: 08/29/21 0941    Specimen: Blood, Venous Updated: 08/29/21 0946          Imaging Results          CT CHEST PULMONARY EMBOLISM WITH IV CONTRAST (Final result)  Result time 08/29/21 12:08:20    Final result                 Impression:    IMPRESSION:  No evidence of pulmonary embolism.  Small  mediastinal/right hilar lymph nodes of uncertain significance.             Narrative:        CLINICAL HISTORY: Shortness of breath    CT DOSE:  One or more dose reduction techniques (e.g. automated exposure  control, adjustment of the mA and/or kV according to patient size, use of  iterative reconstruction technique) utilized for this examination.    IV and oral contrast: Patient received 100 cc of Omnipaque 350 intravenously.    Comparison studies: None    COMMENT:    Lung parenchyma: There is no filling defect or abrupt cutoff of pulmonary artery  branches to suggest acute pulmonary embolus. There is right basal  atelectasis/scarring.    Mediastinum: Mildly prominent small lymph nodes of uncertain significance are  seen. There is a 1.6 x 0.9 cm prevascular lymph node seen on image 67. On image  68 there is a 1.0 x 0.8 cm left paratracheal and 1.0 x 0.5 cm pretracheal lymph  node. Other smaller lymph nodes are also seen.    Ivonne: 1 x 0.8 cm right hilar lymph node    Heart and pericardium: Trace pericardial effusion. Normal cardiac size. Chest  wall and pleura: No large effusion or pneumothorax    Axilla: No lymphadenopathy    Abdomen:  Nothing abnormal is seen in the visualized portions of the abdomen    Other:                               X-RAY CHEST 1 VIEW (Final result)  Result time 08/29/21 12:03:25    Final result                 Impression:    IMPRESSION:  No active disease on the chest..             Narrative:      CLINICAL HISTORY: chest pain & sob    COMMENT: A single AP radiograph of the chest is obtained . Comparison is made to  prior exam of 7/20/2021. The cardiomediastinal silhouette is unremarkable. The  lungs are grossly clear. There is no appreciable effusion.No pneumothorax  present. Biapical scarring..                                ECG 12 lead   Final Result          Scoring tools                                 ED Course & MDM   MDM / ED COURSE and CLINICAL IMPRESSIONS     MDM  Number of  Diagnoses or Management Options  Chest pain, unspecified type  Diagnosis management comments: 61-year-old female presents with chest pain since yesterday.    Frontal diagnosis includes pulmonary embolism, pneumothorax, inflammatory condition status post CABG surgery, pericarditis, pleural effusion, anxiety, doubt ACS.       Amount and/or Complexity of Data Reviewed  Clinical lab tests: reviewed and ordered  Tests in the radiology section of CPT®: ordered and reviewed  Tests in the medicine section of CPT®: ordered and reviewed  Discuss the patient with other providers: yes    Risk of Complications, Morbidity, and/or Mortality  Presenting problems: high  Diagnostic procedures: high  Management options: high        ED Course as of Aug 29 1424   Sun Aug 29, 2021   1008 Calling CT and Gen Cardio for eval.     [SC]   1303 D/W Dr. Wright/ Cariology  Pt can go  Maybe inflammatory  Resume NSAID  Follow with Dr Hall as planned. CT neg for PE    [SC]      ED Course User Index  [SC] Tim Phillips DO         Clinical Impressions as of Aug 29 1424   Chest pain, unspecified type            Tim Phillips DO  08/29/21 1424

## 2021-09-08 PROBLEM — J90 PLEURAL EFFUSION, LEFT: Status: RESOLVED | Noted: 2021-07-20 | Resolved: 2021-09-08

## 2021-09-08 PROBLEM — I21.4 NSTEMI (NON-ST ELEVATED MYOCARDIAL INFARCTION) (CMS/HCC): Status: RESOLVED | Noted: 2021-07-09 | Resolved: 2021-09-08

## 2021-09-08 PROBLEM — R06.09 DYSPNEA ON EXERTION: Status: RESOLVED | Noted: 2021-07-09 | Resolved: 2021-09-08

## 2021-10-07 ENCOUNTER — TELEPHONE (OUTPATIENT)
Dept: CARDIOLOGY | Facility: CLINIC | Age: 61
End: 2021-10-07

## 2021-10-07 RX ORDER — COLCHICINE 0.6 MG/1
0.6 TABLET ORAL DAILY
Qty: 30 TABLET | Refills: 3 | Status: SHIPPED | OUTPATIENT
Start: 2021-10-07 | End: 2021-10-29 | Stop reason: ALTCHOICE

## 2021-10-11 NOTE — TELEPHONE ENCOUNTER
Prior Auth Request - Medication     Name of caller: Brian Dean    Relationship to patient: Self    Name of patient: Brian Dean    Name of physician: MISAEL Pacheco    Medication name: Colycrys 0.6 mg    Rx BIN number: [id number]    Rx Group number: [id number]    Rx PCN number: [id number]    Pharmacy information: [facility]    Name of insurance and phone number: [phone]    Insurance ID#: [id number]

## 2021-10-11 NOTE — TELEPHONE ENCOUNTER
Jose Raul- could you send in PA for colchicine.  She is taking for pleuritic chest pain s/p CABG x 1.  thanks

## 2021-10-12 NOTE — TELEPHONE ENCOUNTER
I called and left .  Her Rx plan did not approve medication.  I briefly checked cost of colchicine and would be @ $30.  She will not be on this medication long-term.  Has f/u with us on 10/29.  Asked to call back to discuss.

## 2021-10-29 ENCOUNTER — OFFICE VISIT (OUTPATIENT)
Dept: CARDIOLOGY | Facility: CLINIC | Age: 61
End: 2021-10-29
Payer: COMMERCIAL

## 2021-10-29 VITALS
DIASTOLIC BLOOD PRESSURE: 64 MMHG | OXYGEN SATURATION: 99 % | SYSTOLIC BLOOD PRESSURE: 112 MMHG | HEIGHT: 65 IN | RESPIRATION RATE: 20 BRPM | BODY MASS INDEX: 20.66 KG/M2 | WEIGHT: 124 LBS | HEART RATE: 84 BPM

## 2021-10-29 DIAGNOSIS — E83.42 HYPOMAGNESEMIA: ICD-10-CM

## 2021-10-29 DIAGNOSIS — I25.42 CORONARY ARTERY DISSECTION: ICD-10-CM

## 2021-10-29 DIAGNOSIS — I24.1 POSTMYOCARDIAL INFARCTION SYNDROME (CMS/HCC): ICD-10-CM

## 2021-10-29 DIAGNOSIS — I21.4 NSTEMI (NON-ST ELEVATED MYOCARDIAL INFARCTION) (CMS/HCC): ICD-10-CM

## 2021-10-29 DIAGNOSIS — R00.2 PALPITATIONS: ICD-10-CM

## 2021-10-29 DIAGNOSIS — R73.03 PREDIABETES: ICD-10-CM

## 2021-10-29 DIAGNOSIS — R06.09 DOE (DYSPNEA ON EXERTION): Primary | ICD-10-CM

## 2021-10-29 DIAGNOSIS — Z95.1 S/P CABG X 1: ICD-10-CM

## 2021-10-29 DIAGNOSIS — E78.5 DYSLIPIDEMIA: ICD-10-CM

## 2021-10-29 PROBLEM — I31.9 PERICARDITIS: Status: ACTIVE | Noted: 2021-10-29

## 2021-10-29 PROBLEM — R09.89 PALPABLE ABDOMINAL AORTA: Status: RESOLVED | Noted: 2021-07-09 | Resolved: 2021-10-29

## 2021-10-29 PROCEDURE — 99214 OFFICE O/P EST MOD 30 MIN: CPT | Performed by: INTERNAL MEDICINE

## 2021-10-29 PROCEDURE — 93000 ELECTROCARDIOGRAM COMPLETE: CPT | Performed by: INTERNAL MEDICINE

## 2021-10-29 RX ORDER — METOPROLOL SUCCINATE 25 MG/1
12.5 TABLET, EXTENDED RELEASE ORAL DAILY
Qty: 45 TABLET | Refills: 3 | Status: SHIPPED | OUTPATIENT
Start: 2021-10-29 | End: 2022-03-08 | Stop reason: SDUPTHER

## 2021-10-29 RX ORDER — ATORVASTATIN CALCIUM 40 MG/1
40 TABLET, FILM COATED ORAL NIGHTLY
Qty: 90 TABLET | Refills: 3 | Status: SHIPPED | OUTPATIENT
Start: 2021-10-29 | End: 2022-03-08 | Stop reason: SDUPTHER

## 2021-10-29 RX ORDER — LANOLIN ALCOHOL/MO/W.PET/CERES
400 CREAM (GRAM) TOPICAL 2 TIMES DAILY
Qty: 180 TABLET | Refills: 3 | Status: SHIPPED | OUTPATIENT
Start: 2021-10-29 | End: 2022-03-08 | Stop reason: SDUPTHER

## 2021-10-29 ASSESSMENT — ENCOUNTER SYMPTOMS
WEIGHT LOSS: 1
DYSPNEA ON EXERTION: 0
BACK PAIN: 0
DEPRESSION: 0
HEARTBURN: 0
NERVOUS/ANXIOUS: 0
WEIGHT GAIN: 0
COUGH: 0
PALPITATIONS: 0
DIAPHORESIS: 0
SHORTNESS OF BREATH: 0
CLAUDICATION: 0
NAUSEA: 0
DIZZINESS: 0
SNORING: 0

## 2021-10-29 NOTE — PROGRESS NOTES
Cardiology Note          HPI   Brian Dean is a 61 y.o. female was last seen on 8/3/21.   She was in ED on 8/29 with pleuritic chest pain- CT showed trace pericardial effusion and no pulmonary embolus.  It was thought to be due to postop pericarditis. She took ibuprofen for a few days with resolution of the symptom.  This has not recurred.   She walks 2 miles daily and lift some 5 lb weights.   Patient  denies chest distress, MCGUIRE, orthopnea, PND, edema, palpitations, syncope or near syncope.         Past Medical History:   Diagnosis Date   • Allergies    • Anxiety    • Asthma    • Coronary artery disease    • Coronary artery dissection    • Heart disease    • Lipid disorder    • NSTEMI (non-ST elevated myocardial infarction) (CMS/[facility])    • Parvovirus B19 infection        Past Surgical History:   Procedure Laterality Date   • CORONARY ARTERY BYPASS GRAFT  07/16/2021    robotic - single bypass   • REDUCTION MAMMAPLASTY     • REDUCTION MAMMAPLASTY         Social History     Tobacco Use   • Smoking status: Never Smoker   • Smokeless tobacco: Never Used   Vaping Use   • Vaping Use: Never used   Substance Use Topics   • Alcohol use: Never   • Drug use: Never       Family History   Problem Relation Age of Onset   • Hypertension Biological Mother    • Heart disease Biological Brother        Allergies  Sulfa (sulfonamide antibiotics) and Sulfamethoxazole-trimethoprim    Current Outpatient Medications   Medication Sig Dispense Refill   • albuterol HFA (VENTOLIN HFA) 90 mcg/actuation inhaler as needed.     • aspirin 81 mg enteric coated tablet Take 81 mg by mouth daily.     • atorvastatin (LIPITOR) 40 mg tablet Take 1 tablet (40 mg total) by mouth nightly. 90 tablet 3   • azelastine (ASTELIN) 137 mcg (0.1 %) nasal spray as needed.     • buPROPion XL (WELLBUTRIN XL) 300 mg 24 hr tablet Take 300 mg by mouth daily.     • diphenhydrAMINE (BenadryL) 25 mg capsule Take 1 tablet by mouth nightly.       • DULoxetine (CYMBALTA)  30 mg capsule Take 90 mg by mouth daily.       • eszopiclone (LUNESTA) 3 mg tablet Take 3 mg by mouth nightly.     • LORazepam (ATIVAN) 0.5 mg tablet Take 0.5 mg by mouth as needed.     • montelukast (SINGULAIR) 10 mg tablet Take 1 tablet by mouth nightly.     • olopatadine (PATANOL) 0.1 % ophthalmic solution as needed.     • triamcinolone (NASACORT) 55 mcg nasal inhaler as needed.     • magnesium oxide 400 mg (241.3 mg magnesium) tablet Take 1 tablet (400 mg total) by mouth 2 (two) times a day. 180 tablet 3   • metoprolol succinate XL 25 mg 24 hr tablet Take 0.5 tablets (12.5 mg total) by mouth daily. 45 tablet 3     No current facility-administered medications for this visit.         Review of Systems   Constitutional: Positive for weight loss. Negative for diaphoresis and weight gain.   Eyes: Negative for visual disturbance.   Cardiovascular: Negative for chest pain, claudication, dyspnea on exertion, leg swelling and palpitations.   Respiratory: Negative for cough, shortness of breath and snoring.    Skin: Negative for rash.   Musculoskeletal: Negative for arthritis and back pain.   Gastrointestinal: Negative for heartburn and nausea.   Genitourinary: Negative for nocturia.   Neurological: Negative for dizziness.   Psychiatric/Behavioral: Negative for depression. The patient is not nervous/anxious.        Objective     Vitals:    10/29/21 1027   BP: 112/64   Pulse: 84   Resp: 20   SpO2: 99%     Wt Readings from Last 3 Encounters:   10/29/21 56.2 kg (124 lb)   08/29/21 57.6 kg (127 lb)   08/23/21 56.7 kg (125 lb)       Physical Exam  Constitutional:       Appearance: She is well-developed.   HENT:      Head: Normocephalic.   Eyes:      General:         Right eye: No discharge.         Left eye: No discharge.   Neck:      Vascular: No JVD.   Cardiovascular:      Rate and Rhythm: Normal rate and regular rhythm.      Heart sounds: Normal heart sounds.   Pulmonary:      Breath sounds: Normal breath sounds.    Abdominal:      General: Bowel sounds are normal.      Palpations: Abdomen is soft.   Musculoskeletal:         General: No deformity.   Skin:     Findings: No rash.   Neurological:      Mental Status: She is alert and oriented to person, place, and time.   Psychiatric:         Behavior: Behavior normal.         Lab Results   Component Value Date    WBC 10.16 08/29/2021    HGB 11.5 (L) 08/29/2021     (H) 08/29/2021    CHOL 150 07/14/2021    TRIG 73 07/14/2021    HDL 77 07/14/2021    ALT 32 08/29/2021    AST 32 08/29/2021     (L) 08/29/2021    K 4.1 08/29/2021    CREATININE 1.0 08/29/2021    TSH 2.33 07/14/2021    INR 0.9 07/19/2021    HGBA1C 5.5 07/14/2021        Imaging 8/29 CT of chest see above     ECG sinus, age indeterminate anteroseptal infarction          Problem List Items Addressed This Visit        High    MCGUIRE (dyspnea on exertion) - Primary     7/14/2021 right/left heart catheterization showed 70% mid LAD focal stenosis just distal to a large diagonal vessel.  The borders of the lesion appear smooth and the lack of other angiographic disease suggests FMD or intramural hematoma as etiology rather than atherosclerotic disease. Low -normal LVEDP  Right heart catheterization showed low right and left heart filling pressures (RA 1, PA 30/8 (15), and PCW 3 mm Hg).  Cardiac output and index of 6.32 L/min and 3.9 L/min/m2 respectively.      She underwent CABG x1, LIMA to LAD, on 7/16/2021.     7/15/2021 echo with LVEF of 60%, no regional wall motion abnormalities, impaired relaxation with trace tricuspid regurgitation and estimated RVSP of 26 mmHg.   No longer has MCGUIRE          Relevant Orders    ECG 12 LEAD-OFFICE PERFORMED (Completed)    S/P CABG x 1     7/14/21 R/L heart catheterization showed 70% mid LAD focal stenosis just distal to a large diagonal vessel.  The borders of the lesion appear smooth and the lack of other angiographic disease suggests FMD or intramural hematoma as etiology rather  than atherosclerotic disease.  She underwent robotic bypass x1, LIMA to LAD, on 7/16/2021.    Patient was seen in the ED on 8/29 for complaints of chest pain.  CT of the chest was negative for PE and showed just trace pericardial effusion.  Troponin level negative x1.  She had resumed her NSAID and colchicine. She was treated for presumed pericarditis.     I told her she could stop both clopidogrel and colchicine.             Medium    NSTEMI (non-ST elevated myocardial infarction) (CMS/HCC)     Non-STEMI in 2005 at the time of her coronary artery dissection.         Relevant Medications    atorvastatin (LIPITOR) 40 mg tablet    metoprolol succinate XL 25 mg 24 hr tablet    Coronary artery dissection     12/27/ 2005, she had a myocardial infarction due to a coronary artery dissection. Cath report said a second diagonal branch was the culprit vessel.  No PCI was performed. She presented at that time with severe jaw pain that then radiated into the chest and left arm. It was associated with severe nausea and some dyspnea.  I had previously discussed CTA or MRA of the cerebral and abdominal vessels to assess for FMD.      7/14/21 R/L heart catheterization showed 70% mid LAD focal stenosis just distal to a large diagonal vessel.  The borders of the lesion appear smooth and the lack of other angiographic disease suggests FMD or intramural hematoma as etiology rather than atherosclerotic disease.     7/14/2021 carotid ultrasound showed no hemodynamically significant stenosis bilaterally.     I had previously discussed with her and her    the association between SCAD and FMD  I had advised CTA or MRA of head and neck, abdomen and pelvis at some point to assess for FMD    She has an appt with Dr. Ricardo Otero at [place] in [place] for evaluation for FMD         Relevant Medications    atorvastatin (LIPITOR) 40 mg tablet    metoprolol succinate XL 25 mg 24 hr tablet    Palpitations     Potassium level of 4.1 on 8/29  labs and magnesium 1.8 on 7/20 labs.  Palpitations quiescent          Pericarditis     She was in ED on 8/29 with pleuritic chest pain- CT showed trace pericardial effusion and no pulmonary embolus.  It was thought to be due to postop pericarditis. She took ibuprofen for a few days with resolution of the symptom.  This has not recurred.             Low    Dyslipidemia     Total cholesterol 150, triglycerides 73, HDL 77 and LDL 58 on July lipids.  Lipids well controlled on present regimen  Continue present medications and low fat, low cholesterol diet  For fasting labs before return           Relevant Orders    CBC and Differential    CK, Total    Comprehensive metabolic panel    Lipid panel    TSH w reflex FT4    RESOLVED: Prediabetes    Hypomagnesemia     Takes supplement         Relevant Orders    Magnesium           She will return in 2022.     Eleazar Hall MD  10/29/2021

## 2021-10-29 NOTE — LETTER
October 29, 2021     Jorge Robertson MD  [address]  [id number]  [facility] [id number]    Patient: Brian Dean  YOB: 1960  Date of Visit: 10/29/2021      Dear Dr. Robertson:    Thank you for referring Brian Dean to me for evaluation. Below are my notes for this consultation.    If you have questions, please do not hesitate to call me. I look forward to following your patient along with you.         Sincerely,        Eleazar Hall MD        CC: Brian Alcantaralexandriakanchan  Eleazar Hall MD  10/29/2021 11:00 AM  Signed     Cardiology Note          HPI   Brian Dean is a 61 y.o. female was last seen on 8/3/21.   She was in ED on 8/29 with pleuritic chest pain- CT showed trace pericardial effusion and no pulmonary embolus.  It was thought to be due to postop pericarditis. She took ibuprofen for a few days with resolution of the symptom.  This has not recurred.   She walks 2 miles daily and lift some 5 lb weights.   Patient  denies chest distress, MCGUIRE, orthopnea, PND, edema, palpitations, syncope or near syncope.         Past Medical History:   Diagnosis Date   • Allergies    • Anxiety    • Asthma    • Coronary artery disease    • Coronary artery dissection    • Heart disease    • Lipid disorder    • NSTEMI (non-ST elevated myocardial infarction) (CMS/[facility])    • Parvovirus B19 infection        Past Surgical History:   Procedure Laterality Date   • CORONARY ARTERY BYPASS GRAFT  07/16/2021    robotic - single bypass   • REDUCTION MAMMAPLASTY     • REDUCTION MAMMAPLASTY         Social History     Tobacco Use   • Smoking status: Never Smoker   • Smokeless tobacco: Never Used   Vaping Use   • Vaping Use: Never used   Substance Use Topics   • Alcohol use: Never   • Drug use: Never       Family History   Problem Relation Age of Onset   • Hypertension Biological Mother    • Heart disease Biological Brother        Allergies  Sulfa (sulfonamide antibiotics) and Sulfamethoxazole-trimethoprim    Current  Outpatient Medications   Medication Sig Dispense Refill   • albuterol HFA (VENTOLIN HFA) 90 mcg/actuation inhaler as needed.     • aspirin 81 mg enteric coated tablet Take 81 mg by mouth daily.     • atorvastatin (LIPITOR) 40 mg tablet Take 1 tablet (40 mg total) by mouth nightly. 90 tablet 3   • azelastine (ASTELIN) 137 mcg (0.1 %) nasal spray as needed.     • buPROPion XL (WELLBUTRIN XL) 300 mg 24 hr tablet Take 300 mg by mouth daily.     • diphenhydrAMINE (BenadryL) 25 mg capsule Take 1 tablet by mouth nightly.       • DULoxetine (CYMBALTA) 30 mg capsule Take 90 mg by mouth daily.       • eszopiclone (LUNESTA) 3 mg tablet Take 3 mg by mouth nightly.     • LORazepam (ATIVAN) 0.5 mg tablet Take 0.5 mg by mouth as needed.     • montelukast (SINGULAIR) 10 mg tablet Take 1 tablet by mouth nightly.     • olopatadine (PATANOL) 0.1 % ophthalmic solution as needed.     • triamcinolone (NASACORT) 55 mcg nasal inhaler as needed.     • magnesium oxide 400 mg (241.3 mg magnesium) tablet Take 1 tablet (400 mg total) by mouth 2 (two) times a day. 180 tablet 3   • metoprolol succinate XL 25 mg 24 hr tablet Take 0.5 tablets (12.5 mg total) by mouth daily. 45 tablet 3     No current facility-administered medications for this visit.         Review of Systems   Constitutional: Positive for weight loss. Negative for diaphoresis and weight gain.   Eyes: Negative for visual disturbance.   Cardiovascular: Negative for chest pain, claudication, dyspnea on exertion, leg swelling and palpitations.   Respiratory: Negative for cough, shortness of breath and snoring.    Skin: Negative for rash.   Musculoskeletal: Negative for arthritis and back pain.   Gastrointestinal: Negative for heartburn and nausea.   Genitourinary: Negative for nocturia.   Neurological: Negative for dizziness.   Psychiatric/Behavioral: Negative for depression. The patient is not nervous/anxious.        Objective     Vitals:    10/29/21 1027   BP: 112/64   Pulse: 84    Resp: 20   SpO2: 99%     Wt Readings from Last 3 Encounters:   10/29/21 56.2 kg (124 lb)   08/29/21 57.6 kg (127 lb)   08/23/21 56.7 kg (125 lb)       Physical Exam  Constitutional:       Appearance: She is well-developed.   HENT:      Head: Normocephalic.   Eyes:      General:         Right eye: No discharge.         Left eye: No discharge.   Neck:      Vascular: No JVD.   Cardiovascular:      Rate and Rhythm: Normal rate and regular rhythm.      Heart sounds: Normal heart sounds.   Pulmonary:      Breath sounds: Normal breath sounds.   Abdominal:      General: Bowel sounds are normal.      Palpations: Abdomen is soft.   Musculoskeletal:         General: No deformity.   Skin:     Findings: No rash.   Neurological:      Mental Status: She is alert and oriented to person, place, and time.   Psychiatric:         Behavior: Behavior normal.         Lab Results   Component Value Date    WBC 10.16 08/29/2021    HGB 11.5 (L) 08/29/2021     (H) 08/29/2021    CHOL 150 07/14/2021    TRIG 73 07/14/2021    HDL 77 07/14/2021    ALT 32 08/29/2021    AST 32 08/29/2021     (L) 08/29/2021    K 4.1 08/29/2021    CREATININE 1.0 08/29/2021    TSH 2.33 07/14/2021    INR 0.9 07/19/2021    HGBA1C 5.5 07/14/2021        Imaging 8/29 CT of chest see above     ECG sinus, age indeterminate anteroseptal infarction          Problem List Items Addressed This Visit        High    MCGUIRE (dyspnea on exertion) - Primary     7/14/2021 right/left heart catheterization showed 70% mid LAD focal stenosis just distal to a large diagonal vessel.  The borders of the lesion appear smooth and the lack of other angiographic disease suggests FMD or intramural hematoma as etiology rather than atherosclerotic disease. Low -normal LVEDP  Right heart catheterization showed low right and left heart filling pressures (RA 1, PA 30/8 (15), and PCW 3 mm Hg).  Cardiac output and index of 6.32 L/min and 3.9 L/min/m2 respectively.      She underwent CABG x1,  HEARD to LAD, on 7/16/2021.     7/15/2021 echo with LVEF of 60%, no regional wall motion abnormalities, impaired relaxation with trace tricuspid regurgitation and estimated RVSP of 26 mmHg.   No longer has MCGUIRE          Relevant Orders    ECG 12 LEAD-OFFICE PERFORMED (Completed)    S/P CABG x 1     7/14/21 R/L heart catheterization showed 70% mid LAD focal stenosis just distal to a large diagonal vessel.  The borders of the lesion appear smooth and the lack of other angiographic disease suggests FMD or intramural hematoma as etiology rather than atherosclerotic disease.  She underwent robotic bypass x1, LIMA to LAD, on 7/16/2021.    Patient was seen in the ED on 8/29 for complaints of chest pain.  CT of the chest was negative for PE and showed just trace pericardial effusion.  Troponin level negative x1.  She had resumed her NSAID and colchicine. She was treated for presumed pericarditis.     I told her she could stop both clopidogrel and colchicine.             Medium    NSTEMI (non-ST elevated myocardial infarction) (CMS/HCC)     Non-STEMI in 2005 at the time of her coronary artery dissection.         Relevant Medications    atorvastatin (LIPITOR) 40 mg tablet    metoprolol succinate XL 25 mg 24 hr tablet    Coronary artery dissection     12/27/ 2005, she had a myocardial infarction due to a coronary artery dissection. Cath report said a second diagonal branch was the culprit vessel.  No PCI was performed. She presented at that time with severe jaw pain that then radiated into the chest and left arm. It was associated with severe nausea and some dyspnea.  I had previously discussed CTA or MRA of the cerebral and abdominal vessels to assess for FMD.      7/14/21 R/L heart catheterization showed 70% mid LAD focal stenosis just distal to a large diagonal vessel.  The borders of the lesion appear smooth and the lack of other angiographic disease suggests FMD or intramural hematoma as etiology rather than atherosclerotic  disease.     7/14/2021 carotid ultrasound showed no hemodynamically significant stenosis bilaterally.     I had previously discussed with her and her    the association between SCAD and FMD  I had advised CTA or MRA of head and neck, abdomen and pelvis at some point to assess for FMD    She has an appt with Dr. Ricardo Otero at [place] in [place] for evaluation for FMD         Relevant Medications    atorvastatin (LIPITOR) 40 mg tablet    metoprolol succinate XL 25 mg 24 hr tablet    Palpitations     Potassium level of 4.1 on 8/29 labs and magnesium 1.8 on 7/20 labs.  Palpitations quiescent          Pericarditis     She was in ED on 8/29 with pleuritic chest pain- CT showed trace pericardial effusion and no pulmonary embolus.  It was thought to be due to postop pericarditis. She took ibuprofen for a few days with resolution of the symptom.  This has not recurred.             Low    Dyslipidemia     Total cholesterol 150, triglycerides 73, HDL 77 and LDL 58 on July lipids.  Lipids well controlled on present regimen  Continue present medications and low fat, low cholesterol diet  For fasting labs before return           Relevant Orders    CBC and Differential    CK, Total    Comprehensive metabolic panel    Lipid panel    TSH w reflex FT4    RESOLVED: Prediabetes    Hypomagnesemia     Takes supplement         Relevant Orders    Magnesium           She will return in 2022.     Eleazar Hall MD  10/29/2021

## 2021-10-29 NOTE — ASSESSMENT & PLAN NOTE
7/14/2021 right/left heart catheterization showed 70% mid LAD focal stenosis just distal to a large diagonal vessel.  The borders of the lesion appear smooth and the lack of other angiographic disease suggests FMD or intramural hematoma as etiology rather than atherosclerotic disease. Low -normal LVEDP  Right heart catheterization showed low right and left heart filling pressures (RA 1, PA 30/8 (15), and PCW 3 mm Hg).  Cardiac output and index of 6.32 L/min and 3.9 L/min/m2 respectively.      She underwent CABG x1, LIMA to LAD, on 7/16/2021.     7/15/2021 echo with LVEF of 60%, no regional wall motion abnormalities, impaired relaxation with trace tricuspid regurgitation and estimated RVSP of 26 mmHg.   No longer has MCGUIRE

## 2021-10-29 NOTE — ASSESSMENT & PLAN NOTE
12/27/ 2005, she had a myocardial infarction due to a coronary artery dissection. Cath report said a second diagonal branch was the culprit vessel.  No PCI was performed. She presented at that time with severe jaw pain that then radiated into the chest and left arm. It was associated with severe nausea and some dyspnea.  I had previously discussed CTA or MRA of the cerebral and abdominal vessels to assess for FMD.      7/14/21 R/L heart catheterization showed 70% mid LAD focal stenosis just distal to a large diagonal vessel.  The borders of the lesion appear smooth and the lack of other angiographic disease suggests FMD or intramural hematoma as etiology rather than atherosclerotic disease.     7/14/2021 carotid ultrasound showed no hemodynamically significant stenosis bilaterally.     I had previously discussed with her and her    the association between SCAD and FMD  I had advised CTA or MRA of head and neck, abdomen and pelvis at some point to assess for FMD    She has an appt with Dr. Ricardo Otero at [place] in [place] for evaluation for FMD

## 2021-10-29 NOTE — ASSESSMENT & PLAN NOTE
She was in ED on 8/29 with pleuritic chest pain- CT showed trace pericardial effusion and no pulmonary embolus.  It was thought to be due to postop pericarditis. She took ibuprofen for a few days with resolution of the symptom.  This has not recurred.

## 2021-10-29 NOTE — ASSESSMENT & PLAN NOTE
7/14/21 R/L heart catheterization showed 70% mid LAD focal stenosis just distal to a large diagonal vessel.  The borders of the lesion appear smooth and the lack of other angiographic disease suggests FMD or intramural hematoma as etiology rather than atherosclerotic disease.  She underwent robotic bypass x1, LIMA to LAD, on 7/16/2021.    Patient was seen in the ED on 8/29 for complaints of chest pain.  CT of the chest was negative for PE and showed just trace pericardial effusion.  Troponin level negative x1.  She had resumed her NSAID and colchicine. She was treated for presumed pericarditis.     I told her she could stop both clopidogrel and colchicine.

## 2021-12-29 ENCOUNTER — TELEPHONE (OUTPATIENT)
Dept: SCHEDULING | Facility: CLINIC | Age: 61
End: 2021-12-29
Payer: COMMERCIAL

## 2021-12-29 NOTE — TELEPHONE ENCOUNTER
Patient states she dropped off CT disc several months ago, and states disc is from 2005, and she can not get another copy. Pt states she needs that disc back    Pt can be reached at [phone]

## 2022-03-03 NOTE — ASSESSMENT & PLAN NOTE
7/14/2021 right/left heart catheterization showed 70% mid LAD focal stenosis just distal to a large diagonal vessel.  The borders of the lesion appear smooth and the lack of other angiographic disease suggests FMD or intramural hematoma as etiology rather than atherosclerotic disease. Low -normal LVEDP  Right heart catheterization showed low right and left heart filling pressures (RA 1, PA 30/8 (15), and PCW 3 mm Hg).  Cardiac output and index of 6.32 L/min and 3.9 L/min/m2 respectively.      She underwent CABG x1, LIMA to LAD, on 7/16/2021.     7/15/2021 echo with LVEF of 60%, no regional wall motion abnormalities, impaired relaxation with trace tricuspid regurgitation and estimated RVSP of 26 mmHg.    She will experience fast HR and SOB if walking at a quick pace.

## 2022-03-03 NOTE — ASSESSMENT & PLAN NOTE
12/27/ 2005, she had a myocardial infarction due to a coronary artery dissection. Cath report said a second diagonal branch was the culprit vessel.  No PCI was performed. She presented at that time with severe jaw pain that then radiated into the chest and left arm. It was associated with severe nausea and some dyspnea.  I had previously discussed CTA or MRA of the cerebral and abdominal vessels to assess for FMD.      7/14/21 R/L heart catheterization showed 70% mid LAD focal stenosis just distal to a large diagonal vessel.  The borders of the lesion appear smooth and the lack of other angiographic disease suggests FMD or intramural hematoma as etiology rather than atherosclerotic disease.     7/14/2021 carotid ultrasound showed no hemodynamically significant stenosis bilaterally.     I had previously discussed with her and her    the association between SCAD and FMD  I had advised CTA or MRA of head and neck, abdomen and pelvis at some point to assess for FMD     She was seen by Dr. Ricardo Otero at [place] in [place] for evaluation for FMD.  She does not have FMD and told that she has SCAD.  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  She is to undergo CT of abdomen and pelvis  tomorrow.

## 2022-03-08 ENCOUNTER — OFFICE VISIT (OUTPATIENT)
Dept: CARDIOLOGY | Facility: CLINIC | Age: 62
End: 2022-03-08
Payer: COMMERCIAL

## 2022-03-08 VITALS
OXYGEN SATURATION: 99 % | DIASTOLIC BLOOD PRESSURE: 72 MMHG | WEIGHT: 130.6 LBS | SYSTOLIC BLOOD PRESSURE: 106 MMHG | HEIGHT: 65 IN | HEART RATE: 68 BPM | BODY MASS INDEX: 21.76 KG/M2 | RESPIRATION RATE: 14 BRPM

## 2022-03-08 DIAGNOSIS — I77.3 FIBROMUSCULAR DYSPLASIA (CMS/HCC): ICD-10-CM

## 2022-03-08 DIAGNOSIS — I31.9 PERICARDITIS, UNSPECIFIED CHRONICITY, UNSPECIFIED TYPE: ICD-10-CM

## 2022-03-08 DIAGNOSIS — Z95.1 S/P CABG X 1: ICD-10-CM

## 2022-03-08 DIAGNOSIS — R00.2 PALPITATIONS: ICD-10-CM

## 2022-03-08 DIAGNOSIS — R06.09 DOE (DYSPNEA ON EXERTION): Primary | ICD-10-CM

## 2022-03-08 DIAGNOSIS — I25.42 CORONARY ARTERY DISSECTION: ICD-10-CM

## 2022-03-08 DIAGNOSIS — E83.42 HYPOMAGNESEMIA: ICD-10-CM

## 2022-03-08 DIAGNOSIS — I21.4 NSTEMI (NON-ST ELEVATED MYOCARDIAL INFARCTION) (CMS/HCC): ICD-10-CM

## 2022-03-08 DIAGNOSIS — E78.5 DYSLIPIDEMIA: ICD-10-CM

## 2022-03-08 PROCEDURE — 93000 ELECTROCARDIOGRAM COMPLETE: CPT | Performed by: INTERNAL MEDICINE

## 2022-03-08 PROCEDURE — 3008F BODY MASS INDEX DOCD: CPT | Performed by: INTERNAL MEDICINE

## 2022-03-08 PROCEDURE — 99214 OFFICE O/P EST MOD 30 MIN: CPT | Mod: 25 | Performed by: INTERNAL MEDICINE

## 2022-03-08 RX ORDER — METOPROLOL SUCCINATE 25 MG/1
12.5 TABLET, EXTENDED RELEASE ORAL DAILY
Qty: 45 TABLET | Refills: 3 | Status: SHIPPED | OUTPATIENT
Start: 2022-03-08 | End: 2022-09-07 | Stop reason: SDUPTHER

## 2022-03-08 RX ORDER — LANOLIN ALCOHOL/MO/W.PET/CERES
400 CREAM (GRAM) TOPICAL 2 TIMES DAILY
Qty: 180 TABLET | Refills: 3 | Status: SHIPPED | OUTPATIENT
Start: 2022-03-08 | End: 2022-09-07 | Stop reason: SDUPTHER

## 2022-03-08 RX ORDER — ATORVASTATIN CALCIUM 40 MG/1
40 TABLET, FILM COATED ORAL NIGHTLY
Qty: 90 TABLET | Refills: 3 | Status: SHIPPED | OUTPATIENT
Start: 2022-03-08 | End: 2022-09-07 | Stop reason: SDUPTHER

## 2022-03-08 ASSESSMENT — ENCOUNTER SYMPTOMS
DEPRESSION: 0
PALPITATIONS: 0
DIAPHORESIS: 0
NAUSEA: 0
COUGH: 0
MEMORY LOSS: 0
SHORTNESS OF BREATH: 0
WEIGHT GAIN: 0
DIZZINESS: 0
BACK PAIN: 0
SNORING: 0
DYSPNEA ON EXERTION: 1
HEARTBURN: 0
CLAUDICATION: 0
WEIGHT LOSS: 0

## 2022-03-08 NOTE — LETTER
March 8, 2022     Jorge Robertson MD  [address] [facility] [address]  [id number]  [facility] [address]    Patient: Brian Dean  YOB: 1960  Date of Visit: 3/8/2022      Dear Dr. Robertson:    Thank you for referring Brian Dean to me for evaluation. Below are my notes for this consultation.    If you have questions, please do not hesitate to call me. I look forward to following your patient along with you.         Sincerely,        Eleazar Hall MD        CC: Brian Alcantaralexandriakanchan  Eleazar Hall MD  3/8/2022  2:07 PM  Signed     Cardiology Note          HPI   Brian Dean is a 61 y.o. female presents for her follow up visit.    She follows with a doctor in [place] for SCAD, Dr. Vaughn Otero.  She had angiogram of head and neck.  She has a 3 mm intracranial aneurysm.   She also noted to have nodules on thyroid.  She believes this may explain her frequent diaphoresis.      She is to undergo Cat Scan of abdomen tomorrow.    She will walk intermittently.  She reports last month she walked for 4 and 1/2 miles. She reports she would need to stop intermittently due to fatigue and SOB.    She fell and broke her foot in December and was sedentary for 6 weeks.    She will experience rapid heart rates with exertion and will feel SOB.    She denies chest distress, PND/orthopnea and pre-syncope/syncope.      Past Medical History:   Diagnosis Date   • Allergies    • Anxiety    • Asthma    • Coronary artery disease    • Coronary artery dissection    • Heart disease    • Lipid disorder    • NSTEMI (non-ST elevated myocardial infarction) (CMS/[facility])    • Parvovirus B19 infection        Past Surgical History:   Procedure Laterality Date   • CORONARY ARTERY BYPASS GRAFT  07/16/2021    robotic - single bypass   • REDUCTION MAMMAPLASTY     • REDUCTION MAMMAPLASTY         Social History     Tobacco Use   • Smoking status: Never Smoker   • Smokeless tobacco: Never Used   Vaping Use   • Vaping Use: Never used   Substance Use  Topics   • Alcohol use: Never   • Drug use: Never       Family History   Problem Relation Age of Onset   • Hypertension Biological Mother    • Heart disease Biological Brother        Allergies  Sulfa (sulfonamide antibiotics) and Sulfamethoxazole-trimethoprim    Current Outpatient Medications   Medication Sig Dispense Refill   • albuterol HFA (VENTOLIN HFA) 90 mcg/actuation inhaler as needed.     • aspirin 81 mg enteric coated tablet Take 81 mg by mouth daily.     • atorvastatin (LIPITOR) 40 mg tablet Take 1 tablet (40 mg total) by mouth nightly. 90 tablet 3   • azelastine (ASTELIN) 137 mcg (0.1 %) nasal spray as needed.     • buPROPion XL (WELLBUTRIN XL) 300 mg 24 hr tablet Take 300 mg by mouth daily.     • diphenhydrAMINE (BenadryL) 25 mg capsule Take 1 tablet by mouth nightly.       • DULoxetine (CYMBALTA) 30 mg capsule Take 90 mg by mouth daily.       • eszopiclone (LUNESTA) 3 mg tablet Take 3 mg by mouth nightly.     • LORazepam (ATIVAN) 0.5 mg tablet Take 0.5 mg by mouth as needed.     • magnesium oxide (MAG-OX) 400 mg (241.3 mg magnesium) tablet Take 1 tablet (400 mg total) by mouth 2 (two) times a day. 180 tablet 3   • metoprolol succinate XL (TOPROL-XL) 25 mg 24 hr tablet Take 0.5 tablets (12.5 mg total) by mouth daily. 45 tablet 3   • montelukast (SINGULAIR) 10 mg tablet Take 1 tablet by mouth nightly.       No current facility-administered medications for this visit.         Review of Systems   Constitutional: Negative for diaphoresis, weight gain and weight loss.   Eyes: Negative for visual disturbance.   Cardiovascular: Positive for dyspnea on exertion. Negative for chest pain, claudication, leg swelling and palpitations.   Respiratory: Negative for cough, shortness of breath and snoring.    Skin: Negative for rash.   Musculoskeletal: Negative for arthritis, back pain, joint pain and muscle weakness.   Gastrointestinal: Negative for heartburn and nausea.   Neurological: Negative for dizziness.    Psychiatric/Behavioral: Negative for depression and memory loss.       Objective     Vitals:    03/08/22 1257   BP: 106/72   Pulse: 68   Resp: 14   SpO2: 99%   BP equal in both arms   Physical Exam  Constitutional:       Appearance: She is well-developed.   HENT:      Head: Normocephalic.   Neck:      Vascular: No JVD.   Cardiovascular:      Rate and Rhythm: Normal rate.      Heart sounds: Normal heart sounds.   Pulmonary:      Breath sounds: Normal breath sounds.   Abdominal:      General: Bowel sounds are normal.      Palpations: Abdomen is soft.   Skin:     Findings: No rash.   Neurological:      Mental Status: She is alert and oriented to person, place, and time.   Psychiatric:         Behavior: Behavior normal.         Lab Results   Component Value Date    WBC 10.16 08/29/2021    HGB 11.5 (L) 08/29/2021     (H) 08/29/2021    CHOL 150 07/14/2021    TRIG 73 07/14/2021    HDL 77 07/14/2021    ALT 32 08/29/2021    AST 32 08/29/2021     (L) 08/29/2021    K 4.1 08/29/2021    CREATININE 1.0 08/29/2021    TSH 2.33 07/14/2021    INR 0.9 07/19/2021    HGBA1C 5.5 07/14/2021        Imaging  Cerebrovascular CTA see below     ECG sinus, cannot exclude age indeterminate anteroseptal infarction         Wt Readings from Last 3 Encounters:   03/08/22 59.2 kg (130 lb 9.6 oz)   10/29/21 56.2 kg (124 lb)   08/29/21 57.6 kg (127 lb)       Problem List Items Addressed This Visit        High    MCGUIRE (dyspnea on exertion) - Primary     7/14/2021 right/left heart catheterization showed 70% mid LAD focal stenosis just distal to a large diagonal vessel.  The borders of the lesion appear smooth and the lack of other angiographic disease suggests FMD or intramural hematoma as etiology rather than atherosclerotic disease. Low -normal LVEDP  Right heart catheterization showed low right and left heart filling pressures (RA 1, PA 30/8 (15), and PCW 3 mm Hg).  Cardiac output and index of 6.32 L/min and 3.9 L/min/m2  respectively.      She underwent CABG x1, LIMA to LAD, on 7/16/2021.     7/15/2021 echo with LVEF of 60%, no regional wall motion abnormalities, impaired relaxation with trace tricuspid regurgitation and estimated RVSP of 26 mmHg.    She will experience fast HR and SOB if walking at a quick pace.         Relevant Orders    ECG 12 LEAD-OFFICE PERFORMED (Completed)    S/P CABG x 1     No  chest discomfort  Continue ASA, atorvastatin and metoprolol             Medium    NSTEMI (non-ST elevated myocardial infarction) (CMS/[facility])     Non-STEMI in 2005 at the time of her coronary artery dissection.         Relevant Medications    metoprolol succinate XL (TOPROL-XL) 25 mg 24 hr tablet    atorvastatin (LIPITOR) 40 mg tablet    Pericarditis     She was in ED on 8/29 with pleuritic chest pain- CT showed trace pericardial effusion and no pulmonary embolus.  It was thought to be due to postop pericarditis. She took ibuprofen for a few days with resolution of the symptom.  This has not recurred.          Coronary artery dissection     12/27/ 2005, she had a myocardial infarction due to a coronary artery dissection. Cath report said a second diagonal branch was the culprit vessel.  No PCI was performed. She presented at that time with severe jaw pain that then radiated into the chest and left arm. It was associated with severe nausea and some dyspnea.  I had previously discussed CTA or MRA of the cerebral and abdominal vessels to assess for FMD.      7/14/21 R/L heart catheterization showed 70% mid LAD focal stenosis just distal to a large diagonal vessel.  The borders of the lesion appear smooth and the lack of other angiographic disease suggests FMD or intramural hematoma as etiology rather than atherosclerotic disease.     7/14/2021 carotid ultrasound showed no hemodynamically significant stenosis bilaterally.     I had previously discussed with her and her    the association between SCAD and FMD  I had advised CTA or MRA  of head and neck, abdomen and pelvis at some point to assess for FMD     She was seen by Dr. Ricardo Otero at [facility] in [place] for evaluation for FMD.  She does not have FMD and told that she has SCAD.  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  She is to undergo CT of abdomen and pelvis  tomorrow.             Relevant Medications    metoprolol succinate XL (TOPROL-XL) 25 mg 24 hr tablet    atorvastatin (LIPITOR) 40 mg tablet    Palpitations     She will note fast heart rates with exertion at times.    I  discussed options of outpatient telemetry of variable duration based on frequency of episodes.            Fibromuscular dysplasia (CMS/HCC)     She was seen by Dr. Ricardo Otero at [facility] in [place] for evaluation for FMD.  She does not have FMD and told that she had SCAD.  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  She is to undergo CT of abdomen and pelvis on 3/9            Low    Dyslipidemia     She will undergo fasting labs in the near future.  Continue atorvastatin            Relevant Orders    CBC and Differential    Comprehensive metabolic panel    Lipid panel    CK TOTAL (NO REFLEX)    TSH w reflex FT4    Hypomagnesemia     Takes supplement   For repeat level          Relevant Orders    Magnesium         IMariah CRNP, am scribing for, and in the presence of, Eleazar Hall MD.    3/8/2022 2:07 PM  Eleazar LAMAS MD,  personally performed the services described in this documentation as scribed by Mariah Valladares in my presence, and it is both accurate and complete.    3/8/2022 2:07 PM    She will return later in the year.     Eleazar Hall MD  3/8/2022

## 2022-03-08 NOTE — PROGRESS NOTES
Cardiology Note          HPI   Brian Dean is a 61 y.o. female presents for her follow up visit.    She follows with a doctor in [place] for SCAD, Dr. Vaughn Otero.  She had angiogram of head and neck.  She has a 3 mm intracranial aneurysm.   She also noted to have nodules on thyroid.  She believes this may explain her frequent diaphoresis.      She is to undergo Cat Scan of abdomen tomorrow.    She will walk intermittently.  She reports last month she walked for 4 and 1/2 miles. She reports she would need to stop intermittently due to fatigue and SOB.    She fell and broke her foot in December and was sedentary for 6 weeks.    She will experience rapid heart rates with exertion and will feel SOB.    She denies chest distress, PND/orthopnea and pre-syncope/syncope.      Past Medical History:   Diagnosis Date   • Allergies    • Anxiety    • Asthma    • Coronary artery disease    • Coronary artery dissection    • Heart disease    • Lipid disorder    • NSTEMI (non-ST elevated myocardial infarction) (CMS/HCC)    • Parvovirus B19 infection        Past Surgical History:   Procedure Laterality Date   • CORONARY ARTERY BYPASS GRAFT  07/16/2021    robotic - single bypass   • REDUCTION MAMMAPLASTY     • REDUCTION MAMMAPLASTY         Social History     Tobacco Use   • Smoking status: Never Smoker   • Smokeless tobacco: Never Used   Vaping Use   • Vaping Use: Never used   Substance Use Topics   • Alcohol use: Never   • Drug use: Never       Family History   Problem Relation Age of Onset   • Hypertension Biological Mother    • Heart disease Biological Brother        Allergies  Sulfa (sulfonamide antibiotics) and Sulfamethoxazole-trimethoprim    Current Outpatient Medications   Medication Sig Dispense Refill   • albuterol HFA (VENTOLIN HFA) 90 mcg/actuation inhaler as needed.     • aspirin 81 mg enteric coated tablet Take 81 mg by mouth daily.     • atorvastatin (LIPITOR) 40 mg tablet Take 1 tablet (40 mg total) by mouth  nightly. 90 tablet 3   • azelastine (ASTELIN) 137 mcg (0.1 %) nasal spray as needed.     • buPROPion XL (WELLBUTRIN XL) 300 mg 24 hr tablet Take 300 mg by mouth daily.     • diphenhydrAMINE (BenadryL) 25 mg capsule Take 1 tablet by mouth nightly.       • DULoxetine (CYMBALTA) 30 mg capsule Take 90 mg by mouth daily.       • eszopiclone (LUNESTA) 3 mg tablet Take 3 mg by mouth nightly.     • LORazepam (ATIVAN) 0.5 mg tablet Take 0.5 mg by mouth as needed.     • magnesium oxide (MAG-OX) 400 mg (241.3 mg magnesium) tablet Take 1 tablet (400 mg total) by mouth 2 (two) times a day. 180 tablet 3   • metoprolol succinate XL (TOPROL-XL) 25 mg 24 hr tablet Take 0.5 tablets (12.5 mg total) by mouth daily. 45 tablet 3   • montelukast (SINGULAIR) 10 mg tablet Take 1 tablet by mouth nightly.       No current facility-administered medications for this visit.         Review of Systems   Constitutional: Negative for diaphoresis, weight gain and weight loss.   Eyes: Negative for visual disturbance.   Cardiovascular: Positive for dyspnea on exertion. Negative for chest pain, claudication, leg swelling and palpitations.   Respiratory: Negative for cough, shortness of breath and snoring.    Skin: Negative for rash.   Musculoskeletal: Negative for arthritis, back pain, joint pain and muscle weakness.   Gastrointestinal: Negative for heartburn and nausea.   Neurological: Negative for dizziness.   Psychiatric/Behavioral: Negative for depression and memory loss.       Objective     Vitals:    03/08/22 1257   BP: 106/72   Pulse: 68   Resp: 14   SpO2: 99%   BP equal in both arms   Physical Exam  Constitutional:       Appearance: She is well-developed.   HENT:      Head: Normocephalic.   Neck:      Vascular: No JVD.   Cardiovascular:      Rate and Rhythm: Normal rate.      Heart sounds: Normal heart sounds.   Pulmonary:      Breath sounds: Normal breath sounds.   Abdominal:      General: Bowel sounds are normal.      Palpations: Abdomen is  soft.   Skin:     Findings: No rash.   Neurological:      Mental Status: She is alert and oriented to person, place, and time.   Psychiatric:         Behavior: Behavior normal.         Lab Results   Component Value Date    WBC 10.16 08/29/2021    HGB 11.5 (L) 08/29/2021     (H) 08/29/2021    CHOL 150 07/14/2021    TRIG 73 07/14/2021    HDL 77 07/14/2021    ALT 32 08/29/2021    AST 32 08/29/2021     (L) 08/29/2021    K 4.1 08/29/2021    CREATININE 1.0 08/29/2021    TSH 2.33 07/14/2021    INR 0.9 07/19/2021    HGBA1C 5.5 07/14/2021        Imaging  Cerebrovascular CTA see below     ECG sinus, cannot exclude age indeterminate anteroseptal infarction         Wt Readings from Last 3 Encounters:   03/08/22 59.2 kg (130 lb 9.6 oz)   10/29/21 56.2 kg (124 lb)   08/29/21 57.6 kg (127 lb)       Problem List Items Addressed This Visit        High    MCGUIRE (dyspnea on exertion) - Primary     7/14/2021 right/left heart catheterization showed 70% mid LAD focal stenosis just distal to a large diagonal vessel.  The borders of the lesion appear smooth and the lack of other angiographic disease suggests FMD or intramural hematoma as etiology rather than atherosclerotic disease. Low -normal LVEDP  Right heart catheterization showed low right and left heart filling pressures (RA 1, PA 30/8 (15), and PCW 3 mm Hg).  Cardiac output and index of 6.32 L/min and 3.9 L/min/m2 respectively.      She underwent CABG x1, LIMA to LAD, on 7/16/2021.     7/15/2021 echo with LVEF of 60%, no regional wall motion abnormalities, impaired relaxation with trace tricuspid regurgitation and estimated RVSP of 26 mmHg.    She will experience fast HR and SOB if walking at a quick pace.         Relevant Orders    ECG 12 LEAD-OFFICE PERFORMED (Completed)    S/P CABG x 1     No  chest discomfort  Continue ASA, atorvastatin and metoprolol             Medium    NSTEMI (non-ST elevated myocardial infarction) (CMS/HCC)     Non-STEMI in 2005 at the time of  her coronary artery dissection.         Relevant Medications    metoprolol succinate XL (TOPROL-XL) 25 mg 24 hr tablet    atorvastatin (LIPITOR) 40 mg tablet    Pericarditis     She was in ED on 8/29 with pleuritic chest pain- CT showed trace pericardial effusion and no pulmonary embolus.  It was thought to be due to postop pericarditis. She took ibuprofen for a few days with resolution of the symptom.  This has not recurred.          Coronary artery dissection     12/27/ 2005, she had a myocardial infarction due to a coronary artery dissection. Cath report said a second diagonal branch was the culprit vessel.  No PCI was performed. She presented at that time with severe jaw pain that then radiated into the chest and left arm. It was associated with severe nausea and some dyspnea.  I had previously discussed CTA or MRA of the cerebral and abdominal vessels to assess for FMD.      7/14/21 R/L heart catheterization showed 70% mid LAD focal stenosis just distal to a large diagonal vessel.  The borders of the lesion appear smooth and the lack of other angiographic disease suggests FMD or intramural hematoma as etiology rather than atherosclerotic disease.     7/14/2021 carotid ultrasound showed no hemodynamically significant stenosis bilaterally.     I had previously discussed with her and her    the association between SCAD and FMD  I had advised CTA or MRA of head and neck, abdomen and pelvis at some point to assess for FMD     She was seen by Dr. Ricardo Otero at [facility] in [place] for evaluation for FMD.  She does not have FMD and told that she has SCAD.  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  She is to undergo CT of abdomen and pelvis  tomorrow.             Relevant Medications    metoprolol succinate XL (TOPROL-XL) 25 mg 24 hr tablet    atorvastatin (LIPITOR) 40 mg tablet    Palpitations     She will note fast heart rates with exertion at times.     I  discussed options of outpatient telemetry of variable duration based on frequency of episodes.            Fibromuscular dysplasia (CMS/HCC)     She was seen by Dr. Ricardo Otero at [facility] in [place] for evaluation for FMD.  She does not have FMD and told that she had SCAD.  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  She is to undergo CT of abdomen and pelvis on 3/9            Low    Dyslipidemia     She will undergo fasting labs in the near future.  Continue atorvastatin            Relevant Orders    CBC and Differential    Comprehensive metabolic panel    Lipid panel    CK TOTAL (NO REFLEX)    TSH w reflex FT4    Hypomagnesemia     Takes supplement   For repeat level          Relevant Orders    Magnesium         I, MISAEL Pacheco, am scribing for, and in the presence of, Eleazar Hall MD.    3/8/2022 2:07 PM  IEleazar MD,  personally performed the services described in this documentation as scribed by Mariah Valladares in my presence, and it is both accurate and complete.    3/8/2022 2:07 PM    She will return later in the year.     Eleazar Hall MD  3/8/2022

## 2022-03-08 NOTE — ASSESSMENT & PLAN NOTE
She will note fast heart rates with exertion at times.    I  discussed options of outpatient telemetry of variable duration based on frequency of episodes.

## 2022-03-08 NOTE — ASSESSMENT & PLAN NOTE
She was seen by Dr. Ricardo Otero at [facility] in [place] for evaluation for FMD.  She does not have FMD and told that she had SCAD.  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  She is to undergo CT of abdomen and pelvis on 3/9

## 2022-03-10 ENCOUNTER — TELEPHONE (OUTPATIENT)
Dept: SCHEDULING | Facility: CLINIC | Age: 62
End: 2022-03-10
Payer: COMMERCIAL

## 2022-03-10 NOTE — TELEPHONE ENCOUNTER
Pt saw a cardiologist at [facility] yesterday and he confirmed that she has a dx: of FMD of renal system.    She says the report will be faxed.  I sent a release request via [employer] for care everywhere so records can be viewed.    She can be reached at: [phone]

## 2022-04-12 PROBLEM — Z00.00 ENCOUNTER FOR MEDICAL EXAMINATION TO ESTABLISH CARE: Status: ACTIVE | Noted: 2022-04-12

## 2022-04-12 NOTE — ASSESSMENT & PLAN NOTE
Here as a new patient and to establish care. We discussed her medical history and reviewed preventive measures that are due.   -Covid-19 vaccine series: Complete x3, due for her 2nd booster end of May  -Influenza vaccine: Complete for season  -Tdap: UTD  -Shingrix: Due for #2, ran out today, will return.  -Pap smears: UTD with Gyn  -Mammogram: Overdue, last performed in 2020 and normal. Script provided.  -Colonoscopy: Last in 2012, due now. She will call to schedule.

## 2022-04-12 NOTE — PROGRESS NOTES
Leeann Ch MD  [facility]  [address], [address]  [place] [address]  [phone]     Reason for visit:   Chief Complaint   Patient presents with   • Annual Exam   • Consult      HPI   Brian Dean is a 61 y.o. female who presents to establish care.    PMH- Fibromuscular dysplasia (new diagnosis), h/o Supraclinoid internal cerebral artery aneurysm, Asthma, PID, Thyroid nodules, Hyperlipidemia.    Previous primary care with Dr. Jorge Robertson.    She was just diagnosed with FMD last month by CTAP by her Cardiologist, Dr. Ricardo Otero, in [place], [place]. She has a history of NSTEMI secondary to spontaneous coronary artery dissection of a diagonal artery in 2005, treated medically at that time. She then had SCD of her LAD in 7/2021 and received CABG x1 by her local Cardiologist, Dr. Hall. She has a known left supraclinoid ICA aneurysm (3 mm) as well. This was all believed to be due to SCD only, however, she had a CT AP on 3/9/22 that revealed mild beading of the distal branches of her right renal artery, consistent with a diagnosis of FMD. She is treated medically with ASA 81 mg (lifelong) and beta blocker. She plans to continue her care with Dr. Otero for the FMD. She will have an MRA of her brain on Monday.     For the ICA aneurysm, she has been recommended to establish with a neurointerventionist in [place] (Dr. Leonard) and is doing so.    Her recent study also revealed thyroid nodules, for which she is scheduled to establish care with Dr. Shipman in May. She had a biopsy performed 25 years ago, negative.    She has persistent shortness of breath and palpitations with exertion, and is seeing Dr. Hall about this presently. Her bypass was patent on her CT. She is planned to see Dr. Hall, and will discuss stress echocardiogram.    She has pulmonary nodules, and is planned for repeat non-con CT chest in one year. These have been stable for a long time, notable on imaging from  2005. She has no history of smoking tobacco. She is not prescribed daily inhalers for her asthma, only albuterol as needed (last use was one year ago) and daily singulair.    She takes atorvastatin for cholesterol and CAD.    She has a history of depression and anxiety. Is prescribed Wellbutrin 300 mg, Cymbalta 30 mg for anxiety, and Ativan 0.5 mg. She takes the Ativan very sparingly - used it during significant stress episodes in the past, hardly at all now. Also prescribe Lunesta 3 mg, using every night since age 49 (menopause). Her psychiatric care was previously managed by her PCP.     Preventive  Shingrix- due for #2  Tdap- UTD  Covid-19- Complete x3, due for second booster end of May/[facility]  Mammogram- Overdue, BiRads Category 2 in 2020  Pap smear- UTD with Gyn  Colonoscopy- Last in 2012, recommended repeat  Screening labs- ordered by her Cardiologist        Past Medical History:   Diagnosis Date   • Allergies    • Anxiety    • Asthma    • Coronary artery disease    • Coronary artery dissection    • Heart disease    • Lipid disorder    • NSTEMI (non-ST elevated myocardial infarction) (CMS/HCC)    • Parvovirus B19 infection      Past Surgical History:   Procedure Laterality Date   • CORONARY ARTERY BYPASS GRAFT  07/16/2021    robotic - single bypass   • REDUCTION MAMMAPLASTY     • REDUCTION MAMMAPLASTY       Social History     Social History Narrative   • Not on file     Family History   Problem Relation Age of Onset   • Heart disease Biological Mother    • Hypertension Biological Mother    • Atrial fibrillation Biological Mother    • Heart disease Biological Father    • Heart disease Biological Brother      Sulfa (sulfonamide antibiotics) and Sulfamethoxazole-trimethoprim  Current Outpatient Medications   Medication Sig Dispense Refill   • albuterol HFA (VENTOLIN HFA) 90 mcg/actuation inhaler as needed.     • aspirin 81 mg enteric coated tablet Take 81 mg by mouth daily.     • atorvastatin (LIPITOR) 40 mg tablet  Take 1 tablet (40 mg total) by mouth nightly. 90 tablet 3   • azelastine (ASTELIN) 137 mcg (0.1 %) nasal spray as needed.     • buPROPion XL (WELLBUTRIN XL) 300 mg 24 hr tablet Take 300 mg by mouth daily.     • diphenhydrAMINE (BenadryL) 25 mg capsule Take 1 tablet by mouth nightly.       • DULoxetine (CYMBALTA) 30 mg capsule Take 90 mg by mouth daily.       • eszopiclone (LUNESTA) 3 mg tablet Take 3 mg by mouth nightly.     • LORazepam (ATIVAN) 0.5 mg tablet Take 0.5 mg by mouth as needed.     • magnesium oxide (MAG-OX) 400 mg (241.3 mg magnesium) tablet Take 1 tablet (400 mg total) by mouth 2 (two) times a day. 180 tablet 3   • metoprolol succinate XL (TOPROL-XL) 25 mg 24 hr tablet Take 0.5 tablets (12.5 mg total) by mouth daily. 45 tablet 3   • montelukast (SINGULAIR) 10 mg tablet Take 1 tablet by mouth nightly.       No current facility-administered medications for this visit.       Review of Systems   Constitutional: Negative for activity change, appetite change, fatigue, fever and unexpected weight change.   HENT: Negative for dental problem, ear discharge, ear pain, hearing loss, tinnitus, trouble swallowing and voice change.    Eyes: Negative for visual disturbance.   Respiratory: Positive for shortness of breath. Negative for apnea, cough, chest tightness and wheezing.         SOB with exertion   Cardiovascular: Positive for palpitations. Negative for chest pain and leg swelling.   Gastrointestinal: Negative for abdominal distention, abdominal pain, blood in stool, constipation, diarrhea, nausea and vomiting.   Endocrine: Negative for cold intolerance and heat intolerance.   Genitourinary: Negative for difficulty urinating and hematuria.   Musculoskeletal: Negative for arthralgias, back pain, joint swelling and myalgias.   Skin: Negative for rash and wound.   Allergic/Immunologic: Negative for immunocompromised state.   Neurological: Negative for dizziness, weakness, light-headedness, numbness and  "headaches.   Psychiatric/Behavioral: Negative for dysphoric mood and sleep disturbance. The patient is not nervous/anxious.        Objective   Vitals:    04/13/22 0823   BP: 112/82   BP Location: Left upper arm   Patient Position: Sitting   Pulse: 85   Temp: 36.6 °C (97.9 °F)   TempSrc: Oral   SpO2: 97%   Weight: 58.5 kg (129 lb)   Height: 1.676 m (5' 6\")     Body mass index is 20.82 kg/m².    Physical Exam  Vitals and nursing note reviewed.   Constitutional:       General: She is not in acute distress.     Appearance: Normal appearance.   HENT:      Head: Normocephalic and atraumatic.      Right Ear: Tympanic membrane, ear canal and external ear normal.      Left Ear: Tympanic membrane, ear canal and external ear normal.      Nose: Nose normal.      Mouth/Throat:      Mouth: Mucous membranes are moist.      Pharynx: Oropharynx is clear. No oropharyngeal exudate or posterior oropharyngeal erythema.   Eyes:      General: No scleral icterus.     Extraocular Movements: Extraocular movements intact.      Conjunctiva/sclera: Conjunctivae normal.      Pupils: Pupils are equal, round, and reactive to light.   Neck:      Thyroid: No thyroid mass, thyromegaly or thyroid tenderness.      Trachea: Trachea and phonation normal.   Cardiovascular:      Rate and Rhythm: Normal rate and regular rhythm.      Pulses: Normal pulses.      Heart sounds: Normal heart sounds. No murmur heard.  Pulmonary:      Effort: Pulmonary effort is normal. No respiratory distress.      Breath sounds: Normal breath sounds. No wheezing, rhonchi or rales.   Abdominal:      General: Abdomen is flat. There is no distension.      Palpations: Abdomen is soft. There is no mass.      Tenderness: There is no abdominal tenderness. There is no right CVA tenderness, left CVA tenderness, guarding or rebound.   Musculoskeletal:         General: No swelling or tenderness. Normal range of motion.      Cervical back: Normal range of motion and neck supple. No " tenderness.      Right lower leg: No edema.      Left lower leg: No edema.   Lymphadenopathy:      Cervical: No cervical adenopathy.   Skin:     General: Skin is warm.      Capillary Refill: Capillary refill takes less than 2 seconds.      Findings: No lesion or rash.   Neurological:      General: No focal deficit present.      Mental Status: She is alert and oriented to person, place, and time. Mental status is at baseline.      Cranial Nerves: No cranial nerve deficit.      Sensory: No sensory deficit.      Coordination: Coordination normal.      Gait: Gait normal.      Deep Tendon Reflexes: Reflexes normal.   Psychiatric:         Mood and Affect: Mood normal.         Behavior: Behavior normal.         Thought Content: Thought content normal.         Judgment: Judgment normal.                 Assessment   Problem List Items Addressed This Visit        Respiratory    Asthma     Prescribed Singulair and PRN albuterol. Undergoing workup with her specialists for MCGUIRE with exertion. If not a primary cardiac issue, I advise evaluation with Pulmonology evaluation and testing.           Pulmonary nodules     Will repeat a non-con CT Chest in one year. Under the care of her specialists.              Circulatory    NSTEMI (non-ST elevated myocardial infarction) (CMS/HCC)     In setting of spontaneous coronary artery dissection in 2005. Different health system, no PCI at that time. Continue medical management.           Coronary artery dissection    Fibromuscular dysplasia (CMS/HCC)     New diagnosis confirmed on CT AP 3/9/22. Under the care of Dr. Ricardo Otero (Cardiology, [place], [place]) and Dr. Rafael turcios. Just recently established with Dr. Leonard, neurointerventionist at [facility], for her right ICA aneurysm. Medically managed with life-long ASA 81 mg and Metoprolol.              Endocrine/Metabolic    Dyslipidemia     Continue Lipitor. Has fasted lab scripts ordered by her Cardiologist.              Other    S/P CABG x 1     Anxiety     Mood is stable on her current regimen: Wellbutrin, Cymbalta, Lunesta. Rarely using Ativan. Has been managed by her PCP.            Encounter for medical examination to establish care - Primary     Here as a new patient and to establish care. We discussed her medical history and reviewed preventive measures that are due.   -Covid-19 vaccine series: Complete x3, due for her 2nd booster end of May  -Influenza vaccine: Complete for season  -Tdap: UTD  -Shingrix: Due for #2, ran out today, will return.  -Pap smears: UTD with Gyn  -Mammogram: Overdue, last performed in 2020 and normal. Script provided.  -Colonoscopy: Last in 2012, due now. She will call to schedule.                   Other Visit Diagnoses     Encounter for screening mammogram for malignant neoplasm of breast        Relevant Orders    BI SCREENING MAMMOGRAM BILATERAL(TOMOSYNTHESIS)    Need for vaccination                  Leeann Ch MD  4/13/2022

## 2022-04-12 NOTE — ASSESSMENT & PLAN NOTE
In setting of spontaneous coronary artery dissection in 2005. Different health system, no PCI at that time. Continue medical management.

## 2022-04-12 NOTE — ASSESSMENT & PLAN NOTE
New diagnosis confirmed on CT AP 3/9/22. Under the care of Dr. Ricardo Otero (Cardiology, [place], [place]) and Dr. Rafael turcios. Just recently established with Dr. Leonard, neurointerventionist at [facility], for her right ICA aneurysm. Medically managed with life-long ASA 81 mg and Metoprolol.

## 2022-04-12 NOTE — PATIENT INSTRUCTIONS
It was great to see you today in the office! Please do not hesitate to contact me through the Patient Portal if you should have any questions or concerns. Lab results will be called to you if found to be abnormal.    -Establish care with Dr. Marleni Shipman (Endocrinology) as planned next month.    -Call to schedule your colonoscopy.   [facility]  [address]  [address]  [place] [address]  Phone: [phone]  -or-  [facility], [address]  [address],  [place] [address]  Phone: [phone]      Sincerely,  Dr. Ch    General Healthful Habits:  -Drink unsweetened beverages and aim to get 6-8 glasses of water daily.   -Avoid regular use of ultra-processed foods, added nitrates or nitrites, sweetened beverages, and artifical sweeteners (sucralose, splenda, equal, sweet-n-low), as they dramatically increase insulin and worsen insulin resistance. Insulin resistance is a the most common risk factor for unhealthy weight gain in the [place].   -Avoid unnecessary antibiotics for yourself and in the meats and dairy products you use.  -Limit alcohol to less than 3 servings in a week.  -Participate in regular dedicated physical activity  --- at least 150 minutes per week (30 minutes, 5 days per week). Make at least 60 of those physical activity minutes resistance training.   -Identify personal stressors.  Modify what you can, and aim to balance what you cannot. Develop and practice daily relaxation techniques to balance stress.  Make time for rejuvenating activities and hobbies at least weekly.   -Get adequate and restful sleep every night ---  7.5-8.5 hours a night is what most people require.  Consider a scheduled sleep and wake time if possible.

## 2022-04-12 NOTE — ASSESSMENT & PLAN NOTE
Prescribed Singulair and PRN albuterol. Undergoing workup with her specialists for MCGUIRE with exertion. If not a primary cardiac issue, I advise evaluation with Pulmonology evaluation and testing.

## 2022-04-12 NOTE — ASSESSMENT & PLAN NOTE
Mood is stable on her current regimen: Wellbutrin, Cymbalta, Lunesta. Rarely using Ativan. Has been managed by her PCP.

## 2022-04-13 ENCOUNTER — OFFICE VISIT (OUTPATIENT)
Dept: FAMILY MEDICINE | Facility: CLINIC | Age: 62
End: 2022-04-13
Payer: COMMERCIAL

## 2022-04-13 VITALS
OXYGEN SATURATION: 97 % | DIASTOLIC BLOOD PRESSURE: 82 MMHG | WEIGHT: 129 LBS | BODY MASS INDEX: 20.73 KG/M2 | TEMPERATURE: 97.9 F | HEIGHT: 66 IN | HEART RATE: 85 BPM | SYSTOLIC BLOOD PRESSURE: 112 MMHG

## 2022-04-13 DIAGNOSIS — I25.42 CORONARY ARTERY DISSECTION: ICD-10-CM

## 2022-04-13 DIAGNOSIS — Z00.00 ENCOUNTER FOR MEDICAL EXAMINATION TO ESTABLISH CARE: Primary | ICD-10-CM

## 2022-04-13 DIAGNOSIS — Z12.31 ENCOUNTER FOR SCREENING MAMMOGRAM FOR MALIGNANT NEOPLASM OF BREAST: ICD-10-CM

## 2022-04-13 DIAGNOSIS — Z95.1 S/P CABG X 1: ICD-10-CM

## 2022-04-13 DIAGNOSIS — J45.909 ASTHMA, UNSPECIFIED ASTHMA SEVERITY, UNSPECIFIED WHETHER COMPLICATED, UNSPECIFIED WHETHER PERSISTENT: ICD-10-CM

## 2022-04-13 DIAGNOSIS — R91.8 PULMONARY NODULES: ICD-10-CM

## 2022-04-13 DIAGNOSIS — I21.4 NSTEMI (NON-ST ELEVATED MYOCARDIAL INFARCTION) (CMS/HCC): ICD-10-CM

## 2022-04-13 DIAGNOSIS — F41.9 ANXIETY: ICD-10-CM

## 2022-04-13 DIAGNOSIS — E78.5 DYSLIPIDEMIA: ICD-10-CM

## 2022-04-13 DIAGNOSIS — Z23 NEED FOR VACCINATION: ICD-10-CM

## 2022-04-13 DIAGNOSIS — I77.3 FIBROMUSCULAR DYSPLASIA (CMS/HCC): ICD-10-CM

## 2022-04-13 PROCEDURE — 99212 OFFICE O/P EST SF 10 MIN: CPT | Mod: 25 | Performed by: SURGERY

## 2022-04-13 PROCEDURE — 99396 PREV VISIT EST AGE 40-64: CPT | Performed by: SURGERY

## 2022-04-13 ASSESSMENT — ENCOUNTER SYMPTOMS
HEADACHES: 0
ACTIVITY CHANGE: 0
ABDOMINAL DISTENTION: 0
APPETITE CHANGE: 0
DIZZINESS: 0
NUMBNESS: 0
MYALGIAS: 0
APNEA: 0
COUGH: 0
TROUBLE SWALLOWING: 0
FATIGUE: 0
DIFFICULTY URINATING: 0
JOINT SWELLING: 0
WHEEZING: 0
VOMITING: 0
WEAKNESS: 0
SHORTNESS OF BREATH: 1
CHEST TIGHTNESS: 0
NERVOUS/ANXIOUS: 0
CONSTIPATION: 0
BLOOD IN STOOL: 0
NAUSEA: 0
FEVER: 0
PALPITATIONS: 1
VOICE CHANGE: 0
DIARRHEA: 0
BACK PAIN: 0
LIGHT-HEADEDNESS: 0
WOUND: 0
HEMATURIA: 0
SLEEP DISTURBANCE: 0
UNEXPECTED WEIGHT CHANGE: 0
ARTHRALGIAS: 0
ABDOMINAL PAIN: 0
DYSPHORIC MOOD: 0

## 2022-04-19 ENCOUNTER — TELEPHONE (OUTPATIENT)
Dept: FAMILY MEDICINE | Facility: CLINIC | Age: 62
End: 2022-04-19
Payer: COMMERCIAL

## 2022-04-19 NOTE — TELEPHONE ENCOUNTER
Left pt a detailed vm informing her that shingrix vaccines are now in the office and that she can call the office to schedule a diagnostic visit to have this vaccine done.

## 2022-05-26 ENCOUNTER — TELEPHONE (OUTPATIENT)
Dept: CARDIOLOGY | Facility: CLINIC | Age: 62
End: 2022-05-26
Payer: COMMERCIAL

## 2022-05-26 ENCOUNTER — OFFICE VISIT (OUTPATIENT)
Dept: ENDOCRINOLOGY | Facility: CLINIC | Age: 62
End: 2022-05-26
Payer: COMMERCIAL

## 2022-05-26 ENCOUNTER — APPOINTMENT (OUTPATIENT)
Dept: LAB | Facility: HOSPITAL | Age: 62
End: 2022-05-26
Attending: INTERNAL MEDICINE
Payer: COMMERCIAL

## 2022-05-26 VITALS
BODY MASS INDEX: 19.93 KG/M2 | SYSTOLIC BLOOD PRESSURE: 120 MMHG | WEIGHT: 124 LBS | HEART RATE: 81 BPM | HEIGHT: 66 IN | DIASTOLIC BLOOD PRESSURE: 80 MMHG

## 2022-05-26 DIAGNOSIS — E78.5 DYSLIPIDEMIA: ICD-10-CM

## 2022-05-26 DIAGNOSIS — E04.1 THYROID NODULE: Primary | ICD-10-CM

## 2022-05-26 DIAGNOSIS — E83.42 HYPOMAGNESEMIA: ICD-10-CM

## 2022-05-26 LAB
ALBUMIN SERPL-MCNC: 4.3 G/DL (ref 3.4–5)
ALP SERPL-CCNC: 83 IU/L (ref 35–126)
ALT SERPL-CCNC: 23 IU/L (ref 11–54)
ANION GAP SERPL CALC-SCNC: 9 MEQ/L (ref 3–15)
AST SERPL-CCNC: 21 IU/L (ref 15–41)
BASOPHILS # BLD: 0.05 K/UL (ref 0.01–0.1)
BASOPHILS NFR BLD: 0.6 %
BILIRUB SERPL-MCNC: 0.6 MG/DL (ref 0.3–1.2)
BUN SERPL-MCNC: 25 MG/DL (ref 8–20)
CALCIUM SERPL-MCNC: 10.7 MG/DL (ref 8.9–10.3)
CHLORIDE SERPL-SCNC: 99 MEQ/L (ref 98–109)
CHOLEST SERPL-MCNC: 141 MG/DL
CK SERPL-CCNC: 88 U/L (ref 15–200)
CO2 SERPL-SCNC: 28 MEQ/L (ref 22–32)
CREAT SERPL-MCNC: 1.3 MG/DL (ref 0.6–1.1)
DIFFERENTIAL METHOD BLD: ABNORMAL
EOSINOPHIL # BLD: 0.33 K/UL (ref 0.04–0.36)
EOSINOPHIL NFR BLD: 4 %
ERYTHROCYTE [DISTWIDTH] IN BLOOD BY AUTOMATED COUNT: 13.5 % (ref 11.7–14.4)
GFR SERPL CREATININE-BSD FRML MDRD: 41.4 ML/MIN/1.73M*2
GLUCOSE SERPL-MCNC: 101 MG/DL (ref 70–99)
HCT VFR BLDCO AUTO: 38.7 % (ref 35–45)
HDLC SERPL-MCNC: 79 MG/DL
HDLC SERPL: 1.8 {RATIO}
HGB BLD-MCNC: 13.1 G/DL (ref 11.8–15.7)
IMM GRANULOCYTES # BLD AUTO: 0.03 K/UL (ref 0–0.08)
IMM GRANULOCYTES NFR BLD AUTO: 0.4 %
LDLC SERPL CALC-MCNC: 48 MG/DL
LYMPHOCYTES # BLD: 1.92 K/UL (ref 1.2–3.5)
LYMPHOCYTES NFR BLD: 23.5 %
MAGNESIUM SERPL-MCNC: 2.2 MG/DL (ref 1.8–2.5)
MCH RBC QN AUTO: 30.1 PG (ref 28–33.2)
MCHC RBC AUTO-ENTMCNC: 33.9 G/DL (ref 32.2–35.5)
MCV RBC AUTO: 89 FL (ref 83–98)
MONOCYTES # BLD: 0.46 K/UL (ref 0.28–0.8)
MONOCYTES NFR BLD: 5.6 %
NEUTROPHILS # BLD: 5.38 K/UL (ref 1.7–7)
NEUTS SEG NFR BLD: 65.9 %
NONHDLC SERPL-MCNC: 62 MG/DL
NRBC BLD-RTO: 0 %
PDW BLD AUTO: 10 FL (ref 9.4–12.3)
PLATELET # BLD AUTO: 375 K/UL (ref 150–369)
POTASSIUM SERPL-SCNC: 5.3 MEQ/L (ref 3.6–5.1)
PROT SERPL-MCNC: 7 G/DL (ref 6–8.2)
RBC # BLD AUTO: 4.35 M/UL (ref 3.93–5.22)
SODIUM SERPL-SCNC: 136 MEQ/L (ref 136–144)
TRIGL SERPL-MCNC: 68 MG/DL (ref 30–149)
TSH SERPL DL<=0.05 MIU/L-ACNC: 2.18 MIU/L (ref 0.34–5.6)
WBC # BLD AUTO: 8.17 K/UL (ref 3.8–10.5)

## 2022-05-26 PROCEDURE — 80053 COMPREHEN METABOLIC PANEL: CPT

## 2022-05-26 PROCEDURE — 99205 OFFICE O/P NEW HI 60 MIN: CPT | Performed by: INTERNAL MEDICINE

## 2022-05-26 PROCEDURE — 82550 ASSAY OF CK (CPK): CPT

## 2022-05-26 PROCEDURE — 85025 COMPLETE CBC W/AUTO DIFF WBC: CPT

## 2022-05-26 PROCEDURE — 83735 ASSAY OF MAGNESIUM: CPT

## 2022-05-26 PROCEDURE — 80061 LIPID PANEL: CPT

## 2022-05-26 PROCEDURE — 36415 COLL VENOUS BLD VENIPUNCTURE: CPT

## 2022-05-26 PROCEDURE — 84443 ASSAY THYROID STIM HORMONE: CPT

## 2022-05-26 RX ORDER — SPIRONOLACTONE 25 MG/1
25 TABLET ORAL DAILY
COMMUNITY
End: 2022-05-31

## 2022-05-26 NOTE — PATIENT INSTRUCTIONS
- Thyroid nodule did not grow since last ultrasound done in 2010    - today the nodule does not look suspicious    - no indication to repeat the biopsy    - you are getting the thyroid levels done with labs ordered by Dr. Hall    Follow up in one year, will do office ultrasound.

## 2022-05-26 NOTE — PROGRESS NOTES
Endocrinology Consultation Report     Patient Name: Brian Dean  MR#: [id number]  : 1960  Consultant: Marleni Shipman MD      Brian Dean is a 61 y.o. female who presents for evaluation for thyroid nodules    HPI:  She was evaluated by endocrinologist in her 40's for thyroid nodules, she saw Dr. Teixeira who performed a thyroid biopsy in the office and it was benign. She did not have follow up after that.     Recently CT Done at [facility] on 22 showed There is heterogeneous hypodensity involving the right thyroid lobe.     In the past month, she noted tightening over the right neck/sternocleidomastoid, now resolved.   No increased neck swelling, dysphagia or choking sensation. No voice changes. No history of head and neck radiation.   No FH of thyroid cancer. No FH of thyroid nodules/goiter or thyroid surgery.   She never took thyroid medications.     She has history of fibromuscular dysplasia, followed by cardiology at [facility] [facility] in [place].  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  She is also status post CABG x 1.      Medical History:  Past Medical History:   Diagnosis Date   • Allergies    • Anxiety    • Asthma    • Coronary artery disease    • Coronary artery dissection    • Heart disease    • Lipid disorder    • NSTEMI (non-ST elevated myocardial infarction) (CMS/HCC)    • Parvovirus B19 infection        Surgical History:   Past Surgical History:   Procedure Laterality Date   • CORONARY ARTERY BYPASS GRAFT  2021    robotic - single bypass   • REDUCTION MAMMAPLASTY     • REDUCTION MAMMAPLASTY         Family History:  Family History   Problem Relation Age of Onset   • Heart disease Biological Mother    • Hypertension Biological Mother    • Atrial fibrillation Biological Mother    • Diabetes Biological Mother    • Heart disease Biological Father    • Heart disease Biological Brother          Social history:  Social History     Socioeconomic History   • Marital status:      Spouse name: Tc   • Number of children: 3   • Years of education: Not on file   • Highest education level: Not on file   Occupational History   • Occupation: self employed   Tobacco Use   • Smoking status: Never Smoker   • Smokeless tobacco: Never Used   Vaping Use   • Vaping Use: Never used   Substance and Sexual Activity   • Alcohol use: Never   • Drug use: Never   • Sexual activity: Defer   Other Topics Concern   • Not on file   Social History Narrative   • Not on file     Social Determinants of Health     Financial Resource Strain: Not on file   Food Insecurity: No Food Insecurity   • Worried About Running Out of Food in the Last Year: Never true   • Ran Out of Food in the Last Year: Never true   Transportation Needs: Not on file   Physical Activity: Not on file   Stress: Not on file   Social Connections: Not on file   Intimate Partner Violence: Not on file   Housing Stability: Not on file       Medication(active prior to today):  Current Outpatient Medications   Medication Sig Dispense Refill   • albuterol HFA (VENTOLIN HFA) 90 mcg/actuation inhaler as needed.     • aspirin 81 mg enteric coated tablet Take 81 mg by mouth daily.     • atorvastatin (LIPITOR) 40 mg tablet Take 1 tablet (40 mg total) by mouth nightly. 90 tablet 3   • buPROPion XL (WELLBUTRIN XL) 300 mg 24 hr tablet Take 300 mg by mouth daily.     • diphenhydrAMINE (BENADRYL) 25 mg capsule Take 1 tablet by mouth nightly.       • DULoxetine (CYMBALTA) 30 mg capsule Take 90 mg by mouth daily.       • eszopiclone (LUNESTA) 3 mg tablet Take 3 mg by mouth nightly.     • LORazepam (ATIVAN) 0.5 mg tablet Take 0.5 mg by mouth as needed.     • magnesium oxide (MAG-OX) 400 mg (241.3 mg magnesium) tablet Take 1 tablet (400 mg total) by mouth 2 (two) times a day. 180 tablet 3   • metoprolol succinate XL (TOPROL-XL) 25 mg 24 hr tablet Take 0.5 tablets (12.5 mg  "total) by mouth daily. 45 tablet 3   • montelukast (SINGULAIR) 10 mg tablet Take 1 tablet by mouth nightly.     • spironolactone (ALDACTONE) 25 mg tablet Take 25 mg by mouth daily.     • azelastine (ASTELIN) 137 mcg (0.1 %) nasal spray as needed.       No current facility-administered medications for this visit.       Allergies:  Sulfa (sulfonamide antibiotics) and Sulfamethoxazole-trimethoprim    Review of Systems  All other systems reviewed and negative except as noted in HPI    Vitals:    05/26/22 0806   BP: 120/80   BP Location: Left upper arm   Patient Position: Sitting   Pulse: 81   Weight: 56.2 kg (124 lb)   Height: 1.676 m (5' 6\")     Body mass index is 20.01 kg/m².    Physical Exam  Constitutional:       Appearance: She is well-developed.   HENT:      Head: Normocephalic and atraumatic.   Eyes:      Conjunctiva/sclera: Conjunctivae normal.      Pupils: Pupils are equal, round, and reactive to light.   Neck:      Thyroid: No thyromegaly.      Trachea: No tracheal deviation.   Cardiovascular:      Rate and Rhythm: Normal rate and regular rhythm.   Pulmonary:      Effort: Pulmonary effort is normal.      Breath sounds: Normal breath sounds.   Lymphadenopathy:      Cervical: No cervical adenopathy.   Skin:     Findings: No rash.   Neurological:      Motor: No weakness.   Psychiatric:         Mood and Affect: Mood normal.         Behavior: Behavior normal.         Lab Results   Component Value Date    HGBA1C 5.5 07/14/2021    CREATININE 1.3 (H) 05/26/2022    K 5.3 (H) 05/26/2022     Lab Results   Component Value Date    TSH 2.18 05/26/2022    TSH 2.33 07/14/2021    FREET4 1.07 07/14/2021    TRIG 68 05/26/2022    TRIG 73 07/14/2021    CHOL 141 05/26/2022    CHOL 150 07/14/2021    ALT 23 05/26/2022     Results from last 7 days   Lab Units 05/26/22  0939   SODIUM mEQ/L 136   POTASSIUM mEQ/L 5.3*   CHLORIDE mEQ/L 99   CO2 mEQ/L 28   BUN mg/dL 25*   CREATININE mg/dL 1.3*   CALCIUM mg/dL 10.7*   ALBUMIN g/dL 4.3 "   BILIRUBIN TOTAL mg/dL 0.6   ALK PHOS IU/L 83   ALT IU/L 23   AST IU/L 21   GLUCOSE mg/dL 101*     CT ANGIO NECK W CONTRAST 2/8/22  Formatting of this note might be different from the original.   INDICATION:  Evaluate for aneurysms/ dissections , known history of   spontaneous coronary dissection     TECHNIQUE: Volumetric CT angiographic acquisition was performed   vertex of the head through the neck to the thoracic inlet in arterial   phase.  Dose optimization techniques were utilized including kVp/mA   modulation along with iterative reconstructions.  3D and/or MIP   (maximum intensity projections) Image reconstruction was provided.   NASCET criteria was utilized for internal carotid artery origin   stenosis measurement.     Radiation dose estimate (DLP): 382.37 mGym2     Intravenous Contrast: Administered 75.0 ml of ISOVUE 370 Delivered   25.0 ml of saline.     COMPARISON: No prior studies are submitted..     FINDINGS: Atherosclerotic vascular calcifications are noted.    The petrous and cavernous internal carotid artery segments are   patent. The anterior and middle cerebral artery complexes demonstrate   flow without evidence of large vessel occlusion. The intradural   vertebral, basilar and posterior cerebral arteries are patent.     3 mm inferior projecting left supraclinoid ICA aneurysm (series 2   image 405).         Right carotid bifurcation: No significant stenosis.  0% stenosis of   the internal carotid artery origin. Normal distal ICA caliber is  4.3   mm.     Left carotid bifurcation: No significant stenosis.  0% stenosis of   the internal carotid artery origin. Normal distal ICA caliber is     4.4 mm.       OTHER:     Biapical lung scarring. Degenerative changes of the cervical spine.   There is heterogeneous hypodensity involving the right thyroid lobe.   If clinically warranted, dedicated thyroid sonography can be obtained   to further assess.     IMPRESSION   IMPRESSION:       3 mm inferior  projecting left supraclinoid ICA aneurysm. No evidence   of large vessel cervical/intracranial arterial occlusion. No evidence   of cervical arterial dissection. Please see above for additional   findings.         THYROID ULTRASOUND 5/13/2010 ([place])  Thyroid ultrasound history: Enlarged thyroid.     The left lobe measures 4.4 x 0.9 x 1.2 cm. The right measures 4.2 x 1.3 x 1.4 cm. The isthmus measures 3 mm. On the right, there    is a predominantly solid complex nodule measuring 1.4 x 0.6 x 0.9 cm.  within the mid lobe. A 4 x 3 x 4 mm complex nodule is   noted superiorly. On the left, an upper pole complex nodule measuring 5 x 3 x 4 mm is present.     Impression: There are bilateral complex, predominantly solid nodules. Followup examination is recommended.         OFFICE THYROID ULTRASOUND 5/26/22  Thyroid is normal in size  Normal texture  Right thyroid nodule, well circumscribed, isoechoic, no microcalcifications, measures, 0.92x0.97x1.24 cm.     Mostly stable compared to measurements on ultrasound in 2010    Assessment/Plan     Problem List Items Addressed This Visit        Endocrine/Metabolic    Thyroid nodule - Primary        Patient Instructions   - Thyroid nodule did not grow since last ultrasound done in 2010    - today the nodule does not look suspicious    - no indication to repeat the biopsy    - you are getting the thyroid levels done with labs ordered by Dr. Hall    Follow up in one year, will do office ultrasound.         I spent 60 minutes on this date of service performing the following activities: obtaining history, performing examination, entering orders, documenting, preparing for visit, obtaining / reviewing records, providing counseling and education, independently reviewing study/studies and coordinating care.    Marleni Shipman MD

## 2022-05-26 NOTE — TELEPHONE ENCOUNTER
I left  on home/cell #'s to call back to review lab results.  Bun/creat of 25/1.3 and potassium 5.3.  Prior creat 1.0 and potassium 4.1  Calcium level of 10.7-will forward copy to Dr. Shipman  Patient has been taking spironolactone 12.5 mg daily-new to her regimen since 3/22.  Will have her decrease dose to 12.5 mg and repeat bmp in 1-2 months.

## 2022-05-26 NOTE — LETTER
May 26, 2022     Leeann Ch MD  [address]  [address]  [place] [id number]    Patient: Brian Dean  YOB: 1960  Date of Visit: 2022      Dear Dr. Osiris Ch:    Thank you for referring Brian Dean to me for evaluation. Below are my notes for this consultation.    If you have questions, please do not hesitate to call me. I look forward to following your patient along with you.         Sincerely,        Marleni Shipman MD        CC: No Recipients  Marleni Shipman MD  2022 10:57 PM  Signed         Endocrinology Consultation Report     Patient Name: Brian Dean  MR#: [id number]  : 1960  Consultant: Marleni Shipman MD      Brian Dean is a 61 y.o. female who presents for evaluation for thyroid nodules    HPI:  She was evaluated by endocrinologist in her 40's for thyroid nodules, she saw Dr. Teixeira who performed a thyroid biopsy in the office and it was benign. She did not have follow up after that.     Recently CT Done at [facility] system on 22 showed There is heterogeneous hypodensity involving the right thyroid lobe.     In the past month, she noted tightening over the right neck/sternocleidomastoid, now resolved.   No increased neck swelling, dysphagia or choking sensation. No voice changes. No history of head and neck radiation.   No FH of thyroid cancer. No FH of thyroid nodules/goiter or thyroid surgery.   She never took thyroid medications.     She has history of fibromuscular dysplasia, followed by cardiology at [place] in [place].  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  She is also status post CABG x 1.      Medical History:  Past Medical History:   Diagnosis Date   • Allergies    • Anxiety    • Asthma    • Coronary artery disease    • Coronary artery dissection    • Heart disease    • Lipid disorder    • NSTEMI (non-ST elevated myocardial  infarction) (CMS/HCC)    • Parvovirus B19 infection        Surgical History:   Past Surgical History:   Procedure Laterality Date   • CORONARY ARTERY BYPASS GRAFT  07/16/2021    robotic - single bypass   • REDUCTION MAMMAPLASTY     • REDUCTION MAMMAPLASTY         Family History:  Family History   Problem Relation Age of Onset   • Heart disease Biological Mother    • Hypertension Biological Mother    • Atrial fibrillation Biological Mother    • Diabetes Biological Mother    • Heart disease Biological Father    • Heart disease Biological Brother         Social history:  Social History     Socioeconomic History   • Marital status:      Spouse name: Tc   • Number of children: 3   • Years of education: Not on file   • Highest education level: Not on file   Occupational History   • Occupation: self employed   Tobacco Use   • Smoking status: Never Smoker   • Smokeless tobacco: Never Used   Vaping Use   • Vaping Use: Never used   Substance and Sexual Activity   • Alcohol use: Never   • Drug use: Never   • Sexual activity: Defer   Other Topics Concern   • Not on file   Social History Narrative   • Not on file     Social Determinants of Health     Financial Resource Strain: Not on file   Food Insecurity: No Food Insecurity   • Worried About Running Out of Food in the Last Year: Never true   • Ran Out of Food in the Last Year: Never true   Transportation Needs: Not on file   Physical Activity: Not on file   Stress: Not on file   Social Connections: Not on file   Intimate Partner Violence: Not on file   Housing Stability: Not on file       Medication(active prior to today):  Current Outpatient Medications   Medication Sig Dispense Refill   • albuterol HFA (VENTOLIN HFA) 90 mcg/actuation inhaler as needed.     • aspirin 81 mg enteric coated tablet Take 81 mg by mouth daily.     • atorvastatin (LIPITOR) 40 mg tablet Take 1 tablet (40 mg total) by mouth nightly. 90 tablet 3   • buPROPion XL (WELLBUTRIN XL) 300 mg 24 hr  "tablet Take 300 mg by mouth daily.     • diphenhydrAMINE (BENADRYL) 25 mg capsule Take 1 tablet by mouth nightly.       • DULoxetine (CYMBALTA) 30 mg capsule Take 90 mg by mouth daily.       • eszopiclone (LUNESTA) 3 mg tablet Take 3 mg by mouth nightly.     • LORazepam (ATIVAN) 0.5 mg tablet Take 0.5 mg by mouth as needed.     • magnesium oxide (MAG-OX) 400 mg (241.3 mg magnesium) tablet Take 1 tablet (400 mg total) by mouth 2 (two) times a day. 180 tablet 3   • metoprolol succinate XL (TOPROL-XL) 25 mg 24 hr tablet Take 0.5 tablets (12.5 mg total) by mouth daily. 45 tablet 3   • montelukast (SINGULAIR) 10 mg tablet Take 1 tablet by mouth nightly.     • spironolactone (ALDACTONE) 25 mg tablet Take 25 mg by mouth daily.     • azelastine (ASTELIN) 137 mcg (0.1 %) nasal spray as needed.       No current facility-administered medications for this visit.       Allergies:  Sulfa (sulfonamide antibiotics) and Sulfamethoxazole-trimethoprim    Review of Systems  All other systems reviewed and negative except as noted in HPI    Vitals:    05/26/22 0806   BP: 120/80   BP Location: Left upper arm   Patient Position: Sitting   Pulse: 81   Weight: 56.2 kg (124 lb)   Height: 1.676 m (5' 6\")     Body mass index is 20.01 kg/m².    Physical Exam  Constitutional:       Appearance: She is well-developed.   HENT:      Head: Normocephalic and atraumatic.   Eyes:      Conjunctiva/sclera: Conjunctivae normal.      Pupils: Pupils are equal, round, and reactive to light.   Neck:      Thyroid: No thyromegaly.      Trachea: No tracheal deviation.   Cardiovascular:      Rate and Rhythm: Normal rate and regular rhythm.   Pulmonary:      Effort: Pulmonary effort is normal.      Breath sounds: Normal breath sounds.   Lymphadenopathy:      Cervical: No cervical adenopathy.   Skin:     Findings: No rash.   Neurological:      Motor: No weakness.   Psychiatric:         Mood and Affect: Mood normal.         Behavior: Behavior normal.         Lab " Results   Component Value Date    HGBA1C 5.5 07/14/2021    CREATININE 1.3 (H) 05/26/2022    K 5.3 (H) 05/26/2022     Lab Results   Component Value Date    TSH 2.18 05/26/2022    TSH 2.33 07/14/2021    FREET4 1.07 07/14/2021    TRIG 68 05/26/2022    TRIG 73 07/14/2021    CHOL 141 05/26/2022    CHOL 150 07/14/2021    ALT 23 05/26/2022     Results from last 7 days   Lab Units 05/26/22  0939   SODIUM mEQ/L 136   POTASSIUM mEQ/L 5.3*   CHLORIDE mEQ/L 99   CO2 mEQ/L 28   BUN mg/dL 25*   CREATININE mg/dL 1.3*   CALCIUM mg/dL 10.7*   ALBUMIN g/dL 4.3   BILIRUBIN TOTAL mg/dL 0.6   ALK PHOS IU/L 83   ALT IU/L 23   AST IU/L 21   GLUCOSE mg/dL 101*     CT ANGIO NECK W CONTRAST 2/8/22  Formatting of this note might be different from the original.   INDICATION:  Evaluate for aneurysms/ dissections , known history of   spontaneous coronary dissection     TECHNIQUE: Volumetric CT angiographic acquisition was performed   vertex of the head through the neck to the thoracic inlet in arterial   phase.  Dose optimization techniques were utilized including kVp/mA   modulation along with iterative reconstructions.  3D and/or MIP   (maximum intensity projections) Image reconstruction was provided.   NASCET criteria was utilized for internal carotid artery origin   stenosis measurement.     Radiation dose estimate (DLP): 382.37 mGym2     Intravenous Contrast: Administered 75.0 ml of ISOVUE 370 Delivered   25.0 ml of saline.     COMPARISON: No prior studies are submitted..     FINDINGS: Atherosclerotic vascular calcifications are noted.    The petrous and cavernous internal carotid artery segments are   patent. The anterior and middle cerebral artery complexes demonstrate   flow without evidence of large vessel occlusion. The intradural   vertebral, basilar and posterior cerebral arteries are patent.     3 mm inferior projecting left supraclinoid ICA aneurysm (series 2   image 405).         Right carotid bifurcation: No significant stenosis.   0% stenosis of   the internal carotid artery origin. Normal distal ICA caliber is  4.3   mm.     Left carotid bifurcation: No significant stenosis.  0% stenosis of   the internal carotid artery origin. Normal distal ICA caliber is     4.4 mm.       OTHER:     Biapical lung scarring. Degenerative changes of the cervical spine.   There is heterogeneous hypodensity involving the right thyroid lobe.   If clinically warranted, dedicated thyroid sonography can be obtained   to further assess.     IMPRESSION   IMPRESSION:       3 mm inferior projecting left supraclinoid ICA aneurysm. No evidence   of large vessel cervical/intracranial arterial occlusion. No evidence   of cervical arterial dissection. Please see above for additional   findings.         THYROID ULTRASOUND 5/13/2010 ([facility])  Thyroid ultrasound history: Enlarged thyroid.     The left lobe measures 4.4 x 0.9 x 1.2 cm. The right measures 4.2 x 1.3 x 1.4 cm. The isthmus measures 3 mm. On the right, there    is a predominantly solid complex nodule measuring 1.4 x 0.6 x 0.9 cm.  within the mid lobe. A 4 x 3 x 4 mm complex nodule is   noted superiorly. On the left, an upper pole complex nodule measuring 5 x 3 x 4 mm is present.     Impression: There are bilateral complex, predominantly solid nodules. Followup examination is recommended.         OFFICE THYROID ULTRASOUND 5/26/22  Thyroid is normal in size  Normal texture  Right thyroid nodule, well circumscribed, isoechoic, no microcalcifications, measures, 0.92x0.97x1.24 cm.     Mostly stable compared to measurements on ultrasound in 2010    Assessment/Plan     Problem List Items Addressed This Visit        Endocrine/Metabolic    Thyroid nodule - Primary        Patient Instructions   - Thyroid nodule did not grow since last ultrasound done in 2010    - today the nodule does not look suspicious    - no indication to repeat the biopsy    - you are getting the thyroid levels done with labs ordered by   Rafael    Follow up in one year, will do office ultrasound.         I spent 60 minutes on this date of service performing the following activities: obtaining history, performing examination, entering orders, documenting, preparing for visit, obtaining / reviewing records, providing counseling and education, independently reviewing study/studies and coordinating care.    Marleni Shipman MD

## 2022-05-27 NOTE — TELEPHONE ENCOUNTER
Pt calling to speak with Mariah about lab results.    Pt would like to receive a call back at [phone]

## 2022-05-27 NOTE — TELEPHONE ENCOUNTER
Spoke with patient.  Reviewed BS note regarding labs and spironolactone.  Patient states taking Spironolactone 100 mg daily.  This prescribed by  Dermatology/plastic surgery.  As discussed with BS CRNP:  Patient informed to follow up with  for spironolactone recommended dose.  Patient states she will fax results of K and Kidney function to .  Informed to follow up with  re: calcium.  Patient asked about elevated platelets,mildly elevated.  Informed can discuss with PCP.

## 2022-05-31 ENCOUNTER — TELEPHONE (OUTPATIENT)
Dept: CARDIOLOGY | Facility: CLINIC | Age: 62
End: 2022-05-31
Payer: COMMERCIAL

## 2022-05-31 ENCOUNTER — TELEPHONE (OUTPATIENT)
Dept: FAMILY MEDICINE | Facility: CLINIC | Age: 62
End: 2022-05-31
Payer: COMMERCIAL

## 2022-05-31 NOTE — TELEPHONE ENCOUNTER
Pt called to confirm refill is ordered for 3 30mg capsules per day. She states she spoke with Dr. Ch about these changes.

## 2022-05-31 NOTE — TELEPHONE ENCOUNTER
Medicine Refill Request    Last Office: 4/13/2022   Last Consult Visit: Visit date not found  Last Telemedicine Visit: Visit date not found    Next Appointment: Visit date not found      Current Outpatient Medications:   •  albuterol HFA (VENTOLIN HFA) 90 mcg/actuation inhaler, as needed., Disp: , Rfl:   •  aspirin 81 mg enteric coated tablet, Take 81 mg by mouth daily., Disp: , Rfl:   •  atorvastatin (LIPITOR) 40 mg tablet, Take 1 tablet (40 mg total) by mouth nightly., Disp: 90 tablet, Rfl: 3  •  azelastine (ASTELIN) 137 mcg (0.1 %) nasal spray, as needed., Disp: , Rfl:   •  buPROPion XL (WELLBUTRIN XL) 300 mg 24 hr tablet, Take 300 mg by mouth daily., Disp: , Rfl:   •  diphenhydrAMINE (BENADRYL) 25 mg capsule, Take 1 tablet by mouth nightly.  , Disp: , Rfl:   •  DULoxetine (CYMBALTA) 30 mg capsule, Take 90 mg by mouth daily.  , Disp: , Rfl:   •  eszopiclone (LUNESTA) 3 mg tablet, Take 3 mg by mouth nightly., Disp: , Rfl:   •  LORazepam (ATIVAN) 0.5 mg tablet, Take 0.5 mg by mouth as needed., Disp: , Rfl:   •  magnesium oxide (MAG-OX) 400 mg (241.3 mg magnesium) tablet, Take 1 tablet (400 mg total) by mouth 2 (two) times a day., Disp: 180 tablet, Rfl: 3  •  metoprolol succinate XL (TOPROL-XL) 25 mg 24 hr tablet, Take 0.5 tablets (12.5 mg total) by mouth daily., Disp: 45 tablet, Rfl: 3  •  montelukast (SINGULAIR) 10 mg tablet, Take 1 tablet by mouth nightly., Disp: , Rfl:       BP Readings from Last 3 Encounters:   05/26/22 120/80   04/13/22 112/82   03/08/22 106/72       Recent Lab results:  Lab Results   Component Value Date    CHOL 141 05/26/2022   ,   Lab Results   Component Value Date    HDL 79 05/26/2022   ,   Lab Results   Component Value Date    LDLCALC 48 05/26/2022   ,   Lab Results   Component Value Date    TRIG 68 05/26/2022        Lab Results   Component Value Date    GLUCOSE 101 (H) 05/26/2022   ,   Lab Results   Component Value Date    HGBA1C 5.5 07/14/2021         Lab Results   Component Value Date     CREATININE 1.3 (H) 05/26/2022       Lab Results   Component Value Date    TSH 2.18 05/26/2022

## 2022-05-31 NOTE — TELEPHONE ENCOUNTER
I called and left VM to call back to discuss dose of spironolactone.  I d/w Dr. Hall who would like her to decrease dose of spironolactone to 25 mg

## 2022-05-31 NOTE — TELEPHONE ENCOUNTER
Pt called requesting a call back to discuss her recent lab results. She states her results shows high platelets and she has some concerns.

## 2022-06-01 RX ORDER — DULOXETIN HYDROCHLORIDE 30 MG/1
CAPSULE, DELAYED RELEASE ORAL
Qty: 270 CAPSULE | Refills: 3 | Status: SHIPPED | OUTPATIENT
Start: 2022-06-01 | End: 2022-06-06 | Stop reason: SDUPTHER

## 2022-06-02 ENCOUNTER — TELEPHONE (OUTPATIENT)
Dept: FAMILY MEDICINE | Facility: CLINIC | Age: 62
End: 2022-06-02
Payer: COMMERCIAL

## 2022-06-02 NOTE — TELEPHONE ENCOUNTER
Responded to Isabel - will try to schedule telehealth for Monday as I am out of the office today and am full tomorrow.

## 2022-06-02 NOTE — TELEPHONE ENCOUNTER
Pt has FMD & requesting a return call from Dr Ch to review abnormal labs.Pt stated that her Cardiologolist advised her to reach out to Dr Ch to discuss abnormal platelet count & is very anxious.    Pt contact [phone]

## 2022-06-02 NOTE — TELEPHONE ENCOUNTER
I am not in the office today and my schedule is full tomorrow. Can we please look to Monday for a telehealth visit?

## 2022-06-05 PROBLEM — D75.839 THROMBOCYTOSIS: Status: ACTIVE | Noted: 2022-06-05

## 2022-06-05 NOTE — PROGRESS NOTES
Verification of Patient Location:  The patient affirms they are currently located in the following state: [place]    Request for Consent:    Audio and Video Encounter   Javon, my name is Leeann Ch MD.  Before we proceed, can you please verify your identification by telling me your full name and date of birth?  Can you tell me who is in the room with you?    You and I are about to have a telemedicine check-in or visit because you have requested it.  This is a live video-conference.  I am a real person, speaking to you in real time.  There is no one else with me on the video-conference.  However, when we use ([employer], [employer], etc) it is important for you to know that the video-conference may not be secure or private.  I am not recording this conversation and I am asking you not to record it.  This telemedicine visit will be billed to your health insurance or you, if you are self-insured.  You understand you will be responsible for any copayments or coinsurances that apply to your telemedicine visit.  Communication platform used for this encounter:  WEALTH at work Video Visit (no Zoom)   ropriate in this visit:  Before starting our telemedicine visit, I am required to get your consent for this virtual check-in or visit by telemedicine. Do you consent?      Patient Response to Request for Consent:  Yes      Visit Documentation:  Subjective     Patient ID: Brian Dean is a 61 y.o. female.  1960    PMH- Fibromuscular dysplasia (new diagnosis), h/o Supraclinoid internal cerebral artery aneurysm, NSTEMI 2/2 spontaneous coronary artery dissection 2005, SCD of her LAD 7/2021 s/p CABG x1, Asthma, PID, Thyroid nodules, Hyperlipidemia.    HPI  Acute visit - lab results.  She recently had labs performed as ordered by her Cardiologist, Dr. Hall. Her CBC revealed a mildly elevated platelet count (375), trend: 168--> 236 --> 416 --> 375.   She is very worried about this result.   She has not discussed the  platelet elevation with her Cardiology specialist, but was informed by Dr. Hall to discuss it further with her PCP.    Other labs:  -Thyroid function normal, her Endocrinologist (Dr. Shipman) is aware.  -Glucose 101, fasted.  -BUN 25/Crea 1.3  -Potassium 5.3  -Liver healthy, lipids at goal (LDL 48)  She offers she was on aldactone for hair regrowth at the time of these labs.            Review of Systems  Erythromelalgia (burning pain/redness hands/feet)- sometimes has burning of the bottom of her feet.   Flushing- Yes.  Pruritis- No  Fever- No  Sweats- Profuse when outside.  Unexplained weight loss- Stable.    Assessment/Plan   Diagnoses and all orders for this visit:    Thrombocytosis (Primary)  Assessment & Plan:  Will first start by repeating the CBC and differential, along with check blood smear to r/o concerns for MPN.     We discussed how thrombocytosis is often reactive, with anemia, infection, inflammation as common causes. FMD is not considered to be an inflammatory disorder of the blood vessels and I am unaware of whether there is an association. I encouraged her to discuss this more fully with her specialists.    I will also check iron studies, ESR and CRP to evaluate for anemia and rule-out an underlying inflammatory cause     Rarely, malignant causes (myeloproliferative disorders) and inherited conditions are the cause. She has no ** family history of elevated platelets. If any abnormal findings on her blood smear, or if her thrombocytosis is persistent without a clear cause, will have her evaluated by Hematology.    There are no medications that are likely causes.    Orders:  -     CBC and Differential, w/ Path Review (includes Peripheral Smear); Future  -     Sedimentation rate; Future  -     C-reactive protein; Future  -     Iron and TIBC; Future  -     Ferritin; Future    Elevated fasting glucose  -     Basic metabolic panel; Future    Hyperkalemia  -     Basic metabolic panel; Future    Elevated  serum creatinine  -     Basic metabolic panel; Future    Anxiety  -     eszopiclone (LUNESTA) 3 mg tablet; Take 1 tablet (3 mg total) by mouth nightly.  -     DULoxetine (CYMBALTA) 30 mg capsule; Take 3 capsules (90 mg total) by mouth once daily.  -     buPROPion XL (WELLBUTRIN XL) 150 mg 24 hr tablet; Take 1 tablet (150 mg total) by mouth daily.    Primary insomnia  -     eszopiclone (LUNESTA) 3 mg tablet; Take 1 tablet (3 mg total) by mouth nightly.    Impaired fasting glucose  -     Hemoglobin A1c; Future      Time Spent:  I spent 30 minutes on this date of service performing the following activities: documenting, preparing for visit, providing counseling and education and communicating results.

## 2022-06-05 NOTE — ASSESSMENT & PLAN NOTE
Will first start by repeating the CBC and differential, along with check blood smear to r/o concerns for MPN.     We discussed how thrombocytosis is often reactive, with anemia, infection, inflammation as common causes. FMD is not considered to be an inflammatory disorder of the blood vessels and I am unaware of whether there is an association. I encouraged her to discuss this more fully with her specialists.    I will also check iron studies, ESR and CRP to evaluate for anemia and rule-out an underlying inflammatory cause     Rarely, malignant causes (myeloproliferative disorders) and inherited conditions are the cause. She has no ** family history of elevated platelets. If any abnormal findings on her blood smear, or if her thrombocytosis is persistent without a clear cause, will have her evaluated by Hematology.    There are no medications that are likely causes.

## 2022-06-06 ENCOUNTER — TELEMEDICINE (OUTPATIENT)
Dept: FAMILY MEDICINE | Facility: CLINIC | Age: 62
End: 2022-06-06
Payer: COMMERCIAL

## 2022-06-06 DIAGNOSIS — R73.01 IMPAIRED FASTING GLUCOSE: ICD-10-CM

## 2022-06-06 DIAGNOSIS — F51.01 PRIMARY INSOMNIA: ICD-10-CM

## 2022-06-06 DIAGNOSIS — D75.839 THROMBOCYTOSIS: Primary | ICD-10-CM

## 2022-06-06 DIAGNOSIS — F41.9 ANXIETY: ICD-10-CM

## 2022-06-06 DIAGNOSIS — R79.89 ELEVATED SERUM CREATININE: ICD-10-CM

## 2022-06-06 DIAGNOSIS — E87.5 HYPERKALEMIA: ICD-10-CM

## 2022-06-06 DIAGNOSIS — R73.01 ELEVATED FASTING GLUCOSE: ICD-10-CM

## 2022-06-06 PROCEDURE — 99214 OFFICE O/P EST MOD 30 MIN: CPT | Mod: 95 | Performed by: SURGERY

## 2022-06-06 RX ORDER — ESZOPICLONE 3 MG/1
3 TABLET, FILM COATED ORAL NIGHTLY
Qty: 30 TABLET | Refills: 0 | Status: SHIPPED | OUTPATIENT
Start: 2022-06-06 | End: 2022-08-08

## 2022-06-06 RX ORDER — DULOXETIN HYDROCHLORIDE 30 MG/1
90 CAPSULE, DELAYED RELEASE ORAL
Qty: 270 CAPSULE | Refills: 3 | Status: SHIPPED | OUTPATIENT
Start: 2022-06-06 | End: 2023-06-19

## 2022-06-06 RX ORDER — BUPROPION HYDROCHLORIDE 150 MG/1
150 TABLET ORAL DAILY
Qty: 30 TABLET | Refills: 3 | Status: SHIPPED | OUTPATIENT
Start: 2022-06-06 | End: 2022-07-08

## 2022-06-06 NOTE — PATIENT INSTRUCTIONS
Ms. Dean,    It was nice seeing you today. As discussed, please plan to have your non-fasted blood work performed at [facility] lab approximately 4 weeks after your last lab draw. I suggest scheduling Telehealth follow-up around that time to discuss results and next steps.    Here is a weekly plan for tapering down your Wellbutrin 300 mg. For ease of tapering, I am sending through Wellbutrin 150 mg tablets instead of 300 mg tablets. Thus, you should take #2 tablets on days where the total dose is 300 mg; take #1 tablet on days where the total dose is 150 mg.    Week 1: Take Wellbutrin 300 mg M-W-F-Sunday. Take Wellbutrin 150 mg [id number].    Week 2: Take Wellbutrin 150 mg tablet M-W-F-Sunday. Take Wellbutrin 300 mg T-Th-Saturday.    Week 3: Take Wellbutrin 300 mg M-Saturday. Take Wellbutrin 150 mg the other days.    Week 4: Take Wellbutrin 150 mg daily.  Stop here and follow-up to discuss response and next steps.

## 2022-06-30 ENCOUNTER — APPOINTMENT (OUTPATIENT)
Dept: LAB | Facility: HOSPITAL | Age: 62
End: 2022-06-30
Attending: SURGERY
Payer: COMMERCIAL

## 2022-06-30 DIAGNOSIS — E87.5 HYPERKALEMIA: ICD-10-CM

## 2022-06-30 DIAGNOSIS — R73.01 IMPAIRED FASTING GLUCOSE: ICD-10-CM

## 2022-06-30 DIAGNOSIS — R79.89 ELEVATED SERUM CREATININE: ICD-10-CM

## 2022-06-30 DIAGNOSIS — D75.839 THROMBOCYTOSIS: ICD-10-CM

## 2022-06-30 DIAGNOSIS — R73.01 ELEVATED FASTING GLUCOSE: ICD-10-CM

## 2022-06-30 LAB
ANION GAP SERPL CALC-SCNC: 7 MEQ/L (ref 3–15)
BASOPHILS # BLD: 0.1 K/UL (ref 0.01–0.1)
BASOPHILS NFR BLD: 2 %
BUN SERPL-MCNC: 13 MG/DL (ref 8–20)
BURR CELLS BLD QL SMEAR: ABNORMAL
CALCIUM SERPL-MCNC: 10.3 MG/DL (ref 8.9–10.3)
CHLORIDE SERPL-SCNC: 100 MEQ/L (ref 98–109)
CO2 SERPL-SCNC: 29 MEQ/L (ref 22–32)
CREAT SERPL-MCNC: 1.1 MG/DL (ref 0.6–1.1)
CRP SERPL-MCNC: 8.1 MG/L
DIFFERENTIAL METHOD BLD: ABNORMAL
EOSINOPHIL # BLD: 0.41 K/UL (ref 0.04–0.36)
EOSINOPHIL NFR BLD: 8 %
ERYTHROCYTE [DISTWIDTH] IN BLOOD BY AUTOMATED COUNT: 13.5 % (ref 11.7–14.4)
ERYTHROCYTE [SEDIMENTATION RATE] IN BLOOD BY WESTERGREN METHOD: 36 MM/HR
EST. AVERAGE GLUCOSE BLD GHB EST-MCNC: 117 MG/DL
FERRITIN SERPL-MCNC: 70 NG/ML (ref 11–250)
GFR SERPL CREATININE-BSD FRML MDRD: 50.2 ML/MIN/1.73M*2
GLUCOSE SERPL-MCNC: 97 MG/DL (ref 70–99)
HBA1C MFR BLD HPLC: 5.7 %
HCT VFR BLDCO AUTO: 36.1 % (ref 35–45)
HGB BLD-MCNC: 11.7 G/DL (ref 11.8–15.7)
IRON SATN MFR SERPL: 38 % (ref 15–45)
IRON SERPL-MCNC: 120 UG/DL (ref 35–150)
LYMPHOCYTES # BLD: 1.45 K/UL (ref 1.2–3.5)
LYMPHOCYTES NFR BLD: 28 %
MCH RBC QN AUTO: 30.2 PG (ref 28–33.2)
MCHC RBC AUTO-ENTMCNC: 32.4 G/DL (ref 32.2–35.5)
MCV RBC AUTO: 93.3 FL (ref 83–98)
MONOCYTES # BLD: 0.16 K/UL (ref 0.28–0.8)
MONOCYTES NFR BLD: 3 %
NEUTS BAND # BLD: 2.95 K/UL (ref 1.7–7)
NEUTS SEG NFR BLD: 57 %
PDW BLD AUTO: 9.9 FL (ref 9.4–12.3)
PLAT MORPH BLD: NORMAL
PLATELET # BLD AUTO: 393 K/UL (ref 150–369)
PLATELET # BLD EST: ABNORMAL 10*3/UL
POLYCHROMASIA BLD QL SMEAR: ABNORMAL
POTASSIUM SERPL-SCNC: 5.5 MEQ/L (ref 3.6–5.1)
RBC # BLD AUTO: 3.87 M/UL (ref 3.93–5.22)
SODIUM SERPL-SCNC: 136 MEQ/L (ref 136–144)
TIBC SERPL-MCNC: 312 UG/DL (ref 270–460)
UIBC SERPL-MCNC: 192 UG/DL (ref 180–360)
VARIANT LYMPHS # BLD MANUAL: 0.1 K/UL
VARIANT LYMPHS NFR BLD: 2 %
WBC # BLD AUTO: 5.17 K/UL (ref 3.8–10.5)

## 2022-06-30 PROCEDURE — 83540 ASSAY OF IRON: CPT

## 2022-06-30 PROCEDURE — 82728 ASSAY OF FERRITIN: CPT

## 2022-06-30 PROCEDURE — 85652 RBC SED RATE AUTOMATED: CPT

## 2022-06-30 PROCEDURE — 86140 C-REACTIVE PROTEIN: CPT

## 2022-06-30 PROCEDURE — 83036 HEMOGLOBIN GLYCOSYLATED A1C: CPT

## 2022-06-30 PROCEDURE — 85025 COMPLETE CBC W/AUTO DIFF WBC: CPT

## 2022-06-30 PROCEDURE — 83550 IRON BINDING TEST: CPT

## 2022-06-30 PROCEDURE — 80048 BASIC METABOLIC PNL TOTAL CA: CPT

## 2022-06-30 PROCEDURE — 36415 COLL VENOUS BLD VENIPUNCTURE: CPT

## 2022-07-01 ENCOUNTER — TELEMEDICINE (OUTPATIENT)
Dept: FAMILY MEDICINE | Facility: CLINIC | Age: 62
End: 2022-07-01
Payer: COMMERCIAL

## 2022-07-01 DIAGNOSIS — N18.31 STAGE 3A CHRONIC KIDNEY DISEASE (CMS/HCC): ICD-10-CM

## 2022-07-01 DIAGNOSIS — D75.839 THROMBOCYTOSIS: Primary | ICD-10-CM

## 2022-07-01 DIAGNOSIS — E87.5 HYPERKALEMIA: ICD-10-CM

## 2022-07-01 DIAGNOSIS — D63.8 ANEMIA OF CHRONIC DISEASE: ICD-10-CM

## 2022-07-01 DIAGNOSIS — F41.9 ANXIETY: ICD-10-CM

## 2022-07-01 PROBLEM — Z00.00 ENCOUNTER FOR MEDICAL EXAMINATION TO ESTABLISH CARE: Status: RESOLVED | Noted: 2022-04-12 | Resolved: 2022-07-01

## 2022-07-01 PROCEDURE — 99215 OFFICE O/P EST HI 40 MIN: CPT | Mod: 95 | Performed by: SURGERY

## 2022-07-01 ASSESSMENT — ENCOUNTER SYMPTOMS
ABDOMINAL PAIN: 0
BLOOD IN STOOL: 0
FATIGUE: 1
CHILLS: 0
FEVER: 0
ACTIVITY CHANGE: 0
UNEXPECTED WEIGHT CHANGE: 0
HEMATURIA: 0

## 2022-07-01 NOTE — PROGRESS NOTES
Verification of Patient Location:  The patient affirms they are currently located in the following state: [place]    Request for Consent:    Audio and Video Encounter   Javon, my name is Leeann Ch MD.  Before we proceed, can you please verify your identification by telling me your full name and date of birth?  Can you tell me who is in the room with you?    You and I are about to have a telemedicine check-in or visit because you have requested it.  This is a live video-conference.  I am a real person, speaking to you in real time.  There is no one else with me on the video-conference.  However, when we use ([employer], [employer], etc) it is important for you to know that the video-conference may not be secure or private.  I am not recording this conversation and I am asking you not to record it.  This telemedicine visit will be billed to your health insurance or you, if you are self-insured.  You understand you will be responsible for any copayments or coinsurances that apply to your telemedicine visit.  Communication platform used for this encounter:  Vindi Video Visit (no Zoom)     Before starting our telemedicine visit, I am required to get your consent for this virtual check-in or visit by telemedicine. Do you consent?      Patient Response to Request for Consent:  Yes      Visit Documentation:  Subjective     Patient ID: Brian Dean is a 61 y.o. female.  1960      HPI   PMH- Fibromuscular dysplasia (new diagnosis), h/o Supraclinoid internal cerebral artery aneurysm, NSTEMI 2/2 spontaneous coronary artery dissection 2005, SCD of her LAD 7/2021 s/p CABG x1, Asthma, PID, Thyroid nodules, Hyperlipidemia.    Here for lab review:  -Pre-diabetes range Hba1c 5.7, fasted glucose 97 (101, 106)  -Potassium is elevated - mild hyperkalmeia  -Kidneys stable, Crea 1.1 (baseline 1-1.3), eGFR 50.2  -Slight anemia Hgb 11.7 (9-11, 13.1 one month ago), platelets slightly elevated. -thrombocytosis  eval?  -Iron studies normal. Ferritin normal. Therefore anemia of CKD  -Elev Sed rate 36 (normal 30); CRP 8.1 (normal 7.48)    Due now for colonoscopy, will schedule.  UTD with Pap smear.   UTD with mammogram.    Completed first 4 weeks of Wellbutrin wean and is about to start daily 150 mg dosing.  Feeling really well.   She inquires about next steps.     Allergies  Meds  Problems         Review of Systems   Constitutional: Positive for fatigue. Negative for activity change, chills, fever and unexpected weight change.   Gastrointestinal: Negative for abdominal pain and blood in stool.   Genitourinary: Negative for hematuria.       Assessment/Plan   Diagnoses and all orders for this visit:    Thrombocytosis (Primary)  Assessment & Plan:  Mild and I suspect may be reactive/due to anemia.     Inflammatory markers are only marginally elevated, thus, an underlying inflammatory cause is less likely.    Peripheral smear is unremarkable and she has no family history of elevated platelets making myeloproliferative disorder and inherited conditions less likely, respectively.    Given that the elevation is persistent, I recommend she see Hematology - referral provided.    Orders:  -     Ambulatory referral to Hematology; Future    Hyperkalemia  Assessment & Plan:  Mild and likely caused by chronic mild dehydration, as she struggles to drink an adequate amount of fluids. Does not take a potassium supplement, and uses NSAIDs rarely. I advised dietary changes and increasing hydration. If a continual problem, could consider a diuretic.          Anxiety  Assessment & Plan:  Doing well with Wellbutrin taper. Continue Cymbalta and Lunesta. Rarely using Ativan.      Anemia of chronic disease  Assessment & Plan:  Iron levels normal. Likely secondary to CKD.      Stage 3a chronic kidney disease (CMS/[facility])      Time Spent:  I spent 40 minutes on this date of service performing the following activities: entering orders, documenting,  providing counseling and education and coordinating care.

## 2022-07-01 NOTE — PATIENT INSTRUCTIONS
"I am referring you to see a Hematologist for your elevated platelet count.  Dr. Marcy Crane  [address], [place] [address]  Phone: [phone]    Dr. Mark Marcano is also great!  [address] [facility],  [place] [phone]    Below, please find the remaining steps of the Wellbutrin taper plan. Start with \"Week 4\" from your last taper plan. I can send in more 150 mg tablets for you as needed. If you do not want to cut these in half to achieve 75 mg, I can also send in the 75 mg dose. Please let me know.     Week 4: Take Wellbutrin 150 mg daily.    Week 5: Take 150 mg W-H-D-Sunday. Take Wellbutrin 75 mg (half 150 mg tablet) [id number].    Week 6: Take Wellbutrin 75 mg M-W-F-Sunday. Take Wellbutrin 150 mg [id number].    Week 7: Take Wellbutrin 150 mg M-Saturday. Take Wellbutrin 75 mg the other days.    Week 8: Take Wellbutrin 75 mg daily.    Week 9: Take Wellbutrin 75 mg every other day.    Week 10: STOP.    "

## 2022-07-01 NOTE — ASSESSMENT & PLAN NOTE
Mild and I suspect may be reactive/due to anemia.     Inflammatory markers are only marginally elevated, thus, an underlying inflammatory cause is less likely.    Peripheral smear is unremarkable and she has no family history of elevated platelets making myeloproliferative disorder and inherited conditions less likely, respectively.    Given that the elevation is persistent, I recommend she see Hematology - referral provided.

## 2022-07-01 NOTE — RESULT ENCOUNTER NOTE
Pre-diabetes range Hba1c 5.7, fasted glucose 97 (101, 106)  Potassium is elevated - repeat here today, med side effect?  Kidneys stable, Crea 1.1 (baseline 1-1.3), eGFR 50.2  Slight anemia Hgb 11.7 (9-11, 13.1 one month ago), platelets slightly elevated. -thrombocytosis eval?  Iron studies normal. Ferritin normal. Therefore anemia of CKD  Elev Sed rate 36 (normal 30); CRP 8.1 (normal 7.48)

## 2022-07-01 NOTE — ASSESSMENT & PLAN NOTE
Mild and likely caused by chronic mild dehydration, as she struggles to drink an adequate amount of fluids. Does not take a potassium supplement, and uses NSAIDs rarely. I advised dietary changes and increasing hydration. If a continual problem, could consider a diuretic.

## 2022-07-02 LAB — PATH REV BLD -IMP: NORMAL

## 2022-07-08 ENCOUNTER — TELEPHONE (OUTPATIENT)
Dept: FAMILY MEDICINE | Facility: CLINIC | Age: 62
End: 2022-07-08
Payer: COMMERCIAL

## 2022-07-08 DIAGNOSIS — F41.9 ANXIETY: ICD-10-CM

## 2022-07-08 RX ORDER — BUPROPION HYDROCHLORIDE 75 MG/1
75 TABLET ORAL DAILY
Qty: 90 TABLET | Refills: 3 | Status: SHIPPED | OUTPATIENT
Start: 2022-07-08 | End: 2022-07-10 | Stop reason: SDUPTHER

## 2022-07-08 NOTE — TELEPHONE ENCOUNTER
Dr. Hernandes, can you review ,pt's request for Wellbutrin to be 75 mg once a day. Pt states that Dr. Clayton was suppose to send in for 75 mg in replacement of the 150 mg due to pt not being able to cut [pill in half. It looks like when I go to place new order for Wellbutrin the 75 mg is not extended release where as the 150 mg is. Are you okay with clarifying for me in Dr. Ch absence and will it be okay for this switch?       As for the Fluoxetine needing a PA I will begin PA.

## 2022-07-08 NOTE — TELEPHONE ENCOUNTER
Pt called the office stating she is leaving for vacation out of the country on July 14th until August 1st. She needs a prior auth done for DULoxetine (CYMBALTA) 30 mg capsule. She also stated Dr. Ch was suppose to give her a prescription for Wellbutrin 75mg. She currently been taking 150mg but she is unable to cut the pill in half.

## 2022-07-10 RX ORDER — BUPROPION HYDROCHLORIDE 75 MG/1
75 TABLET ORAL DAILY
Qty: 90 TABLET | Refills: 0 | Status: SHIPPED | OUTPATIENT
Start: 2022-07-10 | End: 2022-09-07 | Stop reason: ALTCHOICE

## 2022-07-10 NOTE — TELEPHONE ENCOUNTER
----- Message from Joy López MA sent at 7/9/2022 10:34 PM EDT -----  Regarding: FW: Wellbutrin 75 mg  Are you able to assist with this?  ----- Message -----  From: Brian Dean  Sent: 7/8/2022   5:20 PM EDT  To: Sushma Pena [facility] Clinical Support [facility]  Subject: Wellbutrin 75 mg                                 I’m in the process of weaning off Wellbutrin. I’m currently taking 150 mg and per her directions I’m to start 75 mg next week! Therefore I need75. Mg . It’s written in her notes on my chart! So if I understand correctly she has no coverage while she is out. This is why I left my prior family . Also a prior authorization was faxed to your office twice this week for my refill on cymbalta and the pharmacy never received a response. I called the office earlier today about this. I’m going out of the country on Thursday and don’t return until August 1st. I will run out of my cymbalta while away.

## 2022-07-11 NOTE — TELEPHONE ENCOUNTER
Spoke with pt's drug coverage company and have notified her pharmacy. Sent pt a patient portal message response back. This has been taken care of already regarding PA for duloxetine. One is not needed at this time. Wellbutrin 75 mg was already sent to pt's pharmacy per her request.     Good Morning Brian,      I just got off the phone with your drug coverage company [employer]  and you already have a prior auth approval for the Duloxetine was approved back on 3/2/22 and does not  until 3/2/23.A new prior auth is not needed at this time. The issue is you are too soon to refill, you will be due to retrieve refill on 22 your last refill was 22  that is the soonest you are able to refill. I am aware that you stated you are going on vacation. However, your drug coverage company will not dispense an earlier refill. The earliest again that you can receive this refill is 22.      Have a great day     Thanks,   Betsy

## 2022-08-07 DIAGNOSIS — F51.01 PRIMARY INSOMNIA: ICD-10-CM

## 2022-08-07 DIAGNOSIS — F41.9 ANXIETY: ICD-10-CM

## 2022-08-08 RX ORDER — ESZOPICLONE 3 MG/1
TABLET, FILM COATED ORAL
Qty: 30 TABLET | Refills: 3 | Status: SHIPPED | OUTPATIENT
Start: 2022-08-08 | End: 2022-12-08

## 2022-08-08 NOTE — TELEPHONE ENCOUNTER
Medicine Refill Request    Last Office: 4/13/2022   Last Consult Visit: Visit date not found  Last Telemedicine Visit: 7/1/2022 Leeann Townsend MD    Next Appointment: Visit date not found      Current Outpatient Medications:   •  albuterol HFA (VENTOLIN HFA) 90 mcg/actuation inhaler, as needed., Disp: , Rfl:   •  aspirin 81 mg enteric coated tablet, Take 81 mg by mouth daily., Disp: , Rfl:   •  atorvastatin (LIPITOR) 40 mg tablet, Take 1 tablet (40 mg total) by mouth nightly., Disp: 90 tablet, Rfl: 3  •  azelastine (ASTELIN) 137 mcg (0.1 %) nasal spray, as needed., Disp: , Rfl:   •  buPROPion (WELLBUTRIN) 75 mg tablet, Take 1 tablet (75 mg total) by mouth daily., Disp: 90 tablet, Rfl: 0  •  DULoxetine (CYMBALTA) 30 mg capsule, Take 3 capsules (90 mg total) by mouth once daily., Disp: 270 capsule, Rfl: 3  •  eszopiclone (LUNESTA) 3 mg tablet, Take 1 tablet (3 mg total) by mouth nightly., Disp: 30 tablet, Rfl: 0  •  LORazepam (ATIVAN) 0.5 mg tablet, Take 0.5 mg by mouth as needed., Disp: , Rfl:   •  magnesium oxide (MAG-OX) 400 mg (241.3 mg magnesium) tablet, Take 1 tablet (400 mg total) by mouth 2 (two) times a day., Disp: 180 tablet, Rfl: 3  •  metoprolol succinate XL (TOPROL-XL) 25 mg 24 hr tablet, Take 0.5 tablets (12.5 mg total) by mouth daily., Disp: 45 tablet, Rfl: 3  •  montelukast (SINGULAIR) 10 mg tablet, Take 1 tablet by mouth nightly., Disp: , Rfl:       BP Readings from Last 3 Encounters:   05/26/22 120/80   04/13/22 112/82   03/08/22 106/72       Recent Lab results:  Lab Results   Component Value Date    CHOL 141 05/26/2022   ,   Lab Results   Component Value Date    HDL 79 05/26/2022   ,   Lab Results   Component Value Date    LDLCALC 48 05/26/2022   ,   Lab Results   Component Value Date    TRIG 68 05/26/2022        Lab Results   Component Value Date    GLUCOSE 97 06/30/2022   ,   Lab Results   Component Value Date    HGBA1C 5.7 (H) 06/30/2022         Lab Results   Component Value Date     CREATININE 1.1 06/30/2022       Lab Results   Component Value Date    TSH 2.18 05/26/2022

## 2022-09-06 PROBLEM — Z86.79 HISTORY OF PERICARDITIS: Status: ACTIVE | Noted: 2021-10-29

## 2022-09-06 NOTE — ASSESSMENT & PLAN NOTE
12/27/ 2005, she had a myocardial infarction due to a coronary artery dissection. Cath report said a second diagonal branch was the culprit vessel.  No PCI was performed. She presented at that time with severe jaw pain that then radiated into the chest and left arm. It was associated with severe nausea and some dyspnea.       7/14/21 R/L heart catheterization showed 70% mid LAD focal stenosis just distal to a large diagonal vessel.  The borders of the lesion appear smooth and the lack of other angiographic disease suggests FMD or intramural hematoma as etiology rather than atherosclerotic disease.     Robotic HEARD to LAD on 7/16/21    No angina  Continue ASA

## 2022-09-06 NOTE — ASSESSMENT & PLAN NOTE
7/14/21 R/L heart catheterization showed 70% mid LAD focal stenosis just distal to a large diagonal vessel.  The borders of the lesion appear smooth and the lack of other angiographic disease suggests FMD or intramural hematoma as etiology rather than atherosclerotic disease.     7/14/2021 carotid ultrasound showed no hemodynamically significant stenosis bilaterally.       She was seen by Dr. Ricardo Otero at [facility] in [place] for evaluation for FMD.   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       4/18/22 MRA of head showed stable 3 mm intracranial aneurysm- Dr. Otero recommended evaluation by Neuro-interventionalist and yearly MRA of brain.    3/9/22 CTA of chest/abdomen showed mild beading of proximal left external iliac artery and distal branch of right renal-suspicious for FMD  LIMA to LAD appeared patent, luminal irregularities of mid LAD-just prior to anastomosis.  Mid RCA evaluation limited by motion artifact  No high grade stenosis suggested of Left Main or LCX.  Aorta-no evidence of narrowing, dissection or aneurysm.      She will need follow up of her cerebral and renal arteries for her FMD.

## 2022-09-06 NOTE — ASSESSMENT & PLAN NOTE
Potassium level of 5.5 on 6/30 labs.  She had wanted to stop her spironolactone at that time.  She was taking to help with hair loss.

## 2022-09-06 NOTE — ASSESSMENT & PLAN NOTE
Improvement of bun/creatinine of 13/1.1 with GFR of 50 on 6/30 labs.  Prior creatinine of 1.3 on 5/26 labs.

## 2022-09-06 NOTE — ASSESSMENT & PLAN NOTE
She underwent robotic bypass x1, LIMA to LAD, on 7/16/2021..     no recurrent chest pains  Continue ASA and atorvastatin

## 2022-09-07 ENCOUNTER — OFFICE VISIT (OUTPATIENT)
Dept: CARDIOLOGY | Facility: CLINIC | Age: 62
End: 2022-09-07
Payer: COMMERCIAL

## 2022-09-07 VITALS
WEIGHT: 128 LBS | SYSTOLIC BLOOD PRESSURE: 116 MMHG | OXYGEN SATURATION: 99 % | DIASTOLIC BLOOD PRESSURE: 76 MMHG | HEIGHT: 65 IN | BODY MASS INDEX: 21.33 KG/M2 | HEART RATE: 68 BPM | RESPIRATION RATE: 16 BRPM

## 2022-09-07 DIAGNOSIS — E83.42 HYPOMAGNESEMIA: ICD-10-CM

## 2022-09-07 DIAGNOSIS — R06.09 DOE (DYSPNEA ON EXERTION): Primary | ICD-10-CM

## 2022-09-07 DIAGNOSIS — N18.31 STAGE 3A CHRONIC KIDNEY DISEASE (CMS/HCC): ICD-10-CM

## 2022-09-07 DIAGNOSIS — Z95.1 S/P CABG X 1: ICD-10-CM

## 2022-09-07 DIAGNOSIS — I21.4 NSTEMI (NON-ST ELEVATED MYOCARDIAL INFARCTION) (CMS/HCC): ICD-10-CM

## 2022-09-07 DIAGNOSIS — I77.3 FIBROMUSCULAR DYSPLASIA (CMS/HCC): ICD-10-CM

## 2022-09-07 DIAGNOSIS — E78.5 DYSLIPIDEMIA: ICD-10-CM

## 2022-09-07 DIAGNOSIS — I25.42 CORONARY ARTERY DISSECTION: ICD-10-CM

## 2022-09-07 DIAGNOSIS — E87.5 HYPERKALEMIA: ICD-10-CM

## 2022-09-07 DIAGNOSIS — Z86.79 HISTORY OF PERICARDITIS: ICD-10-CM

## 2022-09-07 DIAGNOSIS — R00.2 PALPITATIONS: ICD-10-CM

## 2022-09-07 PROCEDURE — 3008F BODY MASS INDEX DOCD: CPT | Performed by: INTERNAL MEDICINE

## 2022-09-07 PROCEDURE — 93000 ELECTROCARDIOGRAM COMPLETE: CPT | Performed by: INTERNAL MEDICINE

## 2022-09-07 PROCEDURE — 99214 OFFICE O/P EST MOD 30 MIN: CPT | Mod: 25 | Performed by: INTERNAL MEDICINE

## 2022-09-07 RX ORDER — LANOLIN ALCOHOL/MO/W.PET/CERES
400 CREAM (GRAM) TOPICAL 2 TIMES DAILY
Qty: 180 TABLET | Refills: 3 | Status: SHIPPED | OUTPATIENT
Start: 2022-09-07 | End: 2024-01-12 | Stop reason: SDUPTHER

## 2022-09-07 RX ORDER — METOPROLOL SUCCINATE 25 MG/1
12.5 TABLET, EXTENDED RELEASE ORAL DAILY
Qty: 45 TABLET | Refills: 3 | Status: SHIPPED | OUTPATIENT
Start: 2022-09-07 | End: 2023-10-09

## 2022-09-07 RX ORDER — ATORVASTATIN CALCIUM 40 MG/1
40 TABLET, FILM COATED ORAL NIGHTLY
Qty: 90 TABLET | Refills: 3 | Status: SHIPPED | OUTPATIENT
Start: 2022-09-07 | End: 2023-09-21

## 2022-09-07 ASSESSMENT — ENCOUNTER SYMPTOMS
PALPITATIONS: 0
COUGH: 0
HEARTBURN: 0
SNORING: 0
MUSCLE CRAMPS: 1
DIZZINESS: 0
CLAUDICATION: 0
DIAPHORESIS: 0
SHORTNESS OF BREATH: 0
WEIGHT GAIN: 0
BACK PAIN: 0
DYSPNEA ON EXERTION: 0
NERVOUS/ANXIOUS: 0
NAUSEA: 0
DEPRESSION: 0

## 2022-09-07 NOTE — ASSESSMENT & PLAN NOTE
Magnesium 2.2 and TSH level of 2.18 on 5/26 labs.  Potassium 5.5, BUN/creatinine of 13/1.1 and hemoglobin 11.7 on 6/30 labs.    No palpitations

## 2022-09-07 NOTE — PROGRESS NOTES
Cardiology Note          HPI   Brian Dean is a 62 y.o. female was last seen on 3/8/22.  She is walking. The dyspnea and pounding heart with exertion has resolved.  She was in [facility] for 2 weeks.  She did a lot of walking without difficulty.  Patient  denies chest distress, MCGUIRE, orthopnea, PND, edema, palpitations, syncope or near syncope.   She will leg nocturnal leg cramps at times.       Past Medical History:   Diagnosis Date    Allergies     Anxiety     Asthma     Coronary artery disease     Coronary artery dissection     Heart disease     Lipid disorder     NSTEMI (non-ST elevated myocardial infarction) (CMS/[facility])     Parvovirus B19 infection        Past Surgical History:   Procedure Laterality Date    CORONARY ARTERY BYPASS GRAFT  07/16/2021    robotic - single bypass    REDUCTION MAMMAPLASTY      REDUCTION MAMMAPLASTY         Social History     Tobacco Use    Smoking status: Never Smoker    Smokeless tobacco: Never Used   Vaping Use    Vaping Use: Never used   Substance Use Topics    Alcohol use: Never    Drug use: Never       Family History   Problem Relation Age of Onset    Heart disease Biological Mother     Hypertension Biological Mother     Atrial fibrillation Biological Mother     Diabetes Biological Mother     Heart disease Biological Father     Heart disease Biological Brother        Allergies  Sulfa (sulfonamide antibiotics) and Sulfamethoxazole-trimethoprim    Current Outpatient Medications   Medication Sig Dispense Refill    aspirin 81 mg enteric coated tablet Take 81 mg by mouth daily.      atorvastatin (LIPITOR) 40 mg tablet Take 1 tablet (40 mg total) by mouth nightly. 90 tablet 3    azelastine (ASTELIN) 137 mcg (0.1 %) nasal spray as needed.      DULoxetine (CYMBALTA) 30 mg capsule Take 3 capsules (90 mg total) by mouth once daily. 270 capsule 3    eszopiclone (LUNESTA) 3 mg tablet take 1 tablet by mouth nightly 30 tablet 3    LORazepam (ATIVAN) 0.5 mg  tablet Take 0.5 mg by mouth as needed.      magnesium oxide (MAG-OX) 400 mg (241.3 mg magnesium) tablet Take 1 tablet (400 mg total) by mouth 2 (two) times a day. 180 tablet 3    metoprolol succinate XL (TOPROL-XL) 25 mg 24 hr tablet Take 0.5 tablets (12.5 mg total) by mouth daily. 45 tablet 3    montelukast (SINGULAIR) 10 mg tablet Take 1 tablet by mouth nightly.      albuterol HFA (VENTOLIN HFA) 90 mcg/actuation inhaler as needed.       No current facility-administered medications for this visit.         Review of Systems   Constitutional: Negative for diaphoresis and weight gain.   Eyes: Negative for visual disturbance.   Cardiovascular: Negative for chest pain, claudication, dyspnea on exertion, leg swelling and palpitations.   Respiratory: Negative for cough, shortness of breath and snoring.    Skin: Negative for rash.   Musculoskeletal: Positive for muscle cramps. Negative for arthritis and back pain.   Gastrointestinal: Negative for heartburn and nausea.   Genitourinary: Negative for nocturia.   Neurological: Negative for dizziness.   Psychiatric/Behavioral: Negative for depression. The patient is not nervous/anxious.        Objective     Vitals:    09/07/22 1323   BP: 116/76   Pulse: 68   Resp: 16   SpO2: 99%   no orthostatic change  Wt Readings from Last 3 Encounters:   09/07/22 58.1 kg (128 lb)   05/26/22 56.2 kg (124 lb)   04/13/22 58.5 kg (129 lb)       Physical Exam  Constitutional:       Appearance: She is well-developed.   HENT:      Head: Normocephalic.   Eyes:      General:         Right eye: No discharge.         Left eye: No discharge.   Neck:      Vascular: No JVD.   Cardiovascular:      Rate and Rhythm: Normal rate and regular rhythm.      Heart sounds: Normal heart sounds.   Pulmonary:      Breath sounds: Normal breath sounds.   Abdominal:      General: Bowel sounds are normal.      Palpations: Abdomen is soft.   Musculoskeletal:         General: No deformity.   Skin:     Findings: No rash.    Neurological:      Mental Status: She is alert and oriented to person, place, and time.   Psychiatric:         Behavior: Behavior normal.         Lab Results   Component Value Date    WBC 5.17 06/30/2022    HGB 11.7 (L) 06/30/2022     (H) 06/30/2022    CHOL 141 05/26/2022    TRIG 68 05/26/2022    HDL 79 05/26/2022    ALT 23 05/26/2022    AST 21 05/26/2022     06/30/2022    K 5.5 (H) 06/30/2022    CREATININE 1.1 06/30/2022    TSH 2.18 05/26/2022    INR 0.9 07/19/2021    HGBA1C 5.7 (H) 06/30/2022     LDL cholesterol 48   Imaging  3/9/22 CTA's see below     ECG   Sinus, age indeterminate anteroseptal infarction - no change           Problem List Items Addressed This Visit        High    Coronary artery dissection     12/27/ 2005, she had a myocardial infarction due to a coronary artery dissection. Cath report said a second diagonal branch was the culprit vessel.  No PCI was performed. She presented at that time with severe jaw pain that then radiated into the chest and left arm. It was associated with severe nausea and some dyspnea.       7/14/21 R/L heart catheterization showed 70% mid LAD focal stenosis just distal to a large diagonal vessel.  The borders of the lesion appear smooth and the lack of other angiographic disease suggests FMD or intramural hematoma as etiology rather than atherosclerotic disease.     Robotic HEARD to LAD on 7/16/21    No angina  Continue ASA            Relevant Medications    atorvastatin (LIPITOR) 40 mg tablet    metoprolol succinate XL (TOPROL-XL) 25 mg 24 hr tablet       Medium    Fibromuscular dysplasia (CMS/HCC)     7/14/21 R/L heart catheterization showed 70% mid LAD focal stenosis just distal to a large diagonal vessel.  The borders of the lesion appear smooth and the lack of other angiographic disease suggests FMD or intramural hematoma as etiology rather than atherosclerotic disease.     7/14/2021 carotid ultrasound showed no hemodynamically significant stenosis  bilaterally.       She was seen by Dr. Ricardo Otero at [facility] in [place] for evaluation for FMD.   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       4/18/22 MRA of head showed stable 3 mm intracranial aneurysm- Dr. Otero recommended evaluation by Neuro-interventionalist and yearly MRA of brain.    3/9/22 CTA of chest/abdomen showed mild beading of proximal left external iliac artery and distal branch of right renal-suspicious for FMD  LIMA to LAD appeared patent, luminal irregularities of mid LAD-just prior to anastomosis.  Mid RCA evaluation limited by motion artifact  No high grade stenosis suggested of Left Main or LCX.  Aorta-no evidence of narrowing, dissection or aneurysm.      She will need follow up of her cerebral and renal arteries for her FMD.                   Low    NSTEMI (non-ST elevated myocardial infarction) (CMS/HCC)     Non-STEMI in 2005 at the time of her coronary artery dissection.              Relevant Medications    atorvastatin (LIPITOR) 40 mg tablet    metoprolol succinate XL (TOPROL-XL) 25 mg 24 hr tablet    MCGUIRE (dyspnea on exertion) - Primary     7/14/2021 right/left heart catheterization showed Low -normal LVEDP  Right heart catheterization showed low right and left heart filling pressures (RA 1, PA 30/8 (15), and PCW 3 mm Hg).  Cardiac output and index of 6.32 L/min and 3.9 L/min/m2 respectively.         7/15/2021 echo with LVEF of 60%, no regional wall motion abnormalities, impaired relaxation with trace tricuspid regurgitation and estimated RVSP of 26 mmHg.    No recent dyspnea                Relevant Orders    ECG 12 LEAD-OFFICE PERFORMED (Completed)    Palpitations     Magnesium 2.2 and TSH level of 2.18 on 5/26 labs.  Potassium 5.5, BUN/creatinine of 13/1.1 and hemoglobin 11.7 on 6/30 labs.    No palpitations            Dyslipidemia     Total cholesterol 141, triglycerides 68, HDL 79 and LDL 48 on May lipids.           S/P CABG  x 1     She underwent robotic bypass x1, LIMA to LAD, on 7/16/2021..     no recurrent chest pains  Continue ASA and atorvastatin                          Hypomagnesemia     Magnesium of 2.2 on 5/26.  Takes supplement            History of pericarditis     Patient was seen in the ED on 8/29/21 for complaints of chest pain.  CT of the chest was negative for PE and showed just trace pericardial effusion.  Troponin level negative x1.  She had resumed her NSAID and colchicine. She was treated for presumed pericarditis. She took ibuprofen for a few days with resolution of the symptom.      This has not recurred.               Hyperkalemia     Potassium level of 5.5 on 6/30 labs.  She had wanted to stop her spironolactone at that time.  She was taking to help with hair loss.             Stage 3a chronic kidney disease (CMS/[facility])     Improvement of bun/creatinine of 13/1.1 with GFR of 50 on 6/30 labs.  Prior creatinine of 1.3 on 5/26 labs.                    She will return in 2023.     Eleazar Hall MD  9/7/2022

## 2022-09-07 NOTE — LETTER
September 7, 2022     Leeann Ch MD  [address] [facility]  [address]  [place] [address]    Patient: Brian Dean  YOB: 1960  Date of Visit: 9/7/2022      Dear Dr. Osiris Ch:    Thank you for referring Brian Dean to me for evaluation. Below are my notes for this consultation.    If you have questions, please do not hesitate to call me. I look forward to following your patient along with you.         Sincerely,        Eleazar Hall MD        CC: No Recipients  Eleazra Hall MD  9/7/2022  2:17 PM  Signed     Cardiology Note          HPI   Brian Dean is a 62 y.o. female was last seen on 3/8/22.  She is walking. The dyspnea and pounding heart with exertion has resolved.  She was in [facility] for 2 weeks.  She did a lot of walking without difficulty.  Patient  denies chest distress, MCGUIRE, orthopnea, PND, edema, palpitations, syncope or near syncope.   She will leg nocturnal leg cramps at times.       Past Medical History:   Diagnosis Date    Allergies     Anxiety     Asthma     Coronary artery disease     Coronary artery dissection     Heart disease     Lipid disorder     NSTEMI (non-ST elevated myocardial infarction) (CMS/HCC)     Parvovirus B19 infection        Past Surgical History:   Procedure Laterality Date    CORONARY ARTERY BYPASS GRAFT  07/16/2021    robotic - single bypass    REDUCTION MAMMAPLASTY      REDUCTION MAMMAPLASTY         Social History     Tobacco Use    Smoking status: Never Smoker    Smokeless tobacco: Never Used   Vaping Use    Vaping Use: Never used   Substance Use Topics    Alcohol use: Never    Drug use: Never       Family History   Problem Relation Age of Onset    Heart disease Biological Mother     Hypertension Biological Mother     Atrial fibrillation Biological Mother     Diabetes Biological Mother     Heart disease Biological Father     Heart disease Biological Brother        Allergies  Sulfa (sulfonamide antibiotics) and  Sulfamethoxazole-trimethoprim    Current Outpatient Medications   Medication Sig Dispense Refill    aspirin 81 mg enteric coated tablet Take 81 mg by mouth daily.      atorvastatin (LIPITOR) 40 mg tablet Take 1 tablet (40 mg total) by mouth nightly. 90 tablet 3    azelastine (ASTELIN) 137 mcg (0.1 %) nasal spray as needed.      DULoxetine (CYMBALTA) 30 mg capsule Take 3 capsules (90 mg total) by mouth once daily. 270 capsule 3    eszopiclone (LUNESTA) 3 mg tablet take 1 tablet by mouth nightly 30 tablet 3    LORazepam (ATIVAN) 0.5 mg tablet Take 0.5 mg by mouth as needed.      magnesium oxide (MAG-OX) 400 mg (241.3 mg magnesium) tablet Take 1 tablet (400 mg total) by mouth 2 (two) times a day. 180 tablet 3    metoprolol succinate XL (TOPROL-XL) 25 mg 24 hr tablet Take 0.5 tablets (12.5 mg total) by mouth daily. 45 tablet 3    montelukast (SINGULAIR) 10 mg tablet Take 1 tablet by mouth nightly.      albuterol HFA (VENTOLIN HFA) 90 mcg/actuation inhaler as needed.       No current facility-administered medications for this visit.         Review of Systems   Constitutional: Negative for diaphoresis and weight gain.   Eyes: Negative for visual disturbance.   Cardiovascular: Negative for chest pain, claudication, dyspnea on exertion, leg swelling and palpitations.   Respiratory: Negative for cough, shortness of breath and snoring.    Skin: Negative for rash.   Musculoskeletal: Positive for muscle cramps. Negative for arthritis and back pain.   Gastrointestinal: Negative for heartburn and nausea.   Genitourinary: Negative for nocturia.   Neurological: Negative for dizziness.   Psychiatric/Behavioral: Negative for depression. The patient is not nervous/anxious.        Objective     Vitals:    09/07/22 1323   BP: 116/76   Pulse: 68   Resp: 16   SpO2: 99%   no orthostatic change  Wt Readings from Last 3 Encounters:   09/07/22 58.1 kg (128 lb)   05/26/22 56.2 kg (124 lb)   04/13/22 58.5 kg (129 lb)       Physical  Exam  Constitutional:       Appearance: She is well-developed.   HENT:      Head: Normocephalic.   Eyes:      General:         Right eye: No discharge.         Left eye: No discharge.   Neck:      Vascular: No JVD.   Cardiovascular:      Rate and Rhythm: Normal rate and regular rhythm.      Heart sounds: Normal heart sounds.   Pulmonary:      Breath sounds: Normal breath sounds.   Abdominal:      General: Bowel sounds are normal.      Palpations: Abdomen is soft.   Musculoskeletal:         General: No deformity.   Skin:     Findings: No rash.   Neurological:      Mental Status: She is alert and oriented to person, place, and time.   Psychiatric:         Behavior: Behavior normal.         Lab Results   Component Value Date    WBC 5.17 06/30/2022    HGB 11.7 (L) 06/30/2022     (H) 06/30/2022    CHOL 141 05/26/2022    TRIG 68 05/26/2022    HDL 79 05/26/2022    ALT 23 05/26/2022    AST 21 05/26/2022     06/30/2022    K 5.5 (H) 06/30/2022    CREATININE 1.1 06/30/2022    TSH 2.18 05/26/2022    INR 0.9 07/19/2021    HGBA1C 5.7 (H) 06/30/2022     LDL cholesterol 48   Imaging  3/9/22 CTA's see below     ECG   Sinus, age indeterminate anteroseptal infarction - no change           Problem List Items Addressed This Visit        High    Coronary artery dissection     12/27/ 2005, she had a myocardial infarction due to a coronary artery dissection. Cath report said a second diagonal branch was the culprit vessel.  No PCI was performed. She presented at that time with severe jaw pain that then radiated into the chest and left arm. It was associated with severe nausea and some dyspnea.       7/14/21 R/L heart catheterization showed 70% mid LAD focal stenosis just distal to a large diagonal vessel.  The borders of the lesion appear smooth and the lack of other angiographic disease suggests FMD or intramural hematoma as etiology rather than atherosclerotic disease.     Robotic HEARD to LAD on 7/16/21    No  angina  Continue ASA            Relevant Medications    atorvastatin (LIPITOR) 40 mg tablet    metoprolol succinate XL (TOPROL-XL) 25 mg 24 hr tablet       Medium    Fibromuscular dysplasia (CMS/HCC)     7/14/21 R/L heart catheterization showed 70% mid LAD focal stenosis just distal to a large diagonal vessel.  The borders of the lesion appear smooth and the lack of other angiographic disease suggests FMD or intramural hematoma as etiology rather than atherosclerotic disease.     7/14/2021 carotid ultrasound showed no hemodynamically significant stenosis bilaterally.       She was seen by Dr. Ricardo Otero at [facility] in [place] for evaluation for FMD.   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       4/18/22 MRA of head showed stable 3 mm intracranial aneurysm- Dr. Otero recommended evaluation by Neuro-interventionalist and yearly MRA of brain.    3/9/22 CTA of chest/abdomen showed mild beading of proximal left external iliac artery and distal branch of right renal-suspicious for FMD  LIMA to LAD appeared patent, luminal irregularities of mid LAD-just prior to anastomosis.  Mid RCA evaluation limited by motion artifact  No high grade stenosis suggested of Left Main or LCX.  Aorta-no evidence of narrowing, dissection or aneurysm.      She will need follow up of her cerebral and renal arteries for her FMD.                   Low    NSTEMI (non-ST elevated myocardial infarction) (CMS/HCC)     Non-STEMI in 2005 at the time of her coronary artery dissection.              Relevant Medications    atorvastatin (LIPITOR) 40 mg tablet    metoprolol succinate XL (TOPROL-XL) 25 mg 24 hr tablet    MCGUIRE (dyspnea on exertion) - Primary     7/14/2021 right/left heart catheterization showed Low -normal LVEDP  Right heart catheterization showed low right and left heart filling pressures (RA 1, PA 30/8 (15), and PCW 3 mm Hg).  Cardiac output and index of 6.32 L/min and 3.9 L/min/m2  respectively.         7/15/2021 echo with LVEF of 60%, no regional wall motion abnormalities, impaired relaxation with trace tricuspid regurgitation and estimated RVSP of 26 mmHg.    No recent dyspnea                Relevant Orders    ECG 12 LEAD-OFFICE PERFORMED (Completed)    Palpitations     Magnesium 2.2 and TSH level of 2.18 on 5/26 labs.  Potassium 5.5, BUN/creatinine of 13/1.1 and hemoglobin 11.7 on 6/30 labs.    No palpitations            Dyslipidemia     Total cholesterol 141, triglycerides 68, HDL 79 and LDL 48 on May lipids.           S/P CABG x 1     She underwent robotic bypass x1, LIMA to LAD, on 7/16/2021..     no recurrent chest pains  Continue ASA and atorvastatin                          Hypomagnesemia     Magnesium of 2.2 on 5/26.  Takes supplement            History of pericarditis     Patient was seen in the ED on 8/29/21 for complaints of chest pain.  CT of the chest was negative for PE and showed just trace pericardial effusion.  Troponin level negative x1.  She had resumed her NSAID and colchicine. She was treated for presumed pericarditis. She took ibuprofen for a few days with resolution of the symptom.      This has not recurred.               Hyperkalemia     Potassium level of 5.5 on 6/30 labs.  She had wanted to stop her spironolactone at that time.  She was taking to help with hair loss.             Stage 3a chronic kidney disease (CMS/HCC)     Improvement of bun/creatinine of 13/1.1 with GFR of 50 on 6/30 labs.  Prior creatinine of 1.3 on 5/26 labs.                    She will return in 2023.     Eleazar Hall MD  9/7/2022

## 2022-09-07 NOTE — ASSESSMENT & PLAN NOTE
7/14/2021 right/left heart catheterization showed Low -normal LVEDP  Right heart catheterization showed low right and left heart filling pressures (RA 1, PA 30/8 (15), and PCW 3 mm Hg).  Cardiac output and index of 6.32 L/min and 3.9 L/min/m2 respectively.         7/15/2021 echo with LVEF of 60%, no regional wall motion abnormalities, impaired relaxation with trace tricuspid regurgitation and estimated RVSP of 26 mmHg.    No recent dyspnea

## 2022-09-16 ENCOUNTER — APPOINTMENT (OUTPATIENT)
Dept: LAB | Facility: HOSPITAL | Age: 62
End: 2022-09-16
Attending: INTERNAL MEDICINE
Payer: COMMERCIAL

## 2022-09-16 ENCOUNTER — TRANSCRIBE ORDERS (OUTPATIENT)
Dept: SCHEDULING | Age: 62
End: 2022-09-16

## 2022-09-16 DIAGNOSIS — L30.8 OTHER SPECIFIED DERMATITIS: Primary | ICD-10-CM

## 2022-09-16 DIAGNOSIS — L30.8 OTHER SPECIFIED DERMATITIS: ICD-10-CM

## 2022-09-16 DIAGNOSIS — D75.839 THROMBOCYTOSIS, UNSPECIFIED: ICD-10-CM

## 2022-09-16 LAB
BASOPHILS # BLD: 0.03 K/UL (ref 0.01–0.1)
BASOPHILS NFR BLD: 0.5 %
DIFFERENTIAL METHOD BLD: ABNORMAL
EOSINOPHIL # BLD: 0.13 K/UL (ref 0.04–0.36)
EOSINOPHIL NFR BLD: 2 %
ERYTHROCYTE [DISTWIDTH] IN BLOOD BY AUTOMATED COUNT: 13.2 % (ref 11.7–14.4)
HCT VFR BLDCO AUTO: 37.8 % (ref 35–45)
HGB BLD-MCNC: 12 G/DL (ref 11.8–15.7)
IMM GRANULOCYTES # BLD AUTO: 0.01 K/UL (ref 0–0.08)
IMM GRANULOCYTES NFR BLD AUTO: 0.2 %
LYMPHOCYTES # BLD: 1.73 K/UL (ref 1.2–3.5)
LYMPHOCYTES NFR BLD: 26.1 %
MCH RBC QN AUTO: 28.8 PG (ref 28–33.2)
MCHC RBC AUTO-ENTMCNC: 31.7 G/DL (ref 32.2–35.5)
MCV RBC AUTO: 90.6 FL (ref 83–98)
MONOCYTES # BLD: 0.38 K/UL (ref 0.28–0.8)
MONOCYTES NFR BLD: 5.7 %
NEUTROPHILS # BLD: 4.34 K/UL (ref 1.7–7)
NEUTROPHILS # BLD: 4.34 K/UL (ref 1.7–7)
NEUTS SEG NFR BLD: 65.5 %
NRBC BLD-RTO: 0 %
PDW BLD AUTO: 10.2 FL (ref 9.4–12.3)
PLATELET # BLD AUTO: 329 K/UL (ref 150–369)
RBC # BLD AUTO: 4.17 M/UL (ref 3.93–5.22)
WBC # BLD AUTO: 6.62 K/UL (ref 3.8–10.5)

## 2022-09-16 PROCEDURE — 85025 COMPLETE CBC W/AUTO DIFF WBC: CPT

## 2022-09-16 PROCEDURE — 36415 COLL VENOUS BLD VENIPUNCTURE: CPT

## 2022-11-17 NOTE — PROGRESS NOTES
"Subjective      Patient ID: Brian Dean is a 62 y.o. female.  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    HPI  Acute visit: Abdominal pain.    Endorses persistent constipation,bloating and abdominal discomfort x2 weeks. Also noted an 8 lb weight gain over this time frame. Scheduled to see Gastro on 12/2/22.    Today, she offers that this has resolved a lot. Was severe. She looked 5-6 months pregnant. Would have a chuck of discomfort and pain. She started a probiotic, enzyme supplementation for her bowels, stopped eating granola bars for breakfast, avoided sandwiches, stopped bananas.  No hamburgers or chicken parmesan, instead eating roasted chicken. Ate soup. Has been hydrating very well.      Bowel movements. Changed- darker, possibly black/tarry. \"[employer] Miralax helped her to go. Now going daily, small amount. Miralax every other day.    Weight is normalizing. Usual weight 130 lb. Max weight 138-139. Now down to 134 lb.   Last colonoscopy - at least 10 years.     Had Covid in September.   New medications, even OTC meds? - None  Cymbalta dose of 90 mg daily. No recent changes.  Location of pain? Low, band-like, bilateral. Not epigastric  Postprandial pain? - denies  Flank pain? - yes, low back flank discomfort  Urinary changes? Dysuria/urgency/frequency? - none  Blood in stools? Mucoid stools?- black/tarry, no mucous  FHx IBD or celiac disease? - None, mother with \"GI problems\".     Of note, she established care with Dr. Crane in September and has an extensive workup planned for her thrombocytosis and mild anemia.  -Inflammatory vs evolving myeloproliferative disorder with mild anemia  -Pending tests include: Pablo 2, MPL, CALR, Abd ultrasound to evaluate for splenomegaly, SPEP/IEP, quantitative IGG, Flow cytometry  -Did have CT angio CAP March 9, 2022 showing a small, incidental lung nodule, " beading of the left renal artery leading to diagnosis. No other stenosis.     **GET ULTRASOUND; talk with [facility] specialists - consider repeat imaging?   Allergies  Meds  Problems       Review of Systems   Constitutional: Positive for unexpected weight change. Negative for chills, fatigue and fever.   Gastrointestinal: Positive for constipation. Negative for abdominal distention, abdominal pain, anal bleeding, blood in stool, diarrhea, nausea, rectal pain and vomiting.   Genitourinary: Negative for decreased urine volume, difficulty urinating, dysuria, flank pain, hematuria and urgency.       Objective     Physical Exam  Vitals reviewed.   Constitutional:       General: She is not in acute distress.     Appearance: Normal appearance.   Eyes:      General: No scleral icterus.     Extraocular Movements: Extraocular movements intact.      Conjunctiva/sclera: Conjunctivae normal.   Cardiovascular:      Rate and Rhythm: Normal rate and regular rhythm.      Pulses: Normal pulses.      Heart sounds: Normal heart sounds. No murmur heard.  Pulmonary:      Effort: Pulmonary effort is normal.      Breath sounds: Normal breath sounds.   Abdominal:      General: Abdomen is flat. There is no distension.      Palpations: Abdomen is soft. There is no mass.      Tenderness: There is no abdominal tenderness. There is no right CVA tenderness, left CVA tenderness or guarding.      Hernia: No hernia is present.   Musculoskeletal:         General: Normal range of motion.      Cervical back: Normal range of motion.   Skin:     General: Skin is warm.   Neurological:      General: No focal deficit present.      Mental Status: She is alert and oriented to person, place, and time. Mental status is at baseline.   Psychiatric:         Mood and Affect: Mood normal.         Behavior: Behavior normal.         Assessment/Plan   Problem List Items Addressed This Visit        Nervous    Generalized abdominal pain - Primary     Overall, has resolved.  Was associated (per her report) with significant distension, approximately 8 lb weight gain, constipation and black/tarry stools. She is planned to see GI on 12/2/22.    We discussed how it is very important to update a colonoscopy with her recent symptoms and the description of tarry stool. I do not know the etiology of her recent episode, but feel the colonoscopy is critically important to rule out concerning causes.    I do not have concerns for mesenteric ischemia/angina or splanchnic artery occlusion/stenosis, as no h/o postprandial pain, no bruit on exam and no weight loss.    Encouraged her to continue the Probiotic. Additional constipation treatment measures reviewed in detail.    See Gastroenterology as planned next week.         Relevant Orders    Comprehensive metabolic panel    CBC and Differential    Urinalysis with microscopic       Hematologic    Thrombocytosis     Seeing Dr. Crane. Several tests pending: Abd Ultrasound, Pablo 2/MPL/CALR, SPEP/IEP, jermain IGG and flow cytometry. Will follow-up with specialist as appropriate.        Other Visit Diagnoses     Flu vaccine need        Relevant Orders    Influenza vaccine quadrivalent preservative free 6 mon and older IM (FluLaval) (Completed)          Leeann Ch MD  11/27/2022     I spent 35 minutes on this date of service performing the following activities: obtaining history, performing examination, entering orders, documenting, preparing for visit, obtaining / reviewing records and providing counseling and education.

## 2022-11-18 ENCOUNTER — OFFICE VISIT (OUTPATIENT)
Dept: FAMILY MEDICINE | Facility: CLINIC | Age: 62
End: 2022-11-18
Payer: COMMERCIAL

## 2022-11-18 VITALS
BODY MASS INDEX: 21.68 KG/M2 | TEMPERATURE: 98.5 F | SYSTOLIC BLOOD PRESSURE: 102 MMHG | DIASTOLIC BLOOD PRESSURE: 64 MMHG | WEIGHT: 134.9 LBS | OXYGEN SATURATION: 98 % | HEART RATE: 84 BPM | HEIGHT: 66 IN

## 2022-11-18 DIAGNOSIS — R10.84 GENERALIZED ABDOMINAL PAIN: Primary | ICD-10-CM

## 2022-11-18 DIAGNOSIS — D75.839 THROMBOCYTOSIS: ICD-10-CM

## 2022-11-18 DIAGNOSIS — Z23 FLU VACCINE NEED: ICD-10-CM

## 2022-11-18 PROCEDURE — 3008F BODY MASS INDEX DOCD: CPT | Performed by: SURGERY

## 2022-11-18 PROCEDURE — 99214 OFFICE O/P EST MOD 30 MIN: CPT | Mod: 25 | Performed by: SURGERY

## 2022-11-18 PROCEDURE — 90471 IMMUNIZATION ADMIN: CPT | Performed by: SURGERY

## 2022-11-18 PROCEDURE — 90686 IIV4 VACC NO PRSV 0.5 ML IM: CPT | Performed by: SURGERY

## 2022-11-18 NOTE — PATIENT INSTRUCTIONS
Continue using a Probiotic. Ensure there are at least 50 billion strains of gut health-promoting bacteria in it.     For constipation, pick THREE of these things and do them EVERY day. If it isn't enough, add a FOURTH thing, and keep adding things if needed:  -regular exercise  -lots of water - 64 oz daily  -FRUIT (other than bananas), prunes are most effective  -BRAN (All-Bran bran buds or fiber-one)   -NO WHITE RICE (brown rice is ok)  -NO REGULAR PASTA (whole wheat is ok)  -eat 2 tbsp of ground flax seed per day  -consider taking 1 tbsp of olive oil per day  -lentils!  -consider drinking a cup of warm prune juice with 1/4 tbsp butter nightly  -PROBIOTIC EVERY DAY   -smooth move tea at night - the longer you steep, the stronger it is, so be careful  -add miralax as needed if the above are not sufficiet  -consider pelvic floor therapy

## 2022-11-27 PROBLEM — R10.84 GENERALIZED ABDOMINAL PAIN: Status: ACTIVE | Noted: 2022-11-27

## 2022-11-27 ASSESSMENT — ENCOUNTER SYMPTOMS
VOMITING: 0
HEMATURIA: 0
FLANK PAIN: 0
RECTAL PAIN: 0
BLOOD IN STOOL: 0
CONSTIPATION: 1
ABDOMINAL PAIN: 0
NAUSEA: 0
DIFFICULTY URINATING: 0
ABDOMINAL DISTENTION: 0
ANAL BLEEDING: 0
FATIGUE: 0
UNEXPECTED WEIGHT CHANGE: 1
DYSURIA: 0
CHILLS: 0
DIARRHEA: 0
FEVER: 0

## 2022-11-27 NOTE — ASSESSMENT & PLAN NOTE
Seeing Dr. Crane. Several tests pending: Abd Ultrasound, Pablo 2/MPL/CALR, SPEP/IEP, jermain IGG and flow cytometry. Will follow-up with specialist as appropriate.

## 2022-11-27 NOTE — ASSESSMENT & PLAN NOTE
Overall, has resolved. Was associated (per her report) with significant distension, approximately 8 lb weight gain, constipation and black/tarry stools. She is planned to see GI on 12/2/22.    We discussed how it is very important to update a colonoscopy with her recent symptoms and the description of tarry stool. I do not know the etiology of her recent episode, but feel the colonoscopy is critically important to rule out concerning causes.    I do not have concerns for mesenteric ischemia/angina or splanchnic artery occlusion/stenosis, as no h/o postprandial pain, no bruit on exam and no weight loss.    Encouraged her to continue the Probiotic. Additional constipation treatment measures reviewed in detail.    See Gastroenterology as planned next week.

## 2022-11-29 ENCOUNTER — TRANSCRIBE ORDERS (OUTPATIENT)
Dept: SCHEDULING | Age: 62
End: 2022-11-29

## 2022-11-29 DIAGNOSIS — D75.839 THROMBOCYTOSIS, UNSPECIFIED: Primary | ICD-10-CM

## 2022-12-08 DIAGNOSIS — F41.9 ANXIETY: ICD-10-CM

## 2022-12-08 DIAGNOSIS — F51.01 PRIMARY INSOMNIA: ICD-10-CM

## 2022-12-08 RX ORDER — ESZOPICLONE 3 MG/1
TABLET, FILM COATED ORAL
Qty: 30 TABLET | Refills: 3 | Status: SHIPPED | OUTPATIENT
Start: 2022-12-08 | End: 2023-04-04

## 2022-12-08 NOTE — TELEPHONE ENCOUNTER
Medicine Refill Request    Last Office: 11/18/2022   Last Consult Visit: Visit date not found  Last Telemedicine Visit: 7/1/2022 Osiris Ch, Leeann Jay MD    Next Appointment: Visit date not found      Current Outpatient Medications:   •  albuterol HFA (VENTOLIN HFA) 90 mcg/actuation inhaler, as needed., Disp: , Rfl:   •  aspirin 81 mg enteric coated tablet, Take 81 mg by mouth daily., Disp: , Rfl:   •  atorvastatin (LIPITOR) 40 mg tablet, Take 1 tablet (40 mg total) by mouth nightly., Disp: 90 tablet, Rfl: 3  •  azelastine (ASTELIN) 137 mcg (0.1 %) nasal spray, as needed., Disp: , Rfl:   •  DULoxetine (CYMBALTA) 30 mg capsule, Take 3 capsules (90 mg total) by mouth once daily., Disp: 270 capsule, Rfl: 3  •  eszopiclone (LUNESTA) 3 mg tablet, take 1 tablet by mouth nightly, Disp: 30 tablet, Rfl: 3  •  LORazepam (ATIVAN) 0.5 mg tablet, Take 0.5 mg by mouth as needed., Disp: , Rfl:   •  magnesium oxide (MAG-OX) 400 mg (241.3 mg magnesium) tablet, Take 1 tablet (400 mg total) by mouth 2 (two) times a day., Disp: 180 tablet, Rfl: 3  •  metoprolol succinate XL (TOPROL-XL) 25 mg 24 hr tablet, Take 0.5 tablets (12.5 mg total) by mouth daily., Disp: 45 tablet, Rfl: 3  •  montelukast (SINGULAIR) 10 mg tablet, Take 1 tablet by mouth nightly., Disp: , Rfl:       BP Readings from Last 3 Encounters:   11/18/22 102/64   09/07/22 116/76   05/26/22 120/80       Recent Lab results:  Lab Results   Component Value Date    CHOL 141 05/26/2022   ,   Lab Results   Component Value Date    HDL 79 05/26/2022   ,   Lab Results   Component Value Date    LDLCALC 48 05/26/2022   ,   Lab Results   Component Value Date    TRIG 68 05/26/2022        Lab Results   Component Value Date    GLUCOSE 97 06/30/2022   ,   Lab Results   Component Value Date    HGBA1C 5.7 (H) 06/30/2022         Lab Results   Component Value Date    CREATININE 1.1 06/30/2022       Lab Results   Component Value Date    TSH 2.18 05/26/2022

## 2022-12-09 ENCOUNTER — TRANSCRIBE ORDERS (OUTPATIENT)
Dept: SCHEDULING | Age: 62
End: 2022-12-09

## 2022-12-09 ENCOUNTER — APPOINTMENT (OUTPATIENT)
Dept: LAB | Facility: HOSPITAL | Age: 62
End: 2022-12-09
Attending: INTERNAL MEDICINE
Payer: COMMERCIAL

## 2022-12-09 DIAGNOSIS — D64.9 ANEMIA, UNSPECIFIED: ICD-10-CM

## 2022-12-09 DIAGNOSIS — D75.839 THROMBOCYTOSIS, UNSPECIFIED: ICD-10-CM

## 2022-12-09 DIAGNOSIS — D64.9 ANEMIA, UNSPECIFIED: Primary | ICD-10-CM

## 2022-12-09 LAB
BASOPHILS # BLD: 0.05 K/UL (ref 0.01–0.1)
BASOPHILS NFR BLD: 1 %
DIFFERENTIAL METHOD BLD: ABNORMAL
EOSINOPHIL # BLD: 0.39 K/UL (ref 0.04–0.36)
EOSINOPHIL NFR BLD: 7.7 %
ERYTHROCYTE [DISTWIDTH] IN BLOOD BY AUTOMATED COUNT: 14.2 % (ref 11.7–14.4)
HCT VFR BLDCO AUTO: 37.7 % (ref 35–45)
HGB BLD-MCNC: 12.1 G/DL (ref 11.8–15.7)
IMM GRANULOCYTES # BLD AUTO: 0 K/UL (ref 0–0.08)
IMM GRANULOCYTES NFR BLD AUTO: 0 %
LYMPHOCYTES # BLD: 1.74 K/UL (ref 1.2–3.5)
LYMPHOCYTES NFR BLD: 34.3 %
MCH RBC QN AUTO: 28.5 PG (ref 28–33.2)
MCHC RBC AUTO-ENTMCNC: 32.1 G/DL (ref 32.2–35.5)
MCV RBC AUTO: 88.7 FL (ref 83–98)
MONOCYTES # BLD: 0.39 K/UL (ref 0.28–0.8)
MONOCYTES NFR BLD: 7.7 %
NEUTROPHILS # BLD: 2.5 K/UL (ref 1.7–7)
NEUTROPHILS # BLD: 2.5 K/UL (ref 1.7–7)
NEUTS SEG NFR BLD: 49.3 %
NRBC BLD-RTO: 0 %
PDW BLD AUTO: 10.4 FL (ref 9.4–12.3)
PLATELET # BLD AUTO: 296 K/UL (ref 150–369)
RBC # BLD AUTO: 4.25 M/UL (ref 3.93–5.22)
WBC # BLD AUTO: 5.07 K/UL (ref 3.8–10.5)

## 2022-12-09 PROCEDURE — 36415 COLL VENOUS BLD VENIPUNCTURE: CPT

## 2022-12-09 PROCEDURE — 85025 COMPLETE CBC W/AUTO DIFF WBC: CPT

## 2022-12-19 NOTE — PROCEDURE: COUNSELING
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  Tooth J-DOL carious fracture with large decay  Patient reports no pain  No pain on palpation or percussion of any teeth  Oral Hygiene instruction reviewed and given  Recommended Hygiene recall visits with the [address]  Treatment Plan:  Needs Panoramic radiograph ASAP for complete Tx planning  1   Infection control: Likely Ext-T  2  Periodontal therapy: child prophy completed previously  3  Caries control: as charted  4  Occlusal evaluation: Eval at next visit  5  Case Difficulty Type 2  Prognosis is Good    Referrals needed: Needs Pan in clinic ASAP  Next Visit:  Pan and Tx planning in clinic Detail Level: Zone

## 2023-03-06 ENCOUNTER — APPOINTMENT (EMERGENCY)
Dept: RADIOLOGY | Facility: HOSPITAL | Age: 63
End: 2023-03-06
Payer: COMMERCIAL

## 2023-03-06 ENCOUNTER — HOSPITAL ENCOUNTER (EMERGENCY)
Facility: HOSPITAL | Age: 63
Discharge: HOME | End: 2023-03-06
Attending: EMERGENCY MEDICINE
Payer: COMMERCIAL

## 2023-03-06 VITALS
HEIGHT: 66 IN | TEMPERATURE: 97.1 F | WEIGHT: 130 LBS | HEART RATE: 85 BPM | RESPIRATION RATE: 20 BRPM | DIASTOLIC BLOOD PRESSURE: 56 MMHG | OXYGEN SATURATION: 99 % | SYSTOLIC BLOOD PRESSURE: 101 MMHG | BODY MASS INDEX: 20.89 KG/M2

## 2023-03-06 DIAGNOSIS — K57.92 DIVERTICULITIS: Primary | ICD-10-CM

## 2023-03-06 LAB
ALBUMIN SERPL-MCNC: 3.9 G/DL (ref 3.4–5)
ALP SERPL-CCNC: 109 IU/L (ref 35–126)
ALT SERPL-CCNC: 41 IU/L (ref 11–54)
ANION GAP SERPL CALC-SCNC: 12 MEQ/L (ref 3–15)
AST SERPL-CCNC: 37 IU/L (ref 15–41)
BACTERIA URNS QL MICRO: ABNORMAL /HPF
BASOPHILS # BLD: 0.06 K/UL (ref 0.01–0.1)
BASOPHILS NFR BLD: 0.4 %
BILIRUB SERPL-MCNC: 0.7 MG/DL (ref 0.3–1.2)
BILIRUB UR QL STRIP.AUTO: NEGATIVE MG/DL
BUN SERPL-MCNC: 9 MG/DL (ref 8–20)
CALCIUM SERPL-MCNC: 9.9 MG/DL (ref 8.9–10.3)
CHLORIDE SERPL-SCNC: 101 MEQ/L (ref 98–109)
CLARITY UR REFRACT.AUTO: CLEAR
CO2 SERPL-SCNC: 23 MEQ/L (ref 22–32)
COLOR UR AUTO: COLORLESS
CREAT SERPL-MCNC: 1 MG/DL (ref 0.6–1.1)
DIFFERENTIAL METHOD BLD: ABNORMAL
EOSINOPHIL # BLD: 0.1 K/UL (ref 0.04–0.36)
EOSINOPHIL NFR BLD: 0.7 %
ERYTHROCYTE [DISTWIDTH] IN BLOOD BY AUTOMATED COUNT: 13.8 % (ref 11.7–14.4)
GFR SERPL CREATININE-BSD FRML MDRD: 55.8 ML/MIN/1.73M*2
GLUCOSE SERPL-MCNC: 99 MG/DL (ref 70–99)
GLUCOSE UR STRIP.AUTO-MCNC: NEGATIVE MG/DL
HCT VFR BLDCO AUTO: 42.1 % (ref 35–45)
HGB BLD-MCNC: 14 G/DL (ref 11.8–15.7)
HGB UR QL STRIP.AUTO: NEGATIVE
HYALINE CASTS #/AREA URNS LPF: ABNORMAL /LPF
IMM GRANULOCYTES # BLD AUTO: 0.08 K/UL (ref 0–0.08)
IMM GRANULOCYTES NFR BLD AUTO: 0.6 %
KETONES UR STRIP.AUTO-MCNC: NEGATIVE MG/DL
LEUKOCYTE ESTERASE UR QL STRIP.AUTO: 1
LYMPHOCYTES # BLD: 2.11 K/UL (ref 1.2–3.5)
LYMPHOCYTES NFR BLD: 15 %
MCH RBC QN AUTO: 29.6 PG (ref 28–33.2)
MCHC RBC AUTO-ENTMCNC: 33.3 G/DL (ref 32.2–35.5)
MCV RBC AUTO: 89 FL (ref 83–98)
MONOCYTES # BLD: 0.69 K/UL (ref 0.28–0.8)
MONOCYTES NFR BLD: 4.9 %
NEUTROPHILS # BLD: 11.02 K/UL (ref 1.7–7)
NEUTS SEG NFR BLD: 78.4 %
NITRITE UR QL STRIP.AUTO: NEGATIVE
NRBC BLD-RTO: 0 %
PDW BLD AUTO: 9.9 FL (ref 9.4–12.3)
PH UR STRIP.AUTO: 6.5 [PH]
PLATELET # BLD AUTO: 474 K/UL (ref 150–369)
POTASSIUM SERPL-SCNC: 4 MEQ/L (ref 3.6–5.1)
PROT SERPL-MCNC: 7.3 G/DL (ref 6–8.2)
PROT UR QL STRIP.AUTO: NEGATIVE
RBC # BLD AUTO: 4.73 M/UL (ref 3.93–5.22)
RBC #/AREA URNS HPF: ABNORMAL /HPF
SODIUM SERPL-SCNC: 136 MEQ/L (ref 136–144)
SP GR UR REFRACT.AUTO: 1.01
SQUAMOUS URNS QL MICRO: ABNORMAL /HPF
TRANS CELLS URNS QL MICRO: ABNORMAL /HPF
UROBILINOGEN UR STRIP-ACNC: 0.2 EU/DL
WBC # BLD AUTO: 14.06 K/UL (ref 3.8–10.5)
WBC #/AREA URNS HPF: ABNORMAL /HPF

## 2023-03-06 PROCEDURE — 3E033NZ INTRODUCTION OF ANALGESICS, HYPNOTICS, SEDATIVES INTO PERIPHERAL VEIN, PERCUTANEOUS APPROACH: ICD-10-PCS

## 2023-03-06 PROCEDURE — 63700000 HC SELF-ADMINISTRABLE DRUG

## 2023-03-06 PROCEDURE — 96374 THER/PROPH/DIAG INJ IV PUSH: CPT

## 2023-03-06 PROCEDURE — 63600105 HC IODINE BASED CONTRAST: Mod: JW

## 2023-03-06 PROCEDURE — 36415 COLL VENOUS BLD VENIPUNCTURE: CPT

## 2023-03-06 PROCEDURE — 81001 URINALYSIS AUTO W/SCOPE: CPT | Performed by: EMERGENCY MEDICINE

## 2023-03-06 PROCEDURE — 80053 COMPREHEN METABOLIC PANEL: CPT | Performed by: EMERGENCY MEDICINE

## 2023-03-06 PROCEDURE — 80053 COMPREHEN METABOLIC PANEL: CPT

## 2023-03-06 PROCEDURE — 85025 COMPLETE CBC W/AUTO DIFF WBC: CPT | Performed by: EMERGENCY MEDICINE

## 2023-03-06 PROCEDURE — 85025 COMPLETE CBC W/AUTO DIFF WBC: CPT

## 2023-03-06 PROCEDURE — 81003 URINALYSIS AUTO W/O SCOPE: CPT

## 2023-03-06 PROCEDURE — 87086 URINE CULTURE/COLONY COUNT: CPT | Performed by: EMERGENCY MEDICINE

## 2023-03-06 PROCEDURE — G1004 CDSM NDSC: HCPCS

## 2023-03-06 PROCEDURE — 63600000 HC DRUGS/DETAIL CODE

## 2023-03-06 PROCEDURE — 87086 URINE CULTURE/COLONY COUNT: CPT

## 2023-03-06 PROCEDURE — 99284 EMERGENCY DEPT VISIT MOD MDM: CPT | Mod: 25

## 2023-03-06 RX ORDER — AMOXICILLIN AND CLAVULANATE POTASSIUM 875; 125 MG/1; MG/1
1 TABLET, FILM COATED ORAL
Qty: 14 TABLET | Refills: 0 | Status: SHIPPED | OUTPATIENT
Start: 2023-03-06 | End: 2023-03-13

## 2023-03-06 RX ORDER — AMOXICILLIN AND CLAVULANATE POTASSIUM 875; 125 MG/1; MG/1
1 TABLET, FILM COATED ORAL ONCE
Status: COMPLETED | OUTPATIENT
Start: 2023-03-06 | End: 2023-03-06

## 2023-03-06 RX ORDER — KETOROLAC TROMETHAMINE 15 MG/ML
15 INJECTION, SOLUTION INTRAMUSCULAR; INTRAVENOUS ONCE
Status: COMPLETED | OUTPATIENT
Start: 2023-03-06 | End: 2023-03-06

## 2023-03-06 RX ADMIN — AMOXICILLIN AND CLAVULANATE POTASSIUM 1 TABLET: 875; 125 TABLET, FILM COATED ORAL at 15:29

## 2023-03-06 RX ADMIN — KETOROLAC TROMETHAMINE 15 MG: 15 INJECTION, SOLUTION INTRAMUSCULAR; INTRAVENOUS at 14:58

## 2023-03-06 RX ADMIN — IOHEXOL 75 ML: 350 INJECTION, SOLUTION INTRAVENOUS at 14:38

## 2023-03-06 ASSESSMENT — ENCOUNTER SYMPTOMS
PALPITATIONS: 0
CHILLS: 0
BACK PAIN: 0
FEVER: 0
COLOR CHANGE: 0
COUGH: 0
HEADACHES: 0
ARTHRALGIAS: 0
EYE PAIN: 0
NAUSEA: 1
FLANK PAIN: 0
SORE THROAT: 0
HEMATURIA: 0
ABDOMINAL PAIN: 1
SHORTNESS OF BREATH: 0
SEIZURES: 0
DYSURIA: 0
DIZZINESS: 0
VOMITING: 0
DIARRHEA: 0

## 2023-03-06 NOTE — Clinical Note
Brian Dean was seen and treated in our emergency department on 3/6/2023.  Brian Dean may return to work on 03/08/2023.       If you have any questions or concerns, please don't hesitate to call.      Noble Escamilla PA C

## 2023-03-06 NOTE — ED PROVIDER NOTES
"Emergency Medicine Note  HPI   HISTORY OF PRESENT ILLNESS     Patient is a 62-year-old male with a history of CAD, asthma, fibromuscular dysplasia c/b coronary dissection (s/p CABG), brain aneurysm, renal artery involvement, who presents to ED for left lower quadrant abdominal pain.  Patient states that the pain is sharp, occurs for less than 10 minutes at a time, unsure of precipitating factors, unsure of exacerbating relieving factors.  Patient has not taken any medication or done any treatment at home to help improve his symptoms.  Patient states she could not sleep last night secondary to pain.  Per chart review patient on 2/2/2023 had a colonoscopy, on 2/25/2023 had a GI virus where she had abdominal pain nausea and vomiting which lasted about 48 hours,  had similar symptoms.  Patient states the pain has continued since then. Per chart review, colonoscopy showed \"sigmoid diverticulosis and redundant sigmoid colon\".  Patient also states that she has had intermittent nausea throughout this past week.  Denies fevers, chills, vomiting, diarrhea, constipation, dysuria, materia, urinary frequency/urgency, melena, hematochezia.       History provided by:  Patient   used: No    Abdominal Pain  Associated symptoms: nausea    Associated symptoms: no chest pain, no chills, no cough, no diarrhea, no dysuria, no fever, no hematuria, no shortness of breath, no sore throat and no vomiting          Patient History   PAST HISTORY     Reviewed from Nursing Triage:       Past Medical History:   Diagnosis Date   • Allergies    • Anxiety    • Asthma    • Coronary artery disease    • Coronary artery dissection    • Heart disease    • Lipid disorder    • NSTEMI (non-ST elevated myocardial infarction) (CMS/[facility])    • Parvovirus B19 infection        Past Surgical History:   Procedure Laterality Date   • CORONARY ARTERY BYPASS GRAFT  07/16/2021    robotic - single bypass   • REDUCTION MAMMAPLASTY     • " REDUCTION MAMMAPLASTY         Family History   Problem Relation Age of Onset   • Heart disease Biological Mother    • Hypertension Biological Mother    • Atrial fibrillation Biological Mother    • Diabetes Biological Mother    • Heart disease Biological Father    • Heart disease Biological Brother        Social History     Tobacco Use   • Smoking status: Never   • Smokeless tobacco: Never   Vaping Use   • Vaping status: Never Used   Substance Use Topics   • Alcohol use: Never   • Drug use: Yes     Types: Marijuana         Review of Systems   REVIEW OF SYSTEMS     Review of Systems   Constitutional: Negative for chills and fever.   HENT: Negative for ear pain and sore throat.    Eyes: Negative for pain and visual disturbance.   Respiratory: Negative for cough and shortness of breath.    Cardiovascular: Negative for chest pain and palpitations.   Gastrointestinal: Positive for abdominal pain and nausea. Negative for diarrhea and vomiting.   Genitourinary: Negative for dysuria, flank pain and hematuria.   Musculoskeletal: Negative for arthralgias and back pain.   Skin: Negative for color change and rash.   Neurological: Negative for dizziness, seizures, syncope and headaches.   All other systems reviewed and are negative.        VITALS     ED Vitals    Date/Time Temp Pulse Resp BP SpO2 [facility]   03/06/23 1529 -- -- -- 101/56 -- JUAN MANUEL   03/06/23 1501 -- 85 20 -- 99 % JLR   03/06/23 1321 -- 84 20 126/62 100 % KMP   03/06/23 1201 36.2 °C (97.1 °F) 84 20 123/59 99 % DA        Pulse Ox %: 99 % (03/06/23 1303)  Pulse Ox Interpretation: Normal (03/06/23 1303)  Heart Rate: 84 (03/06/23 1303)  Rhythm Strip Interpretation: Normal Sinus Rhythm (03/06/23 1303)     Physical Exam   PHYSICAL EXAM     Physical Exam  Vitals and nursing note reviewed.   Constitutional:       General: She is not in acute distress.     Appearance: She is well-developed. She is not ill-appearing, toxic-appearing or diaphoretic.   HENT:      Head: Normocephalic  and atraumatic.      Nose: No congestion or rhinorrhea.      Mouth/Throat:      Mouth: Mucous membranes are moist.   Eyes:      Extraocular Movements: Extraocular movements intact.      Conjunctiva/sclera: Conjunctivae normal.      Pupils: Pupils are equal, round, and reactive to light.   Cardiovascular:      Rate and Rhythm: Normal rate and regular rhythm.      Heart sounds: Normal heart sounds.   Pulmonary:      Effort: Pulmonary effort is normal. No respiratory distress.      Breath sounds: Normal breath sounds.   Abdominal:      Palpations: Abdomen is soft.      Tenderness: There is abdominal tenderness in the left lower quadrant. There is no right CVA tenderness or left CVA tenderness.   Musculoskeletal:         General: No tenderness.      Right lower leg: No edema.      Left lower leg: No edema.   Skin:     General: Skin is warm and dry.      Capillary Refill: Capillary refill takes less than 2 seconds.   Neurological:      Mental Status: She is alert and oriented to person, place, and time.   Psychiatric:         Mood and Affect: Mood normal.           PROCEDURES     Procedures     DATA     Results     Procedure Component Value Units Date/Time    Urinalysis with Reflex Culture [[id number]]  (Abnormal) Collected: 03/06/23 1319    Specimen: Urine, Clean Catch Updated: 03/06/23 1406    Narrative:      The following orders were created for panel order Urinalysis with Reflex Culture.  Procedure                               Abnormality         Status                     ---------                               -----------         ------                     UA Reflex to Culture (Ma...[[id number]]  Abnormal            Final result               UA Microscopic[[id number]]               Abnormal            Final result                 Please view results for these tests on the individual orders.    UA Microscopic [[id number]]  (Abnormal) Collected: 03/06/23 1319    Specimen: Urine, Clean Catch Updated: 03/06/23 1401      RBC, Urine 0 TO 4 /HPF      WBC, Urine 4 TO 10 /HPF      Squamous Epithelial Rare /hpf      Hyaline Cast None Seen /lpf      Bacteria, Urine None Seen /HPF      Transitional Epithelial Rare /hpf     Comprehensive metabolic panel [[id number]]  (Abnormal) Collected: 03/06/23 1314    Specimen: Blood, Venous Updated: 03/06/23 1406     Sodium 136 mEQ/L      Potassium 4.0 mEQ/L      Comment: Results obtained on plasma. Plasma Potassium values may be up to 0.4 mEQ/L less than serum values. The differences may be greater for patients with high platelet or white cell counts.        Chloride 101 mEQ/L      CO2 23 mEQ/L      BUN 9 mg/dL      Creatinine 1.0 mg/dL      Glucose 99 mg/dL      Calcium 9.9 mg/dL      AST (SGOT) 37 IU/L      ALT (SGPT) 41 IU/L      Alkaline Phosphatase 109 IU/L      Total Protein 7.3 g/dL      Comment: Test performed on plasma which typically contains approximately 0.4 g/dL more protein than serum.        Albumin 3.9 g/dL      Bilirubin, Total 0.7 mg/dL      eGFR 55.8 mL/min/1.73m*2      Anion Gap 12 mEQ/L     CBC and differential [[id number]]  (Abnormal) Collected: 03/06/23 1314    Specimen: Blood, Venous Updated: 03/06/23 1357     WBC 14.06 K/uL      RBC 4.73 M/uL      Hemoglobin 14.0 g/dL      Hematocrit 42.1 %      MCV 89.0 fL      MCH 29.6 pg      MCHC 33.3 g/dL      RDW 13.8 %      Platelets 474 K/uL      MPV 9.9 fL      Differential Type Auto     nRBC 0.0 %      Immature Granulocytes 0.6 %      Neutrophils 78.4 %      Lymphocytes 15.0 %      Monocytes 4.9 %      Eosinophils 0.7 %      Basophils 0.4 %      Immature Granulocytes, Absolute 0.08 K/uL      Neutrophils, Absolute 11.02 K/uL      Lymphocytes, Absolute 2.11 K/uL      Monocytes, Absolute 0.69 K/uL      Eosinophils, Absolute 0.10 K/uL      Basophils, Absolute 0.06 K/uL     UA Reflex to Culture (Macroscopic) [[id number]]  (Abnormal) Collected: 03/06/23 1319    Specimen: Urine, Clean Catch Updated: 03/06/23 1350     Color, Urine Colorless      Clarity, Urine Clear     Specific Gravity, Urine 1.006     pH, Urine 6.5     Leukocyte Esterase +1     Nitrite, Urine Negative     Protein, Urine Negative     Glucose, Urine Negative mg/dL      Ketones, Urine Negative mg/dL      Urobilinogen, Urine 0.2 EU/dL      Bilirubin, Urine Negative mg/dL      Blood, Urine Negative     Comment: The sensitivity of the occult blood test is equivalent to approximately 4 intact RBC/HPF.             Imaging Results          CT ABDOMEN PELVIS WITH IV CONTRAST (Final result)  Result time 03/06/23 14:49:58    Final result                 Impression:    IMPRESSION: Findings compatible with acute sigmoid diverticulitis. No focal  drainable collection at this time.             Narrative:    CLINICAL HISTORY: Left lower quadrant abdominal pain    COMMENT: Contrast-enhanced CT the abdomen pelvis performed.    CT DOSE:  One or more dose reduction techniques (e.g. automated exposure  control, adjustment of the mA and/or kV according to patient size, use of  iterative reconstruction technique) utilized for this examination    100 cc of Omnipaque 350 intravenous contrast was administered.    Comparison studies: No relevant prior abdominal imaging exams currently  available.    FINDINGS:    Liver: Unremarkable    Gallbladder: The gallbladder appears mildly distended but no calcified  gallstones are appreciated. No gross pericholecystic fluid.    Spleen: Unremarkable    Pancreas: Unremarkable    Adrenal glands: Unremarkable    Kidneys: Kidneys enhance and excrete without hydronephrosis.    Bowel: There is a very small hiatal hernia. No abnormal bowel dilatation or  other evidence of obstruction. Colonic diverticulosis noted with proximal  sigmoid wall thickening and surrounding pericolic inflammatory appearing  stranding. This is most compatible with acute sigmoid diverticulitis. No  discrete extraluminal collection appreciated. No definite ascites.    Urinary bladder:  Unremarkable    Reproductive organs: No cysts this is pelvic masses appreciated.                                No orders to display       Scoring tools                                  ED Course & MDM   MDM / ED COURSE / CLINICAL IMPRESSION / DISPO     Medical Decision Making  Differential is broad, consider central emergency diverticulitis, bowel perforation, appendicitis, other serious diagnoses.  Will order labs and imaging as above and reassess.  Patient's lab work shows leukocytosis to 12, CT abdomen shows diverticulitis, no abscess or bowel push.  Will treat patient with Augmentin.  Discussed strict return precautions, discussed importance of follow-up with PCP and GI, discussed all results, patient verbalized understanding.    Diverticulitis: acute illness or injury  Amount and/or Complexity of Data Reviewed  External Data Reviewed: notes.     Details: Reviewed recent colonoscopy notes from [place] pain communication with GI doctor  Labs: ordered.  Radiology: ordered and independent interpretation performed. Decision-making details documented in ED Course.      Risk  Prescription drug management.          ED Course as of 03/08/23 1212   Mon Mar 06, 2023   1502 CT ABDOMEN PELVIS WITH IV CONTRAST  Per radiology: Findings compatible with acute sigmoid diverticulitis. No focal drainable collection at this time.      Independently reviewed imaging, agree with radiology read [JS]      ED Course User Index  [JS] Noble Escamilla PA C     Clinical Impression      Diverticulitis     _________________     ED Disposition   Discharge                   Noble Escamilla PA C  03/08/23 1212

## 2023-03-06 NOTE — DISCHARGE INSTRUCTIONS
As we discussed your CAT scan showed diverticulitis.  Please take the full course of antibiotics, this will help you feel better.  Please follow-up with your GI doctor.    Please follow up as directed to obtain your results and review your plan of care. CALL your primary doctor's office today to schedule a follow up appointment within the next 48 hours.     REVIEW AND DISCUSS ALL OF YOUR MEDICATIONS WITH YOUR PRIMARY CARE TEAM WITHIN THE NEXT 2 DAYS, even ones that you may have been on for a long time.    Radiology review of your studies (XRAYs, CT Scans, Ultrasounds, MRI scans) may still be pending. Preliminary results may be available today but final written reports may not be available until tomorrow. Important findings may be included in the final radiology review.     Please CALL the Emergency Department at [phone] to obtain the final results within the next 24 hours. Review the final results of all of your tests with your primary care doctor within the next 2 days.     Cultures and uncommon labs may take 2 days or more before results are available. Please ask about all pending tests until the final results are known. You may not be notified of important test results unless you ask for these when you call or when you follow up.    Please call or visit your primary doctor or return to this Emergency Department (or the closest Emergency Department) if you have new concerns, if you are not getting better, or if  you feel that you are getting worse.    Thank you and good luck in your recovery!

## 2023-03-07 LAB
BACTERIA UR CULT: NORMAL
BACTERIA UR CULT: NORMAL

## 2023-03-08 NOTE — ED ATTESTATION NOTE
The patient was evaluated and managed by the physician assistant / nurse practitioner.         Parviz Zhang MD  03/08/23 3389

## 2023-03-19 NOTE — ASSESSMENT & PLAN NOTE
12/27/ 2005, she had a myocardial infarction due to a coronary artery dissection. Cath report said a second diagonal branch was the culprit vessel.  No PCI was performed. She presented at that time with severe jaw pain that then radiated into the chest and left arm. It was associated with severe nausea and some dyspnea.        7/14/21 R/L heart catheterization showed 70% mid LAD focal stenosis just distal to a large diagonal vessel.  The borders of the lesion appear smooth and the lack of other angiographic disease suggests FMD or intramural hematoma as etiology rather than atherosclerotic disease.     Robotic HEARD to LAD on 7/16/21       No angina  Stable ECG  Continue ASA

## 2023-03-19 NOTE — ASSESSMENT & PLAN NOTE
7/14/2021 right/left heart catheterization showed Low -normal LVEDP  Right heart catheterization showed low right and left heart filling pressures (RA 1, PA 30/8 (15), and PCW 3 mm Hg).  Cardiac output and index of 6.32 L/min and 3.9 L/min/m2 respectively.         7/15/2021 echo with LVEF of 60%, no regional wall motion abnormalities, impaired relaxation with trace tricuspid regurgitation and estimated RVSP of 26 mmHg.    Has not recurred

## 2023-03-19 NOTE — ASSESSMENT & PLAN NOTE
She underwent robotic bypass x1, LIMA to LAD, on 7/16/2021..        Continue ASA and atorvastatin

## 2023-03-19 NOTE — ASSESSMENT & PLAN NOTE
7/14/21 R/L heart catheterization showed 70% mid LAD focal stenosis just distal to a large diagonal vessel.  The borders of the lesion appear smooth and the lack of other angiographic disease suggests FMD or intramural hematoma as etiology rather than atherosclerotic disease.     7/14/2021 carotid ultrasound showed no hemodynamically significant stenosis bilaterally.        She was seen by Dr. Ricardo Otero at [facility] in [place] for evaluation for FMD.   CTA or head/neck showed 3 mm intracranial (left supraclinoid internal cerebral artery) aneurysm.       4/18/22 MRA of head showed stable 3 mm intracranial aneurysm- Dr. Otero recommended evaluation by Neuro-interventionalist and yearly MRA of brain.     3/9/22 CTA of chest/abdomen showed mild beading of proximal left external iliac artery and distal branch of right renal-suspicious for FMD  LIMA to LAD appeared patent, luminal irregularities of mid LAD-just prior to anastomosis.  Mid RCA evaluation limited by motion artifact  No high grade stenosis suggested of Left Main or LCX.  Aorta-no evidence of narrowing, dissection or aneurysm.       She will need follow up of her cerebral and renal arteries for her FMD.

## 2023-03-20 ENCOUNTER — OFFICE VISIT (OUTPATIENT)
Dept: CARDIOLOGY | Facility: CLINIC | Age: 63
End: 2023-03-20
Payer: COMMERCIAL

## 2023-03-20 VITALS
WEIGHT: 134 LBS | OXYGEN SATURATION: 99 % | DIASTOLIC BLOOD PRESSURE: 68 MMHG | HEIGHT: 66 IN | RESPIRATION RATE: 16 BRPM | SYSTOLIC BLOOD PRESSURE: 122 MMHG | HEART RATE: 60 BPM | BODY MASS INDEX: 21.53 KG/M2

## 2023-03-20 DIAGNOSIS — I21.4 NSTEMI (NON-ST ELEVATED MYOCARDIAL INFARCTION) (CMS/HCC): ICD-10-CM

## 2023-03-20 DIAGNOSIS — N18.31 STAGE 3A CHRONIC KIDNEY DISEASE (CMS/HCC): ICD-10-CM

## 2023-03-20 DIAGNOSIS — R06.09 DOE (DYSPNEA ON EXERTION): ICD-10-CM

## 2023-03-20 DIAGNOSIS — E83.42 HYPOMAGNESEMIA: ICD-10-CM

## 2023-03-20 DIAGNOSIS — I77.3 FIBROMUSCULAR DYSPLASIA (CMS/HCC): ICD-10-CM

## 2023-03-20 DIAGNOSIS — I25.42 CORONARY ARTERY DISSECTION: Primary | ICD-10-CM

## 2023-03-20 DIAGNOSIS — E87.5 HYPERKALEMIA: ICD-10-CM

## 2023-03-20 DIAGNOSIS — Z95.1 S/P CABG X 1: ICD-10-CM

## 2023-03-20 DIAGNOSIS — Z86.79 HISTORY OF PERICARDITIS: ICD-10-CM

## 2023-03-20 DIAGNOSIS — E78.5 DYSLIPIDEMIA: ICD-10-CM

## 2023-03-20 PROCEDURE — 3008F BODY MASS INDEX DOCD: CPT | Performed by: INTERNAL MEDICINE

## 2023-03-20 PROCEDURE — 93000 ELECTROCARDIOGRAM COMPLETE: CPT | Performed by: INTERNAL MEDICINE

## 2023-03-20 PROCEDURE — 99214 OFFICE O/P EST MOD 30 MIN: CPT | Performed by: INTERNAL MEDICINE

## 2023-03-20 ASSESSMENT — ENCOUNTER SYMPTOMS
BACK PAIN: 0
NAUSEA: 0
ABDOMINAL PAIN: 1
CLAUDICATION: 0
DIAPHORESIS: 0
DYSPNEA ON EXERTION: 0
DEPRESSION: 0
COUGH: 0
HEARTBURN: 0
PALPITATIONS: 0
DIZZINESS: 0
SHORTNESS OF BREATH: 0
NERVOUS/ANXIOUS: 0
WEIGHT GAIN: 0
SNORING: 0

## 2023-03-20 NOTE — LETTER
March 20, 2023     Leeann Ch MD  [address] [facility]  [address]  [facility] [address]    Patient: Brian Dean  YOB: 1960  Date of Visit: 3/20/2023      Dear Dr. Osiris Ch:    Thank you for referring Brian Dean to me for evaluation. Below are my notes for this consultation.    If you have questions, please do not hesitate to call me. I look forward to following your patient along with you.         Sincerely,        Eleazar Hall MD        CC: No Recipients    Eleazar Hall MD  3/20/2023  1:29 PM  Signed     Cardiology Note          HPI   Brian Dean is a 62 y.o. female was last seen on 9/7/22.  She is walking.   Patient  denies chest distress, MCGUIRE, orthopnea, PND, edema, palpitations, syncope or near syncope.   Her feet are cold at times.   She had a bout of diverticulitis a couple weeks ago.       Past Medical History:   Diagnosis Date   • Allergies    • Anxiety    • Asthma    • Coronary artery disease    • Coronary artery dissection    • Diverticulitis of colon    • Heart disease    • Lipid disorder    • NSTEMI (non-ST elevated myocardial infarction) (CMS/[facility])    • Parvovirus B19 infection        Past Surgical History:   Procedure Laterality Date   • CORONARY ARTERY BYPASS GRAFT  07/16/2021    robotic - single bypass   • REDUCTION MAMMAPLASTY     • REDUCTION MAMMAPLASTY         Social History     Tobacco Use   • Smoking status: Never   • Smokeless tobacco: Never   Vaping Use   • Vaping status: Never Used   Substance Use Topics   • Alcohol use: Never   • Drug use: Yes     Types: Marijuana       Family History   Problem Relation Age of Onset   • Heart disease Biological Mother    • Hypertension Biological Mother    • Atrial fibrillation Biological Mother    • Diabetes Biological Mother    • Heart disease Biological Father    • Heart disease Biological Brother        Allergies  Sulfa (sulfonamide antibiotics) and Sulfamethoxazole-trimethoprim    Current Outpatient  Medications   Medication Sig Dispense Refill   • albuterol HFA (VENTOLIN HFA) 90 mcg/actuation inhaler as needed.     • aspirin 81 mg enteric coated tablet Take 81 mg by mouth daily.     • atorvastatin (LIPITOR) 40 mg tablet Take 1 tablet (40 mg total) by mouth nightly. 90 tablet 3   • azelastine (ASTELIN) 137 mcg (0.1 %) nasal spray as needed.     • DULoxetine (CYMBALTA) 30 mg capsule Take 3 capsules (90 mg total) by mouth once daily. 270 capsule 3   • eszopiclone (LUNESTA) 3 mg tablet take 1 tablet by mouth nightly 30 tablet 3   • LORazepam (ATIVAN) 0.5 mg tablet Take 0.5 mg by mouth as needed.     • magnesium oxide (MAG-OX) 400 mg (241.3 mg magnesium) tablet Take 1 tablet (400 mg total) by mouth 2 (two) times a day. 180 tablet 3   • metoprolol succinate XL (TOPROL-XL) 25 mg 24 hr tablet Take 0.5 tablets (12.5 mg total) by mouth daily. 45 tablet 3   • montelukast (SINGULAIR) 10 mg tablet Take 1 tablet by mouth nightly.       No current facility-administered medications for this visit.         Review of Systems   Constitutional: Negative for diaphoresis and weight gain.   Eyes: Negative for visual disturbance.   Cardiovascular: Negative for chest pain, claudication, dyspnea on exertion, leg swelling and palpitations.   Respiratory: Negative for cough, shortness of breath and snoring.    Skin: Negative for rash.   Musculoskeletal: Negative for arthritis and back pain.   Gastrointestinal: Positive for abdominal pain. Negative for heartburn and nausea.   Genitourinary: Negative for nocturia.   Neurological: Negative for dizziness.   Psychiatric/Behavioral: Negative for depression. The patient is not nervous/anxious.        Objective     Vitals:    03/20/23 1250   BP: 122/68   Pulse: 60   Resp: 16   SpO2: 99%     No orthostatic change   Physical Exam  Constitutional:       Appearance: She is well-developed.   HENT:      Head: Normocephalic.   Eyes:      General:         Right eye: No discharge.         Left eye: No  discharge.   Neck:      Vascular: No JVD.   Cardiovascular:      Rate and Rhythm: Normal rate and regular rhythm.      Heart sounds: Normal heart sounds.   Pulmonary:      Breath sounds: Normal breath sounds.   Abdominal:      General: Bowel sounds are normal.      Palpations: Abdomen is soft.   Musculoskeletal:         General: No deformity.   Skin:     Findings: No rash.   Neurological:      Mental Status: She is alert and oriented to person, place, and time.   Psychiatric:         Behavior: Behavior normal.         Lab Results   Component Value Date    WBC 14.06 (H) 03/06/2023    HGB 14.0 03/06/2023     (H) 03/06/2023    CHOL 141 05/26/2022    TRIG 68 05/26/2022    HDL 79 05/26/2022    ALT 41 03/06/2023    AST 37 03/06/2023     03/06/2023    K 4.0 03/06/2023    CREATININE 1.0 03/06/2023    TSH 2.18 05/26/2022    INR 0.9 07/19/2021    HGBA1C 5.7 (H) 06/30/2022     LDL cholesterol 48       ECG   Sinus, low voltage in limb leads, age indeterminate anteroseptal infarction          Problem List Items Addressed This Visit        High    Coronary artery dissection - Primary     12/27/ 2005, she had a myocardial infarction due to a coronary artery dissection. Cath report said a second diagonal branch was the culprit vessel.  No PCI was performed. She presented at that time with severe jaw pain that then radiated into the chest and left arm. It was associated with severe nausea and some dyspnea.        7/14/21 R/L heart catheterization showed 70% mid LAD focal stenosis just distal to a large diagonal vessel.  The borders of the lesion appear smooth and the lack of other angiographic disease suggests FMD or intramural hematoma as etiology rather than atherosclerotic disease.     Robotic HEARD to LAD on 7/16/21       No angina  Stable ECG  Continue ASA             Relevant Orders    ECG 12 LEAD-OFFICE PERFORMED (Completed)       Medium    Fibromuscular dysplasia (CMS/HCC)     7/14/21 R/L heart catheterization  showed 70% mid LAD focal stenosis just distal to a large diagonal vessel.  The borders of the lesion appear smooth and the lack of other angiographic disease suggests FMD or intramural hematoma as etiology rather than atherosclerotic disease.     7/14/2021 carotid ultrasound showed no hemodynamically significant stenosis bilaterally.        She was seen by Dr. Ricardo Otero at [facility] in [place] for evaluation for FMD.   CTA or head/neck showed 3 mm intracranial (left supraclinoid internal cerebral artery) aneurysm.       4/18/22 MRA of head showed stable 3 mm intracranial aneurysm- Dr. Otero recommended evaluation by Neuro-interventionalist and yearly MRA of brain.     3/9/22 CTA of chest/abdomen showed mild beading of proximal left external iliac artery and distal branch of right renal-suspicious for FMD  LIMA to LAD appeared patent, luminal irregularities of mid LAD-just prior to anastomosis.  Mid RCA evaluation limited by motion artifact  No high grade stenosis suggested of Left Main or LCX.  Aorta-no evidence of narrowing, dissection or aneurysm.       She will need follow up of her cerebral and renal arteries for her FMD.             Low    NSTEMI (non-ST elevated myocardial infarction) (CMS/[facility])     Non-STEMI in 2005 at the time of her coronary artery dissection.         S/P CABG x 1     She underwent robotic bypass x1, LIMA to LAD, on 7/16/2021..        Continue ASA and atorvastatin          History of pericarditis     Patient was seen in the ED on 8/29/21 for complaints of chest pain.  CT of the chest was negative for PE and showed just trace pericardial effusion.  Troponin level negative x1.  She had resumed her NSAID and colchicine. She was treated for presumed pericarditis. She took ibuprofen for a few days with resolution of the symptom.       This has not recurred.             MCGUIRE (dyspnea on exertion)     7/14/2021 right/left heart catheterization showed Low -normal LVEDP  Right heart catheterization  showed low right and left heart filling pressures (RA 1, PA 30/8 (15), and PCW 3 mm Hg).  Cardiac output and index of 6.32 L/min and 3.9 L/min/m2 respectively.         7/15/2021 echo with LVEF of 60%, no regional wall motion abnormalities, impaired relaxation with trace tricuspid regurgitation and estimated RVSP of 26 mmHg.    Has not recurred            Dyslipidemia     LDL of 48 last  Summer of 2022  Takes atorvastatin         RESOLVED: Hypomagnesemia    RESOLVED: Hyperkalemia    RESOLVED: Stage 3a chronic kidney disease (CMS/HCC)     Stable bun/creatinine of 9/1.0 on 3/6 labs             She will return later in the year.    Eleazar Hall MD  3/20/2023

## 2023-03-28 ENCOUNTER — APPOINTMENT (OUTPATIENT)
Dept: LAB | Facility: HOSPITAL | Age: 63
End: 2023-03-28
Attending: INTERNAL MEDICINE
Payer: COMMERCIAL

## 2023-03-28 ENCOUNTER — TRANSCRIBE ORDERS (OUTPATIENT)
Dept: SCHEDULING | Age: 63
End: 2023-03-28

## 2023-03-28 DIAGNOSIS — D75.839 THROMBOCYTOSIS, UNSPECIFIED: ICD-10-CM

## 2023-03-28 DIAGNOSIS — D64.9 ANEMIA, UNSPECIFIED: Primary | ICD-10-CM

## 2023-03-28 DIAGNOSIS — D64.9 ANEMIA, UNSPECIFIED: ICD-10-CM

## 2023-03-28 LAB
BASOPHILS # BLD: 0.05 K/UL (ref 0.01–0.1)
BASOPHILS NFR BLD: 0.8 %
DIFFERENTIAL METHOD BLD: ABNORMAL
EOSINOPHIL # BLD: 0.86 K/UL (ref 0.04–0.36)
EOSINOPHIL NFR BLD: 13.8 %
ERYTHROCYTE [DISTWIDTH] IN BLOOD BY AUTOMATED COUNT: 14.3 % (ref 11.7–14.4)
HCT VFR BLDCO AUTO: 38.6 % (ref 35–45)
HGB BLD-MCNC: 12.3 G/DL (ref 11.8–15.7)
IMM GRANULOCYTES # BLD AUTO: 0.01 K/UL (ref 0–0.08)
IMM GRANULOCYTES NFR BLD AUTO: 0.2 %
LYMPHOCYTES # BLD: 2.15 K/UL (ref 1.2–3.5)
LYMPHOCYTES NFR BLD: 34.5 %
MCH RBC QN AUTO: 28.4 PG (ref 28–33.2)
MCHC RBC AUTO-ENTMCNC: 31.9 G/DL (ref 32.2–35.5)
MCV RBC AUTO: 89.1 FL (ref 83–98)
MONOCYTES # BLD: 0.45 K/UL (ref 0.28–0.8)
MONOCYTES NFR BLD: 7.2 %
NEUTROPHILS # BLD: 2.72 K/UL (ref 1.7–7)
NEUTROPHILS # BLD: 2.72 K/UL (ref 1.7–7)
NEUTS SEG NFR BLD: 43.5 %
NRBC BLD-RTO: 0 %
PDW BLD AUTO: 9.8 FL (ref 9.4–12.3)
PLATELET # BLD AUTO: 349 K/UL (ref 150–369)
RBC # BLD AUTO: 4.33 M/UL (ref 3.93–5.22)
WBC # BLD AUTO: 6.24 K/UL (ref 3.8–10.5)

## 2023-03-28 PROCEDURE — 85025 COMPLETE CBC W/AUTO DIFF WBC: CPT

## 2023-03-28 PROCEDURE — 36415 COLL VENOUS BLD VENIPUNCTURE: CPT

## 2023-04-04 DIAGNOSIS — F41.9 ANXIETY: ICD-10-CM

## 2023-04-04 DIAGNOSIS — F51.01 PRIMARY INSOMNIA: ICD-10-CM

## 2023-04-04 RX ORDER — ESZOPICLONE 3 MG/1
TABLET, FILM COATED ORAL
Qty: 30 TABLET | Refills: 0 | Status: SHIPPED | OUTPATIENT
Start: 2023-04-04 | End: 2023-05-03

## 2023-04-04 NOTE — TELEPHONE ENCOUNTER
Medicine Refill Request    Last Office: 11/18/2022   Last Consult Visit: Visit date not found  Last Telemedicine Visit: 7/1/2022 Osiris Ch, Leeann Jay MD    Next Appointment: Visit date not found      Current Outpatient Medications:   •  albuterol HFA (VENTOLIN HFA) 90 mcg/actuation inhaler, as needed., Disp: , Rfl:   •  aspirin 81 mg enteric coated tablet, Take 81 mg by mouth daily., Disp: , Rfl:   •  atorvastatin (LIPITOR) 40 mg tablet, Take 1 tablet (40 mg total) by mouth nightly., Disp: 90 tablet, Rfl: 3  •  azelastine (ASTELIN) 137 mcg (0.1 %) nasal spray, as needed., Disp: , Rfl:   •  DULoxetine (CYMBALTA) 30 mg capsule, Take 3 capsules (90 mg total) by mouth once daily., Disp: 270 capsule, Rfl: 3  •  eszopiclone (LUNESTA) 3 mg tablet, take 1 tablet by mouth nightly, Disp: 30 tablet, Rfl: 3  •  LORazepam (ATIVAN) 0.5 mg tablet, Take 0.5 mg by mouth as needed., Disp: , Rfl:   •  magnesium oxide (MAG-OX) 400 mg (241.3 mg magnesium) tablet, Take 1 tablet (400 mg total) by mouth 2 (two) times a day., Disp: 180 tablet, Rfl: 3  •  metoprolol succinate XL (TOPROL-XL) 25 mg 24 hr tablet, Take 0.5 tablets (12.5 mg total) by mouth daily., Disp: 45 tablet, Rfl: 3  •  montelukast (SINGULAIR) 10 mg tablet, Take 1 tablet by mouth nightly., Disp: , Rfl:       BP Readings from Last 3 Encounters:   03/20/23 122/68   03/06/23 (!) 101/56   11/18/22 102/64       Recent Lab results:  Lab Results   Component Value Date    CHOL 141 05/26/2022   ,   Lab Results   Component Value Date    HDL 79 05/26/2022   ,   Lab Results   Component Value Date    LDLCALC 48 05/26/2022   ,   Lab Results   Component Value Date    TRIG 68 05/26/2022        Lab Results   Component Value Date    GLUCOSE 99 03/06/2023   ,   Lab Results   Component Value Date    HGBA1C 5.7 (H) 06/30/2022         Lab Results   Component Value Date    CREATININE 1.0 03/06/2023       Lab Results   Component Value Date    TSH 2.18 05/26/2022

## 2023-05-03 ENCOUNTER — DOCUMENTATION (OUTPATIENT)
Dept: FAMILY MEDICINE | Facility: CLINIC | Age: 63
End: 2023-05-03
Payer: COMMERCIAL

## 2023-05-31 ENCOUNTER — OFFICE VISIT (OUTPATIENT)
Dept: ENDOCRINOLOGY | Facility: CLINIC | Age: 63
End: 2023-05-31
Payer: COMMERCIAL

## 2023-05-31 VITALS
BODY MASS INDEX: 21.98 KG/M2 | HEIGHT: 66 IN | HEART RATE: 77 BPM | SYSTOLIC BLOOD PRESSURE: 100 MMHG | WEIGHT: 136.8 LBS | OXYGEN SATURATION: 96 % | DIASTOLIC BLOOD PRESSURE: 70 MMHG

## 2023-05-31 DIAGNOSIS — Z83.3 FAMILY HISTORY OF DIABETES MELLITUS: ICD-10-CM

## 2023-05-31 DIAGNOSIS — E04.1 THYROID NODULE: Primary | ICD-10-CM

## 2023-05-31 PROCEDURE — 3008F BODY MASS INDEX DOCD: CPT | Performed by: INTERNAL MEDICINE

## 2023-05-31 PROCEDURE — 99214 OFFICE O/P EST MOD 30 MIN: CPT | Performed by: INTERNAL MEDICINE

## 2023-05-31 RX ORDER — DILTIAZEM HYDROCHLORIDE 60 MG/1
TABLET, FILM COATED ORAL AS NEEDED
COMMUNITY
Start: 2023-04-14

## 2023-06-01 ENCOUNTER — APPOINTMENT (OUTPATIENT)
Dept: LAB | Facility: HOSPITAL | Age: 63
End: 2023-06-01
Attending: INTERNAL MEDICINE
Payer: COMMERCIAL

## 2023-06-01 DIAGNOSIS — E78.5 DYSLIPIDEMIA: ICD-10-CM

## 2023-06-01 DIAGNOSIS — E04.1 THYROID NODULE: ICD-10-CM

## 2023-06-01 DIAGNOSIS — E83.42 HYPOMAGNESEMIA: ICD-10-CM

## 2023-06-01 LAB
ALBUMIN SERPL-MCNC: 3.8 G/DL (ref 3.4–5)
ALP SERPL-CCNC: 95 IU/L (ref 35–126)
ALT SERPL-CCNC: 25 IU/L (ref 11–54)
ANION GAP SERPL CALC-SCNC: 6 MEQ/L (ref 3–15)
AST SERPL-CCNC: 31 IU/L (ref 15–41)
BASOPHILS # BLD: 0.06 K/UL (ref 0.01–0.1)
BASOPHILS NFR BLD: 1.2 %
BILIRUB SERPL-MCNC: 0.8 MG/DL (ref 0.3–1.2)
BUN SERPL-MCNC: 11 MG/DL (ref 8–20)
CALCIUM SERPL-MCNC: 9.7 MG/DL (ref 8.9–10.3)
CHLORIDE SERPL-SCNC: 101 MEQ/L (ref 98–109)
CHOLEST SERPL-MCNC: 156 MG/DL
CK SERPL-CCNC: 184 U/L (ref 15–200)
CO2 SERPL-SCNC: 28 MEQ/L (ref 22–32)
CREAT SERPL-MCNC: 0.9 MG/DL (ref 0.6–1.1)
DIFFERENTIAL METHOD BLD: ABNORMAL
EOSINOPHIL # BLD: 0.36 K/UL (ref 0.04–0.36)
EOSINOPHIL NFR BLD: 7.2 %
ERYTHROCYTE [DISTWIDTH] IN BLOOD BY AUTOMATED COUNT: 14.5 % (ref 11.7–14.4)
EST. AVERAGE GLUCOSE BLD GHB EST-MCNC: 108 MG/DL
GFR SERPL CREATININE-BSD FRML MDRD: >60 ML/MIN/1.73M*2
GLUCOSE SERPL-MCNC: 112 MG/DL (ref 70–99)
HBA1C MFR BLD: 5.4 %
HCT VFR BLDCO AUTO: 37.7 % (ref 35–45)
HDLC SERPL-MCNC: 77 MG/DL
HDLC SERPL: 2 {RATIO}
HGB BLD-MCNC: 12.4 G/DL (ref 11.8–15.7)
IMM GRANULOCYTES # BLD AUTO: 0.02 K/UL (ref 0–0.08)
IMM GRANULOCYTES NFR BLD AUTO: 0.4 %
LDLC SERPL CALC-MCNC: 63 MG/DL
LYMPHOCYTES # BLD: 1.72 K/UL (ref 1.2–3.5)
LYMPHOCYTES NFR BLD: 34.2 %
MAGNESIUM SERPL-MCNC: 2 MG/DL (ref 1.8–2.5)
MCH RBC QN AUTO: 30.4 PG (ref 28–33.2)
MCHC RBC AUTO-ENTMCNC: 32.9 G/DL (ref 32.2–35.5)
MCV RBC AUTO: 92.4 FL (ref 83–98)
MONOCYTES # BLD: 0.31 K/UL (ref 0.28–0.8)
MONOCYTES NFR BLD: 6.2 %
NEUTROPHILS # BLD: 2.56 K/UL (ref 1.7–7)
NEUTS SEG NFR BLD: 50.8 %
NONHDLC SERPL-MCNC: 79 MG/DL
NRBC BLD-RTO: 0 %
PDW BLD AUTO: 10.7 FL (ref 9.4–12.3)
PLATELET # BLD AUTO: 341 K/UL (ref 150–369)
POTASSIUM SERPL-SCNC: 5.2 MEQ/L (ref 3.6–5.1)
PROT SERPL-MCNC: 6.5 G/DL (ref 6–8.2)
RBC # BLD AUTO: 4.08 M/UL (ref 3.93–5.22)
SODIUM SERPL-SCNC: 135 MEQ/L (ref 136–144)
T4 FREE SERPL-MCNC: 0.72 NG/DL (ref 0.58–1.64)
THYROPEROXIDASE AB SERPL-ACNC: 1.7 IU/ML
TRIGL SERPL-MCNC: 79 MG/DL (ref 30–149)
TSH SERPL DL<=0.05 MIU/L-ACNC: 1.59 MIU/L (ref 0.34–5.6)
WBC # BLD AUTO: 5.03 K/UL (ref 3.8–10.5)

## 2023-06-01 PROCEDURE — 83735 ASSAY OF MAGNESIUM: CPT

## 2023-06-01 PROCEDURE — 84443 ASSAY THYROID STIM HORMONE: CPT

## 2023-06-01 PROCEDURE — 83036 HEMOGLOBIN GLYCOSYLATED A1C: CPT

## 2023-06-01 PROCEDURE — 80061 LIPID PANEL: CPT

## 2023-06-01 PROCEDURE — 85025 COMPLETE CBC W/AUTO DIFF WBC: CPT

## 2023-06-01 PROCEDURE — 36415 COLL VENOUS BLD VENIPUNCTURE: CPT

## 2023-06-01 PROCEDURE — 86376 MICROSOMAL ANTIBODY EACH: CPT

## 2023-06-01 PROCEDURE — 82550 ASSAY OF CK (CPK): CPT

## 2023-06-01 PROCEDURE — 80053 COMPREHEN METABOLIC PANEL: CPT

## 2023-06-01 PROCEDURE — 86038 ANTINUCLEAR ANTIBODIES: CPT

## 2023-06-01 PROCEDURE — 84439 ASSAY OF FREE THYROXINE: CPT

## 2023-06-02 ENCOUNTER — TELEPHONE (OUTPATIENT)
Dept: CARDIOLOGY | Facility: CLINIC | Age: 63
End: 2023-06-02
Payer: COMMERCIAL

## 2023-06-02 LAB — ANA SER QL IA: NEGATIVE

## 2023-06-05 ENCOUNTER — TELEPHONE (OUTPATIENT)
Dept: ENDOCRINOLOGY | Facility: CLINIC | Age: 63
End: 2023-06-05
Payer: COMMERCIAL

## 2023-06-05 DIAGNOSIS — F41.9 ANXIETY: ICD-10-CM

## 2023-06-05 DIAGNOSIS — F51.01 PRIMARY INSOMNIA: ICD-10-CM

## 2023-06-05 RX ORDER — ESZOPICLONE 3 MG/1
TABLET, FILM COATED ORAL
Qty: 30 TABLET | Refills: 0 | Status: SHIPPED | OUTPATIENT
Start: 2023-06-05 | End: 2023-07-05

## 2023-06-05 NOTE — TELEPHONE ENCOUNTER
Medicine Refill Request    Last Office: 11/18/2022   Last Consult Visit: Visit date not found  Last Telemedicine Visit: 7/1/2022 Leeann Townsend MD    Next Appointment: Visit date not found      Current Outpatient Medications:   •  albuterol HFA (VENTOLIN HFA) 90 mcg/actuation inhaler, as needed., Disp: , Rfl:   •  aspirin 81 mg enteric coated tablet, Take 81 mg by mouth daily., Disp: , Rfl:   •  atorvastatin (LIPITOR) 40 mg tablet, Take 1 tablet (40 mg total) by mouth nightly., Disp: 90 tablet, Rfl: 3  •  azelastine (ASTELIN) 137 mcg (0.1 %) nasal spray, as needed., Disp: , Rfl:   •  DULoxetine (CYMBALTA) 30 mg capsule, Take 3 capsules (90 mg total) by mouth once daily., Disp: 270 capsule, Rfl: 3  •  eszopiclone (LUNESTA) 3 mg tablet, take 1 tablet by mouth nightly, Disp: 30 tablet, Rfl: 0  •  LORazepam (ATIVAN) 0.5 mg tablet, Take 0.5 mg by mouth as needed., Disp: , Rfl:   •  magnesium oxide (MAG-OX) 400 mg (241.3 mg magnesium) tablet, Take 1 tablet (400 mg total) by mouth 2 (two) times a day., Disp: 180 tablet, Rfl: 3  •  metoprolol succinate XL (TOPROL-XL) 25 mg 24 hr tablet, Take 0.5 tablets (12.5 mg total) by mouth daily., Disp: 45 tablet, Rfl: 3  •  montelukast (SINGULAIR) 10 mg tablet, Take 1 tablet by mouth nightly., Disp: , Rfl:   •  psyllium (METAMUCIL SMOOTH TEXTURE) 3.4 gram packet, Take by mouth daily., Disp: , Rfl:   •  SYMBICORT 80-4.5 mcg/actuation inhaler, as needed., Disp: , Rfl:       BP Readings from Last 3 Encounters:   05/31/23 100/70   03/20/23 122/68   03/06/23 (!) 101/56       Recent Lab results:  Lab Results   Component Value Date    CHOL 156 06/01/2023   ,   Lab Results   Component Value Date    HDL 77 06/01/2023   ,   Lab Results   Component Value Date    LDLCALC 63 06/01/2023   ,   Lab Results   Component Value Date    TRIG 79 06/01/2023        Lab Results   Component Value Date    GLUCOSE 112 (H) 06/01/2023   ,   Lab Results   Component Value Date    HGBA1C 5.4 06/01/2023          Lab Results   Component Value Date    CREATININE 0.9 06/01/2023       Lab Results   Component Value Date    TSH 1.59 06/01/2023

## 2023-06-06 NOTE — TELEPHONE ENCOUNTER
I spoke with patient:  Her fasting is slightly elevated, A1C is normal  TPO antibodies are negative    Plan  - continue checking her sugars at least once a year  She will get them dome with her primary next  - continue eating healthy and regular exercise  - no indication for medication

## 2023-06-17 DIAGNOSIS — F41.9 ANXIETY: ICD-10-CM

## 2023-06-19 RX ORDER — DULOXETIN HYDROCHLORIDE 30 MG/1
90 CAPSULE, DELAYED RELEASE ORAL DAILY
Qty: 270 CAPSULE | Refills: 3 | Status: SHIPPED | OUTPATIENT
Start: 2023-06-19 | End: 2023-09-12 | Stop reason: SDUPTHER

## 2023-07-05 DIAGNOSIS — F51.01 PRIMARY INSOMNIA: ICD-10-CM

## 2023-07-05 DIAGNOSIS — F41.9 ANXIETY: ICD-10-CM

## 2023-07-05 RX ORDER — ESZOPICLONE 3 MG/1
TABLET, FILM COATED ORAL
Qty: 30 TABLET | Refills: 1 | Status: SHIPPED | OUTPATIENT
Start: 2023-07-05 | End: 2023-08-31

## 2023-07-05 NOTE — TELEPHONE ENCOUNTER
Medicine Refill Request    Last Office: 11/18/2022   Last Consult Visit: Visit date not found  Last Telemedicine Visit: 7/1/2022 Leeann Townsend MD    Next Appointment: Visit date not found      Current Outpatient Medications:   •  DULoxetine (CYMBALTA) 30 mg capsule, take 3 capsules by mouth once daily, Disp: 270 capsule, Rfl: 3  •  albuterol HFA (VENTOLIN HFA) 90 mcg/actuation inhaler, as needed., Disp: , Rfl:   •  aspirin 81 mg enteric coated tablet, Take 81 mg by mouth daily., Disp: , Rfl:   •  atorvastatin (LIPITOR) 40 mg tablet, Take 1 tablet (40 mg total) by mouth nightly., Disp: 90 tablet, Rfl: 3  •  azelastine (ASTELIN) 137 mcg (0.1 %) nasal spray, as needed., Disp: , Rfl:   •  eszopiclone (LUNESTA) 3 mg tablet, take 1 tablet by mouth nightly, Disp: 30 tablet, Rfl: 0  •  LORazepam (ATIVAN) 0.5 mg tablet, Take 0.5 mg by mouth as needed., Disp: , Rfl:   •  magnesium oxide (MAG-OX) 400 mg (241.3 mg magnesium) tablet, Take 1 tablet (400 mg total) by mouth 2 (two) times a day., Disp: 180 tablet, Rfl: 3  •  metoprolol succinate XL (TOPROL-XL) 25 mg 24 hr tablet, Take 0.5 tablets (12.5 mg total) by mouth daily., Disp: 45 tablet, Rfl: 3  •  montelukast (SINGULAIR) 10 mg tablet, Take 1 tablet by mouth nightly., Disp: , Rfl:   •  psyllium (METAMUCIL SMOOTH TEXTURE) 3.4 gram packet, Take by mouth daily., Disp: , Rfl:   •  SYMBICORT 80-4.5 mcg/actuation inhaler, as needed., Disp: , Rfl:       BP Readings from Last 3 Encounters:   05/31/23 100/70   03/20/23 122/68   03/06/23 (!) 101/56       Recent Lab results:  Lab Results   Component Value Date    CHOL 156 06/01/2023   ,   Lab Results   Component Value Date    HDL 77 06/01/2023   ,   Lab Results   Component Value Date    LDLCALC 63 06/01/2023   ,   Lab Results   Component Value Date    TRIG 79 06/01/2023        Lab Results   Component Value Date    GLUCOSE 112 (H) 06/01/2023   ,   Lab Results   Component Value Date    HGBA1C 5.4 06/01/2023         Lab Results    Component Value Date    CREATININE 0.9 06/01/2023       Lab Results   Component Value Date    TSH 1.59 06/01/2023

## 2023-07-10 ENCOUNTER — TELEPHONE (OUTPATIENT)
Dept: FAMILY MEDICINE | Facility: CLINIC | Age: 63
End: 2023-07-10
Payer: COMMERCIAL

## 2023-07-10 NOTE — TELEPHONE ENCOUNTER
Patient was taking wellbutrin but was taken off of it, she stated she wants to be put back on it since she stated she felt great while taking it and doesn't feel that way now. Doesn't have physical until sept

## 2023-07-11 NOTE — TELEPHONE ENCOUNTER
Pt called wanting to speak about a medication she was taking last year that she wants to continue taking again. Please Advise.

## 2023-07-12 ENCOUNTER — TELEMEDICINE (OUTPATIENT)
Dept: FAMILY MEDICINE | Facility: CLINIC | Age: 63
End: 2023-07-12
Payer: COMMERCIAL

## 2023-07-12 DIAGNOSIS — F41.9 ANXIETY: Primary | ICD-10-CM

## 2023-07-12 DIAGNOSIS — J45.909 ASTHMA, UNSPECIFIED ASTHMA SEVERITY, UNSPECIFIED WHETHER COMPLICATED, UNSPECIFIED WHETHER PERSISTENT: ICD-10-CM

## 2023-07-12 DIAGNOSIS — R32 URINARY INCONTINENCE, UNSPECIFIED TYPE: ICD-10-CM

## 2023-07-12 PROCEDURE — 99213 OFFICE O/P EST LOW 20 MIN: CPT | Mod: 95 | Performed by: NURSE PRACTITIONER

## 2023-07-12 RX ORDER — FLUOROURACIL 50 MG/G
CREAM TOPICAL
COMMUNITY
Start: 2023-06-05

## 2023-07-12 RX ORDER — CETIRIZINE HYDROCHLORIDE 10 MG/1
TABLET ORAL
COMMUNITY

## 2023-07-12 ASSESSMENT — ENCOUNTER SYMPTOMS
DYSURIA: 0
DIFFICULTY URINATING: 0
DIZZINESS: 0
NERVOUS/ANXIOUS: 1
LIGHT-HEADEDNESS: 0
PALPITATIONS: 0
HEADACHES: 0
SHORTNESS OF BREATH: 0
SLEEP DISTURBANCE: 1

## 2023-07-12 NOTE — PROGRESS NOTES
MISAEL Vega  [facility]  [address] [place] [address], [address]  [place] [address]  [phone]       Brian Dean is a 62 y.o. female who presents today for No chief complaint on file.      Allergies  Sulfa (sulfonamide antibiotics) and Sulfamethoxazole-trimethoprim      Current Outpatient Medications:   •  DULoxetine (CYMBALTA) 30 mg capsule, take 3 capsules by mouth once daily, Disp: 270 capsule, Rfl: 3  •  eszopiclone (LUNESTA) 3 mg tablet, take 1 tablet by mouth nightly, Disp: 30 tablet, Rfl: 1  •  albuterol HFA (VENTOLIN HFA) 90 mcg/actuation inhaler, as needed., Disp: , Rfl:   •  aspirin 81 mg enteric coated tablet, Take 81 mg by mouth daily., Disp: , Rfl:   •  atorvastatin (LIPITOR) 40 mg tablet, Take 1 tablet (40 mg total) by mouth nightly., Disp: 90 tablet, Rfl: 3  •  azelastine (ASTELIN) 137 mcg (0.1 %) nasal spray, as needed., Disp: , Rfl:   •  LORazepam (ATIVAN) 0.5 mg tablet, Take 0.5 mg by mouth as needed., Disp: , Rfl:   •  magnesium oxide (MAG-OX) 400 mg (241.3 mg magnesium) tablet, Take 1 tablet (400 mg total) by mouth 2 (two) times a day., Disp: 180 tablet, Rfl: 3  •  metoprolol succinate XL (TOPROL-XL) 25 mg 24 hr tablet, Take 0.5 tablets (12.5 mg total) by mouth daily., Disp: 45 tablet, Rfl: 3  •  montelukast (SINGULAIR) 10 mg tablet, Take 1 tablet by mouth nightly., Disp: , Rfl:   •  psyllium (METAMUCIL SMOOTH TEXTURE) 3.4 gram packet, Take by mouth daily., Disp: , Rfl:   •  SYMBICORT 80-4.5 mcg/actuation inhaler, as needed., Disp: , Rfl:     HPI      Review of Systems    Objective   There were no vitals filed for this visit.  There is no height or weight on file to calculate BMI.    Physical Exam    Assessment   {There are no diagnoses linked to this encounter. (Refresh or delete this SmartLink)}       MISAEL Boyd  7/12/2023

## 2023-07-12 NOTE — PROGRESS NOTES
Verification of Patient Location:  The patient affirms they are currently located in the following state: [place]    Request for Consent:    Audio and Video Encounter   Javon, my name is MISAEL Boyd.  Before we proceed, can you please verify your identification by telling me your full name and date of birth?  Can you tell me who is in the room with you?    You and I are about to have a telemedicine check-in or visit because you have requested it.  This is a live video-conference.  I am a real person, speaking to you in real time.  There is no one else with me on the video-conference. I am not recording this conversation and I am asking you not to record it.  This telemedicine visit will be billed to your health insurance or you, if you are self-insured.  You understand you will be responsible for any copayments or coinsurances that apply to your telemedicine visit.  Communication platform used for this encounter:  SonoPlot Video Visit (Epic Video Client)       Before starting our telemedicine visit, I am required to get your consent for this virtual check-in or visit by telemedicine. Do you consent?      Patient Response to Request for Consent:  Yes      Visit Documentation:  Subjective     Patient ID: Brian Dean is a 62 y.o. female.  1960      HPI    1) Pt would like to resume Wellbutrin   - For 10 years, had been taking Wellbutrin xl 300mg daily with Cymbalta 90 mg (30mg x 3) and doing well  - Last year Dr Ch wanted her to wean off Wellbutrin (6/2022)   - Lunesta 3 mg nighly regurlary for 13 years    - A few months later started to notice mood changes   - Picking skin and nail biting has started - these are new and she did not do thi  - Increased worry and complaining, mood less stable  - Some increase in anxeity    2) incontinent 2 times:  - Once during the night when sleeping - about 2 months ago  - Recently when walking  - States she is scheduled to see provider for this  that her friend goes to - unsure if - - - - Urologist or Urogyn    The following have been reviewed and updated as appropriate in this visit:   Tobacco  Allergies  Meds  Problems  Med Hx  Surg Hx  Fam Hx       Review of Systems   Respiratory: Negative for shortness of breath.    Cardiovascular: Negative for chest pain and palpitations.   Genitourinary: Negative for difficulty urinating and dysuria.   Neurological: Negative for dizziness, light-headedness and headaches.   Psychiatric/Behavioral: Positive for sleep disturbance (waking up in middle of night for frquently the past nights, ). The patient is nervous/anxious.      Objective:  A&O x3  No acute distress  Speech Clear   Does not appear anxious and is not tangential.      Assessment/Plan   Diagnoses and all orders for this visit:    Anxiety (Primary)    Urinary incontinence, unspecified type  -     Urine culture; Future  -     Urinalysis (clean catch); Future      I cannot advise resuming Wellbutrin without knowing why Dr Ch advised wean off. I am not seeing clearly in pt's chart regarding this. I will further review pt's chart off of the video visit and will follow up with the pt.    R/O UTI

## 2023-07-17 ENCOUNTER — TELEPHONE (OUTPATIENT)
Dept: SCHEDULING | Facility: CLINIC | Age: 63
End: 2023-07-17
Payer: COMMERCIAL

## 2023-07-17 RX ORDER — BUPROPION HYDROCHLORIDE 150 MG/1
150 TABLET ORAL DAILY
Qty: 90 TABLET | Refills: 0 | Status: SHIPPED | OUTPATIENT
Start: 2023-07-17 | End: 2023-11-14 | Stop reason: SDUPTHER

## 2023-07-17 NOTE — TELEPHONE ENCOUNTER
Rafael pt - Last OV 3/20/23    Hx:  CAD nstemi MCGUIRE S/p Cabgx1 pericarditis CKD fibromuscular dysplasia    Pt calling to ask Dr. Hall if okay to resume Wellbutrin.  Pt was on it a while ago but was taken off by PCP.  Pt saw PCP and they would like pt to go back on it but would like to know if cardiology okay with pt resuming Wellbutrin.    Pt can be reached at [phone]

## 2023-07-17 NOTE — TELEPHONE ENCOUNTER
Spoke with pt.   She would like to restart Wellbutrin 150mg daily.  I will put in prescription.  She is scheduled with Dr Ch. Dose can be revisited at that time.  Pharmacy confirmed

## 2023-07-17 NOTE — TELEPHONE ENCOUNTER
I just reviewed in detail as well. Looks as though we decided to wean off 6/6/22, but I didn't articulate why in the note. Based on memory, I was concerned about the high dose (with Cymbalta) and we thought it was too activating for her. Okay to restart - either at 75 mg or 150 mg. I can reassess with her 9/12/23 at her next appointment.

## 2023-07-17 NOTE — TELEPHONE ENCOUNTER
No contraindication with current medications.  Will d/w Dr. Hall and then let pt know.  I d/w Dr. Hall and estrella with taking Wellbutrin and I called and notified pt.

## 2023-08-23 NOTE — ANESTHESIA PROCEDURE NOTES
Arterial Line:      An arterial line was placed in the OR for the following indication(s): continuous blood pressure monitoring and blood sampling needed.    A 20 G (size), 1 and 3/4 inch (length), Angiocath (type) catheter was placed,   Seldinger technique used, into the Right radial artery, secured by Tegaderm.      Procedure performed using ultrasound guidance and surface landmarks.  Image not captured or stored.      Events:  patient tolerated procedure well with no complications.    The supervising anesthesiologist was   Performed By: anesthesiologist  Attending: Benita Okeefe MD               Picato Counseling:  I discussed with the patient the risks of Picato including but not limited to erythema, scaling, itching, weeping, crusting, and pain.

## 2023-08-31 DIAGNOSIS — F51.01 PRIMARY INSOMNIA: ICD-10-CM

## 2023-08-31 DIAGNOSIS — F41.9 ANXIETY: ICD-10-CM

## 2023-08-31 RX ORDER — ESZOPICLONE 3 MG/1
TABLET, FILM COATED ORAL
Qty: 30 TABLET | Refills: 3 | Status: SHIPPED | OUTPATIENT
Start: 2023-08-31 | End: 2024-01-02 | Stop reason: SDUPTHER

## 2023-09-11 PROBLEM — Z00.00 ENCOUNTER FOR GENERAL MEDICAL EXAMINATION: Status: ACTIVE | Noted: 2023-09-11

## 2023-09-11 PROBLEM — Z86.79 HISTORY OF PERICARDITIS: Status: RESOLVED | Noted: 2021-10-29 | Resolved: 2023-09-11

## 2023-09-12 ENCOUNTER — OFFICE VISIT (OUTPATIENT)
Dept: FAMILY MEDICINE | Facility: CLINIC | Age: 63
End: 2023-09-12
Payer: COMMERCIAL

## 2023-09-12 VITALS
SYSTOLIC BLOOD PRESSURE: 112 MMHG | OXYGEN SATURATION: 99 % | HEIGHT: 65 IN | RESPIRATION RATE: 14 BRPM | TEMPERATURE: 97.5 F | BODY MASS INDEX: 22.79 KG/M2 | HEART RATE: 69 BPM | DIASTOLIC BLOOD PRESSURE: 68 MMHG | WEIGHT: 136.8 LBS

## 2023-09-12 DIAGNOSIS — J45.909 ASTHMA, UNSPECIFIED ASTHMA SEVERITY, UNSPECIFIED WHETHER COMPLICATED, UNSPECIFIED WHETHER PERSISTENT: ICD-10-CM

## 2023-09-12 DIAGNOSIS — Z23 NEED FOR VACCINATION: ICD-10-CM

## 2023-09-12 DIAGNOSIS — F43.21 GRIEF: ICD-10-CM

## 2023-09-12 DIAGNOSIS — D63.8 ANEMIA OF CHRONIC DISEASE: ICD-10-CM

## 2023-09-12 DIAGNOSIS — F41.9 ANXIETY: ICD-10-CM

## 2023-09-12 DIAGNOSIS — Z11.59 ENCOUNTER FOR HCV SCREENING TEST FOR LOW RISK PATIENT: ICD-10-CM

## 2023-09-12 DIAGNOSIS — Z00.00 ENCOUNTER FOR GENERAL MEDICAL EXAMINATION: Primary | ICD-10-CM

## 2023-09-12 DIAGNOSIS — E78.5 DYSLIPIDEMIA: ICD-10-CM

## 2023-09-12 DIAGNOSIS — R73.01 IMPAIRED FASTING GLUCOSE: ICD-10-CM

## 2023-09-12 DIAGNOSIS — I77.3 FIBROMUSCULAR DYSPLASIA (CMS/HCC): ICD-10-CM

## 2023-09-12 PROBLEM — I21.4 NSTEMI (NON-ST ELEVATED MYOCARDIAL INFARCTION) (CMS/HCC): Status: RESOLVED | Noted: 2021-07-09 | Resolved: 2023-09-12

## 2023-09-12 PROBLEM — R10.84 GENERALIZED ABDOMINAL PAIN: Status: RESOLVED | Noted: 2022-11-27 | Resolved: 2023-09-12

## 2023-09-12 PROCEDURE — 3008F BODY MASS INDEX DOCD: CPT | Performed by: SURGERY

## 2023-09-12 PROCEDURE — 90677 PCV20 VACCINE IM: CPT | Performed by: SURGERY

## 2023-09-12 PROCEDURE — 99397 PER PM REEVAL EST PAT 65+ YR: CPT | Mod: 25 | Performed by: SURGERY

## 2023-09-12 PROCEDURE — 90471 IMMUNIZATION ADMIN: CPT | Performed by: SURGERY

## 2023-09-12 PROCEDURE — 90472 IMMUNIZATION ADMIN EACH ADD: CPT | Performed by: SURGERY

## 2023-09-12 PROCEDURE — 90715 TDAP VACCINE 7 YRS/> IM: CPT | Performed by: SURGERY

## 2023-09-12 RX ORDER — DULOXETIN HYDROCHLORIDE 30 MG/1
90 CAPSULE, DELAYED RELEASE ORAL DAILY
Qty: 270 CAPSULE | Refills: 3 | Status: SHIPPED | OUTPATIENT
Start: 2023-09-12 | End: 2024-02-20 | Stop reason: SDUPTHER

## 2023-09-12 ASSESSMENT — ENCOUNTER SYMPTOMS
ABDOMINAL DISTENTION: 0
BACK PAIN: 0
BLOOD IN STOOL: 0
WEAKNESS: 0
DIZZINESS: 0
WOUND: 0
PALPITATIONS: 0
APPETITE CHANGE: 0
VOICE CHANGE: 0
JOINT SWELLING: 0
ARTHRALGIAS: 0
DYSPHORIC MOOD: 0
UNEXPECTED WEIGHT CHANGE: 0
SHORTNESS OF BREATH: 0
CHEST TIGHTNESS: 0
NUMBNESS: 0
MYALGIAS: 0
DIFFICULTY URINATING: 0
ACTIVITY CHANGE: 0
FATIGUE: 0
LIGHT-HEADEDNESS: 0
CONSTIPATION: 0
NERVOUS/ANXIOUS: 0
FEVER: 0
VOMITING: 0
NAUSEA: 0
DIARRHEA: 0
HEADACHES: 0
APNEA: 0
SLEEP DISTURBANCE: 0
TROUBLE SWALLOWING: 0
ABDOMINAL PAIN: 0
COUGH: 0
WHEEZING: 0
HEMATURIA: 0

## 2023-09-12 ASSESSMENT — PATIENT HEALTH QUESTIONNAIRE - PHQ9: SUM OF ALL RESPONSES TO PHQ9 QUESTIONS 1 & 2: 1

## 2023-09-12 NOTE — ASSESSMENT & PLAN NOTE
Demarcus. She follows with her specialists: Dr. Hall, Dr. Ricardo Otero in [place], and Dr. Leonard in [place].

## 2023-09-12 NOTE — ASSESSMENT & PLAN NOTE
Loss of a close friend. Encouraged her to consider meeting with a therapist, and discussed our psychologists in-office.

## 2023-09-12 NOTE — ASSESSMENT & PLAN NOTE
Health maintenance measures updated today.  -Covid-19 vaccine series:Booster advised  -Influenza vaccine: Advised  -Tdap:Due, last 6/2013. Given today  -Pneumococcal: Given today  -Pap smears: UTD with Gyn  -Mammogram: Ordered by Gyn  -DEXA: Ordered by Gyn  -Colonoscopy: 2/22/23 - one polyp, diverticula, repeat in 10 years  -Depression screening performed, negative  -Counseled on regular aerobic exercise amounting to a minimum of 150 minutes most days per week and a healthful, whole foods-based diet such as the Mediterranean diet for cardiovascular risk reduction and overall health.

## 2023-09-12 NOTE — PROGRESS NOTES
ESTABLISHED PATIENT PHYSICAL EXAM    Leeann Ch M.D.  [facility] in [address]  [address]  [place] [address]  [phone]      HISTORY OF PRESENT ILLNESS      CC: annual physical exam    HPI:  Brian Dean is a 63 y.o. female who presents for an annual physical exam.    She had diverticulitis in March, shortly after her colonoscopy in February. Treated with outpatient antibiotics.    Has been seeing her specialists.    Very upset as one of her best friends  last night from pancreatic cancer.    Specialists:  Dr. Hall (Cardiology)- MCGUIRE, CAD (CABG 21)   Dr. Ricardo Otero (Cardiology, [place], [place])- FMD confirmed 3/9/22  Dr. Leonard (Neurointerventionist, [place])- ICA aneurysm  Dr. Nielson (Allergist)  Dr. Patterson (Gastro)  Dr. Franks (Dermatology)  Dr. Shipman- thyroid nodules  Dr. Crane- thrombocytosis, reactive (following heart surgery)     Dentist: goes twice yearly  Eye Doctor: goes yearly    Diet: Eating really well.   Exercise: Regular, very active    PAST MEDICAL AND SURGICAL HISTORY        Past Medical History:   Diagnosis Date    Allergies     Anxiety     Asthma     Coronary artery disease     Coronary artery dissection     Diverticulitis of colon     Heart disease     History of pericarditis 10/29/2021    Patient was seen in the ED on 21 for complaints of chest pain.  CT of the chest was negative for PE and showed just trace pericardial effusion. Troponin level negative x1. She had resumed her NSAID and colchicine. She was treated for presumed pericarditis. She took ibuprofen for a few days with resolution of the symptom    Lipid disorder     NSTEMI (non-ST elevated myocardial infarction) (CMS/[facility])     Parvovirus B19 infection        Past Surgical History:   Procedure Laterality Date    CORONARY ARTERY BYPASS GRAFT  2021    robotic - single bypass    REDUCTION MAMMAPLASTY      REDUCTION MAMMAPLASTY       MEDICATIONS          Current  Outpatient Medications:     aspirin 81 mg enteric coated tablet, Take 81 mg by mouth daily., Disp: , Rfl:     atorvastatin (LIPITOR) 40 mg tablet, Take 1 tablet (40 mg total) by mouth nightly., Disp: 90 tablet, Rfl: 3    buPROPion XL (WELLBUTRIN XL) 150 mg 24 hr tablet, Take 1 tablet (150 mg total) by mouth daily., Disp: 90 tablet, Rfl: 0    cetirizine (ZyrTEC) 10 mg tablet, daily., Disp: , Rfl:     DULoxetine (CYMBALTA) 30 mg capsule, Take 3 capsules (90 mg total) by mouth daily., Disp: 270 capsule, Rfl: 3    eszopiclone (LUNESTA) 3 mg tablet, take 1 tablet by mouth nightly, Disp: 30 tablet, Rfl: 3    magnesium oxide (MAG-OX) 400 mg (241.3 mg magnesium) tablet, Take 1 tablet (400 mg total) by mouth 2 (two) times a day., Disp: 180 tablet, Rfl: 3    metoprolol succinate XL (TOPROL-XL) 25 mg 24 hr tablet, Take 0.5 tablets (12.5 mg total) by mouth daily., Disp: 45 tablet, Rfl: 3    montelukast (SINGULAIR) 10 mg tablet, Take 1 tablet by mouth nightly., Disp: , Rfl:     psyllium (METAMUCIL SMOOTH TEXTURE) 3.4 gram packet, Take by mouth daily., Disp: , Rfl:     albuterol HFA (VENTOLIN HFA) 90 mcg/actuation inhaler, as needed., Disp: , Rfl:     azelastine (ASTELIN) 137 mcg (0.1 %) nasal spray, as needed., Disp: , Rfl:     fluorouraciL (EFUDEX) 5 % cream, , Disp: , Rfl:     SYMBICORT 80-4.5 mcg/actuation inhaler, as needed., Disp: , Rfl:   ALLERGIES        Sulfa (sulfonamide antibiotics) and Sulfamethoxazole-trimethoprim  FAMILY HISTORY        Family History   Problem Relation Age of Onset    Heart disease Biological Mother     Hypertension Biological Mother     Atrial fibrillation Biological Mother     Diabetes Biological Mother     Heart disease Biological Father     Heart disease Biological Brother      SOCIAL/ TOBACCO HISTORY        Social History     Tobacco Use    Smoking status: Never    Smokeless tobacco: Never   Vaping Use    Vaping Use: Never used   Substance Use Topics    Alcohol use: Yes  "    Comment: on occasion    Drug use: Yes     Types: Marijuana     REVIEW OF SYSTEMS        Review of Systems   Constitutional: Negative for activity change, appetite change, fatigue, fever and unexpected weight change.   HENT: Negative for dental problem, ear discharge, ear pain, hearing loss, tinnitus, trouble swallowing and voice change.    Eyes: Negative for visual disturbance.   Respiratory: Negative for apnea, cough, chest tightness, shortness of breath and wheezing.    Cardiovascular: Negative for chest pain, palpitations and leg swelling.   Gastrointestinal: Negative for abdominal distention, abdominal pain, blood in stool, constipation, diarrhea, nausea and vomiting.   Endocrine: Negative for cold intolerance and heat intolerance.   Genitourinary: Negative for difficulty urinating and hematuria.   Musculoskeletal: Negative for arthralgias, back pain, joint swelling and myalgias.   Skin: Negative for rash and wound.   Allergic/Immunologic: Negative for immunocompromised state.   Neurological: Negative for dizziness, weakness, light-headedness, numbness and headaches.   Psychiatric/Behavioral: Negative for dysphoric mood and sleep disturbance. The patient is not nervous/anxious.       PHYSICAL EXAMINATION      Vitals:    09/12/23 1125   BP: 112/68   Pulse: 69   Resp: 14   Temp: 36.4 °C (97.5 °F)   TempSrc: Temporal   SpO2: 99%   Weight: 62.1 kg (136 lb 12.8 oz)   Height: 1.638 m (5' 4.5\")      Body mass index is 23.12 kg/m².    Physical Exam  Vitals and nursing note reviewed.   Constitutional:       General: She is not in acute distress.     Appearance: Normal appearance.   HENT:      Head: Normocephalic and atraumatic.      Right Ear: Tympanic membrane, ear canal and external ear normal.      Left Ear: Tympanic membrane, ear canal and external ear normal.      Nose: Nose normal.      Mouth/Throat:      Mouth: Mucous membranes are moist.      Pharynx: Oropharynx is clear. No oropharyngeal exudate or " posterior oropharyngeal erythema.   Eyes:      General: No scleral icterus.     Extraocular Movements: Extraocular movements intact.      Conjunctiva/sclera: Conjunctivae normal.      Pupils: Pupils are equal, round, and reactive to light.   Neck:      Thyroid: No thyroid mass, thyromegaly or thyroid tenderness.      Trachea: Trachea and phonation normal.   Cardiovascular:      Rate and Rhythm: Normal rate and regular rhythm.      Pulses: Normal pulses.      Heart sounds: Normal heart sounds. No murmur heard.  Pulmonary:      Effort: Pulmonary effort is normal. No respiratory distress.      Breath sounds: Normal breath sounds. No wheezing, rhonchi or rales.   Abdominal:      General: Abdomen is flat. There is no distension.      Palpations: Abdomen is soft. There is no mass.      Tenderness: There is no abdominal tenderness. There is no right CVA tenderness, left CVA tenderness, guarding or rebound.   Musculoskeletal:         General: No swelling or tenderness. Normal range of motion.      Cervical back: Normal range of motion and neck supple. No tenderness.      Right lower leg: No edema.      Left lower leg: No edema.   Lymphadenopathy:      Cervical: No cervical adenopathy.   Skin:     General: Skin is warm.      Capillary Refill: Capillary refill takes less than 2 seconds.      Findings: No lesion or rash.   Neurological:      General: No focal deficit present.      Mental Status: She is alert and oriented to person, place, and time. Mental status is at baseline.      Cranial Nerves: No cranial nerve deficit.      Sensory: No sensory deficit.      Coordination: Coordination normal.      Gait: Gait normal.      Deep Tendon Reflexes: Reflexes normal.   Psychiatric:         Mood and Affect: Mood normal.         Behavior: Behavior normal.         Thought Content: Thought content normal.         Judgment: Judgment normal.       PRIOR LABS        Lab Results   Component Value Date    HGBA1C 5.4 06/01/2023     No  results found for: MICROALBUR  No results found for: ALBCRET  Lab Results   Component Value Date    CHOL 156 06/01/2023    CHOL 141 05/26/2022    CHOL 150 07/14/2021     Lab Results   Component Value Date    HDL 77 06/01/2023    HDL 79 05/26/2022    HDL 77 07/14/2021     Lab Results   Component Value Date    LDLCALC 63 06/01/2023    LDLCALC 48 05/26/2022    LDLCALC 58 07/14/2021     Lab Results   Component Value Date    TRIG 79 06/01/2023    TRIG 68 05/26/2022    TRIG 73 07/14/2021     No results found for: CHOLHDL  Lab Results   Component Value Date    TSH 1.59 06/01/2023     Lab Results   Component Value Date    WBC 5.03 06/01/2023    HGB 12.4 06/01/2023    HCT 37.7 06/01/2023    MCV 92.4 06/01/2023     06/01/2023     Lab Results   Component Value Date     (L) 06/01/2023    K 5.2 (H) 06/01/2023     06/01/2023    CO2 28 06/01/2023    BUN 11 06/01/2023    CREATININE 0.9 06/01/2023    GLUCOSE 112 (H) 06/01/2023    AST 31 06/01/2023    ALT 25 06/01/2023    PROTEIN 6.5 06/01/2023    ALBUMIN 3.8 06/01/2023    BILITOT 0.8 06/01/2023    EGFR >60.0 06/01/2023    ANIONGAP 6 06/01/2023       ASSESSMENT AND PLAN   Assessment   Problem List Items Addressed This Visit        Respiratory    Asthma     Managed by her Allergist. Well-controlled.            Circulatory    Fibromuscular dysplasia (CMS/HCC)     Stable. She follows with her specialists: Dr. Hall, Dr. Ricardo [place]tero in [place], and Dr. Leonard in [place].            Hematologic    Anemia of chronic disease    Relevant Orders    CBC and Differential       Mental Health    Anxiety     Patient weaned off Wellbutrin last summer as she wanted fewer medications.    She restarted it this summer and this has been helpful.  Continues on Cymbalta 90 mg daily.  Lunesta nightly for sleep.          Relevant Medications    DULoxetine (CYMBALTA) 30 mg capsule    Grief     Loss of a close friend. Encouraged her to consider meeting with a therapist, and discussed our psychologists  in-office.            Other    Dyslipidemia     Continue atorvastatin 40 mg.          Encounter for general medical examination - Primary     Health maintenance measures updated today.  -Covid-19 vaccine series:Booster advised  -Influenza vaccine: Advised  -Tdap:Due, last 6/2013. Given today  -Pneumococcal: Given today  -Pap smears: UTD with Gyn  -Mammogram: Ordered by Gyn  -DEXA: Ordered by Gyn  -Colonoscopy: 2/22/23 - one polyp, diverticula, repeat in 10 years  -Depression screening performed, negative  -Counseled on regular aerobic exercise amounting to a minimum of 150 minutes most days per week and a healthful, whole foods-based diet such as the Mediterranean diet for cardiovascular risk reduction and overall health.          Other Visit Diagnoses     Impaired fasting glucose        Relevant Orders    Comprehensive metabolic panel    Hemoglobin A1c    Encounter for HCV screening test for low risk patient        Relevant Orders    Hepatitis C antibody               Leeann Ch MD  9/12/2023

## 2023-09-12 NOTE — ASSESSMENT & PLAN NOTE
Patient weaned off Wellbutrin last summer as she wanted fewer medications.    She restarted it this summer and this has been helpful.  Continues on Cymbalta 90 mg daily.  Lunesta nightly for sleep.

## 2023-10-09 RX ORDER — METOPROLOL SUCCINATE 25 MG/1
12.5 TABLET, EXTENDED RELEASE ORAL DAILY
Qty: 45 TABLET | Refills: 3 | Status: SHIPPED | OUTPATIENT
Start: 2023-10-09 | End: 2024-02-08 | Stop reason: SDUPTHER

## 2023-11-14 RX ORDER — BUPROPION HYDROCHLORIDE 150 MG/1
150 TABLET ORAL DAILY
Qty: 90 TABLET | Refills: 0 | Status: SHIPPED | OUTPATIENT
Start: 2023-11-14 | End: 2024-02-08

## 2023-11-14 NOTE — TELEPHONE ENCOUNTER
Medicine Refill Request    Last Office Visit: 9/12/2023   Last Consult Visit: Visit date not found  Last Telemedicine Visit: 7/12/2023 Joy Kerr CRNP    Next Appointment: 9/16/2024      Current Outpatient Medications:     albuterol HFA (VENTOLIN HFA) 90 mcg/actuation inhaler, as needed., Disp: , Rfl:     aspirin 81 mg enteric coated tablet, Take 81 mg by mouth daily., Disp: , Rfl:     atorvastatin (LIPITOR) 40 mg tablet, take 1 tablet by mouth nightly, Disp: 90 tablet, Rfl: 3    azelastine (ASTELIN) 137 mcg (0.1 %) nasal spray, as needed., Disp: , Rfl:     buPROPion XL (WELLBUTRIN XL) 150 mg 24 hr tablet, Take 1 tablet (150 mg total) by mouth daily., Disp: 90 tablet, Rfl: 0    cetirizine (ZyrTEC) 10 mg tablet, daily., Disp: , Rfl:     DULoxetine (CYMBALTA) 30 mg capsule, Take 3 capsules (90 mg total) by mouth daily., Disp: 270 capsule, Rfl: 3    eszopiclone (LUNESTA) 3 mg tablet, take 1 tablet by mouth nightly, Disp: 30 tablet, Rfl: 3    fluorouraciL (EFUDEX) 5 % cream, , Disp: , Rfl:     magnesium oxide (MAG-OX) 400 mg (241.3 mg magnesium) tablet, Take 1 tablet (400 mg total) by mouth 2 (two) times a day., Disp: 180 tablet, Rfl: 3    metoprolol succinate XL (TOPROL-XL) 25 mg 24 hr tablet, take 1/2 tablet by mouth once daily, Disp: 45 tablet, Rfl: 3    montelukast (SINGULAIR) 10 mg tablet, Take 1 tablet by mouth nightly., Disp: , Rfl:     psyllium (METAMUCIL SMOOTH TEXTURE) 3.4 gram packet, Take by mouth daily., Disp: , Rfl:     SYMBICORT 80-4.5 mcg/actuation inhaler, as needed., Disp: , Rfl:       BP Readings from Last 3 Encounters:   09/12/23 112/68   05/31/23 100/70   03/20/23 122/68       Recent Lab results:  Lab Results   Component Value Date    CHOL 156 06/01/2023   ,   Lab Results   Component Value Date    HDL 77 06/01/2023   ,   Lab Results   Component Value Date    LDLCALC 63 06/01/2023   ,   Lab Results   Component Value Date    TRIG 79 06/01/2023        Lab Results    Component Value Date    GLUCOSE 112 (H) 06/01/2023   ,   Lab Results   Component Value Date    HGBA1C 5.4 06/01/2023         Lab Results   Component Value Date    CREATININE 0.9 06/01/2023       Lab Results   Component Value Date    TSH 1.59 06/01/2023           Lab Results   Component Value Date    HGBA1C 5.4 06/01/2023

## 2023-11-22 ENCOUNTER — APPOINTMENT (OUTPATIENT)
Dept: LAB | Facility: HOSPITAL | Age: 63
End: 2023-11-22
Attending: INTERNAL MEDICINE
Payer: COMMERCIAL

## 2023-11-22 ENCOUNTER — TRANSCRIBE ORDERS (OUTPATIENT)
Dept: SCHEDULING | Age: 63
End: 2023-11-22

## 2023-11-22 DIAGNOSIS — D64.9 ANEMIA, UNSPECIFIED: Primary | ICD-10-CM

## 2023-11-22 DIAGNOSIS — D75.839 THROMBOCYTOSIS, UNSPECIFIED: ICD-10-CM

## 2023-11-22 DIAGNOSIS — D64.9 ANEMIA, UNSPECIFIED: ICD-10-CM

## 2023-11-22 DIAGNOSIS — R91.1 SOLITARY PULMONARY NODULE: ICD-10-CM

## 2023-11-22 LAB
BASOPHILS # BLD: 0.07 K/UL (ref 0.01–0.1)
BASOPHILS NFR BLD: 1 %
DIFFERENTIAL METHOD BLD: NORMAL
EOSINOPHIL # BLD: 0.14 K/UL (ref 0.04–0.36)
EOSINOPHIL NFR BLD: 2 %
ERYTHROCYTE [DISTWIDTH] IN BLOOD BY AUTOMATED COUNT: 13.3 % (ref 11.7–14.4)
HCT VFR BLDCO AUTO: 37.2 % (ref 35–45)
HGB BLD-MCNC: 12.5 G/DL (ref 11.8–15.7)
IMM GRANULOCYTES # BLD AUTO: 0.01 K/UL (ref 0–0.08)
IMM GRANULOCYTES NFR BLD AUTO: 0.1 %
LYMPHOCYTES # BLD: 2.46 K/UL (ref 1.2–3.5)
LYMPHOCYTES NFR BLD: 34.4 %
MCH RBC QN AUTO: 30.1 PG (ref 28–33.2)
MCHC RBC AUTO-ENTMCNC: 33.6 G/DL (ref 32.2–35.5)
MCV RBC AUTO: 89.6 FL (ref 83–98)
MONOCYTES # BLD: 0.4 K/UL (ref 0.28–0.8)
MONOCYTES NFR BLD: 5.6 %
NEUTROPHILS # BLD: 4.07 K/UL (ref 1.7–7)
NEUTROPHILS # BLD: 4.07 K/UL (ref 1.7–7)
NEUTS SEG NFR BLD: 56.9 %
NRBC BLD-RTO: 0 %
PDW BLD AUTO: 9.9 FL (ref 9.4–12.3)
PLATELET # BLD AUTO: 333 K/UL (ref 150–369)
RBC # BLD AUTO: 4.15 M/UL (ref 3.93–5.22)
WBC # BLD AUTO: 7.15 K/UL (ref 3.8–10.5)

## 2023-11-22 PROCEDURE — 85025 COMPLETE CBC W/AUTO DIFF WBC: CPT

## 2023-11-22 PROCEDURE — 36415 COLL VENOUS BLD VENIPUNCTURE: CPT

## 2024-01-02 DIAGNOSIS — F41.9 ANXIETY: ICD-10-CM

## 2024-01-02 DIAGNOSIS — F51.01 PRIMARY INSOMNIA: ICD-10-CM

## 2024-01-02 RX ORDER — ESZOPICLONE 3 MG/1
3 TABLET, FILM COATED ORAL NIGHTLY
Qty: 30 TABLET | Refills: 3 | Status: SHIPPED | OUTPATIENT
Start: 2024-01-02 | End: 2024-05-06

## 2024-01-02 NOTE — TELEPHONE ENCOUNTER
Pt called stating she needs her medication Lunesta sent to the new pharmacy on file [facility]. Pt old pharmacy closed after 40 years and pt is out of medication. Please advise.

## 2024-01-12 ENCOUNTER — TELEPHONE (OUTPATIENT)
Dept: SCHEDULING | Facility: CLINIC | Age: 64
End: 2024-01-12
Payer: COMMERCIAL

## 2024-01-12 RX ORDER — LANOLIN ALCOHOL/MO/W.PET/CERES
400 CREAM (GRAM) TOPICAL 2 TIMES DAILY
Qty: 180 TABLET | Refills: 3 | Status: SHIPPED | OUTPATIENT
Start: 2024-01-12 | End: 2025-01-11

## 2024-01-12 NOTE — TELEPHONE ENCOUNTER
Called patient home no answer. Called wife no answer. Patient needs to reschedule appt. Lm on wifes vm   Out of meds Medicine Refill Request    Medicine Refill Insert    Name of Patient: Brian Dean    Caller's name/Relationship: self     Callback number: [phone]    Medication Name, Dosage, Supply: magnesium oxide (MAG-OX) 400 mg (241.3 mg magnesium) tablet 90#    Quantity left: out of meds     Pharmacy[facility]  Last Office Visit: Visit date not found   Last Consult Visit: Visit date not found  Last Telemedicine Visit: Visit date not found    Next Appointment: Visit date not found      Current Outpatient Medications:   •  magnesium oxide (MAG-OX) 400 mg (241.3 mg magnesium) tablet, Take 1 tablet (400 mg total) by mouth 2 (two) times a day., Disp: 180 tablet, Rfl: 3  •  albuterol HFA (VENTOLIN HFA) 90 mcg/actuation inhaler, as needed., Disp: , Rfl:   •  aspirin 81 mg enteric coated tablet, Take 81 mg by mouth daily., Disp: , Rfl:   •  atorvastatin (LIPITOR) 40 mg tablet, take 1 tablet by mouth nightly, Disp: 90 tablet, Rfl: 3  •  azelastine (ASTELIN) 137 mcg (0.1 %) nasal spray, as needed., Disp: , Rfl:   •  buPROPion XL (WELLBUTRIN XL) 150 mg 24 hr tablet, Take 1 tablet (150 mg total) by mouth daily., Disp: 90 tablet, Rfl: 0  •  cetirizine (ZyrTEC) 10 mg tablet, daily., Disp: , Rfl:   •  DULoxetine (CYMBALTA) 30 mg capsule, Take 3 capsules (90 mg total) by mouth daily., Disp: 270 capsule, Rfl: 3  •  eszopiclone (LUNESTA) 3 mg tablet, Take 1 tablet (3 mg total) by mouth nightly. Take immediately before bedtime., Disp: 30 tablet, Rfl: 3  •  fluorouraciL (EFUDEX) 5 % cream, , Disp: , Rfl:   •  metoprolol succinate XL (TOPROL-XL) 25 mg 24 hr tablet, take 1/2 tablet by mouth once daily, Disp: 45 tablet, Rfl: 3  •  montelukast (SINGULAIR) 10 mg tablet, Take 1 tablet by mouth nightly., Disp: , Rfl:   •  psyllium (METAMUCIL SMOOTH TEXTURE) 3.4 gram packet, Take by mouth daily., Disp: , Rfl:   •  SYMBICORT 80-4.5 mcg/actuation inhaler, as needed., Disp: , Rfl:       BP Readings from Last 3 Encounters:   09/12/23 112/68    05/31/23 100/70   03/20/23 122/68       Recent Lab results:  Lab Results   Component Value Date    CHOL 156 06/01/2023   ,   Lab Results   Component Value Date    HDL 77 06/01/2023   ,   Lab Results   Component Value Date    LDLCALC 63 06/01/2023   ,   Lab Results   Component Value Date    TRIG 79 06/01/2023        Lab Results   Component Value Date    GLUCOSE 112 (H) 06/01/2023   ,   Lab Results   Component Value Date    HGBA1C 5.4 06/01/2023         Lab Results   Component Value Date    CREATININE 0.9 06/01/2023       Lab Results   Component Value Date    TSH 1.59 06/01/2023           Lab Results   Component Value Date    HGBA1C 5.4 06/01/2023

## 2024-01-30 ENCOUNTER — TELEPHONE (OUTPATIENT)
Dept: FAMILY MEDICINE | Facility: CLINIC | Age: 64
End: 2024-01-30
Payer: COMMERCIAL

## 2024-01-30 NOTE — TELEPHONE ENCOUNTER
Informed Pt that she must call [facility] Trivedi to obtain her results and upload them into My Chart for  to be able to view

## 2024-02-01 ENCOUNTER — TELEPHONE (OUTPATIENT)
Dept: FAMILY MEDICINE | Facility: CLINIC | Age: 64
End: 2024-02-01
Payer: COMMERCIAL

## 2024-02-01 NOTE — TELEPHONE ENCOUNTER
Pt called leaving a message stating that she wants a call back from her PCP about her osteoporosis. Pt stated she does not want to communicate through the [employer] at all. She wants a call back from her doctor. Please advise.

## 2024-02-05 ENCOUNTER — TELEMEDICINE (OUTPATIENT)
Dept: FAMILY MEDICINE | Facility: CLINIC | Age: 64
End: 2024-02-05
Payer: COMMERCIAL

## 2024-02-05 DIAGNOSIS — J45.909 ASTHMA, UNSPECIFIED ASTHMA SEVERITY, UNSPECIFIED WHETHER COMPLICATED, UNSPECIFIED WHETHER PERSISTENT: ICD-10-CM

## 2024-02-05 DIAGNOSIS — M81.0 AGE-RELATED OSTEOPOROSIS WITHOUT CURRENT PATHOLOGICAL FRACTURE: Primary | ICD-10-CM

## 2024-02-05 PROCEDURE — 99213 OFFICE O/P EST LOW 20 MIN: CPT | Mod: 95 | Performed by: SURGERY

## 2024-02-05 RX ORDER — IBANDRONATE SODIUM 150 MG/1
150 TABLET, FILM COATED ORAL
Qty: 3 TABLET | Refills: 3 | Status: SHIPPED | OUTPATIENT
Start: 2024-02-05 | End: 2025-02-04

## 2024-02-05 NOTE — PROGRESS NOTES
Verification of Patient Location:  The patient affirms they are currently located in the following state: [place]    Request for Consent:    Audio and Video Encounter   Javon, my name is Leeann Ch MD.  Before we proceed, can you please verify your identification by telling me your full name and date of birth?  Can you tell me who is in the room with you?    You and I are about to have a telemedicine check-in or visit because you have requested it.  This is a live video-conference.  I am a real person, speaking to you in real time.  There is no one else with me on the video-conference. I am not recording this conversation and I am asking you not to record it.  This telemedicine visit will be billed to your health insurance or you, if you are self-insured.  You understand you will be responsible for any copayments or coinsurances that apply to your telemedicine visit.  Communication platform used for this encounter:  Mbite Video Visit (Epic Video Client)       Before starting our telemedicine visit, I am required to get your consent for this virtual check-in or visit by telemedicine. Do you consent?      Patient Response to Request for Consent:  Yes      Visit Documentation:  Subjective     Patient ID: Brian Dean is a 63 y.o. female.  1960      HPI    Here to discuss DEXA scan results. Her gynecologist ordered her test, and patient forwarded results to my attention (inbasket chat).    AP spine L2-L4                BMD:  0.882 g/cm2    T-score: -2.7                                                                                Left: Total hip                  BMD: 0.751 g/cm2     T-score: -2.0                            Left: Femoral neck           BMD:  0.666 g/cm2     T-score:  -2.7      Right: Total hip                BMD: 0.749 g/cm2     T-score:  -2.0                              Right: Femoral neck        BMD:  0.702 g/cm2      T-score:  -2.4     Fractured right ankle two years ago  when she became tangled in her dog's leash and fell.           Review of Systems   All other systems reviewed and are negative.        Assessment/Plan   Diagnoses and all orders for this visit:    Age-related osteoporosis without current pathological fracture (Primary)  Assessment & Plan:  We reviewed treatment options, including oral bisphosphonates, injectible bisphosphonate, Reclast, and Prolia. I am recommending weekly Boniva for her given that she requires lifetime aspirin therapy and there is higher risk of gastrointestinal side effects when aspirin and Fosamax are taken together.    We discussed common side effects, the dosing regimen and the need to stop treatment in advance of any major dental procedures. We discussed treatment duration of up to 5 years, however, we will recheck a DEXA scan in 2 years to assess treatment response.    Orders:  -     ibandronate (BONIVA) 150 mg tablet; Take 1 tablet (150 mg total) by mouth every 30 (thirty) days. Take in AM with glass of water prior to food, don't lie down for 30 minutes.    Asthma, unspecified asthma severity, unspecified whether complicated, unspecified whether persistent  Assessment & Plan:  Stable. Managed by her Allergist.        Time Spent:  I spent 10 minutes on this date of service performing the following activities: obtaining history, entering orders, documenting and providing counseling and education.

## 2024-02-05 NOTE — ASSESSMENT & PLAN NOTE
We reviewed treatment options, including oral bisphosphonates, injectible bisphosphonate, Reclast, and Prolia. I am recommending weekly Boniva for her given that she requires lifetime aspirin therapy and there is higher risk of gastrointestinal side effects when aspirin and Fosamax are taken together.    We discussed common side effects, the dosing regimen and the need to stop treatment in advance of any major dental procedures. We discussed treatment duration of up to 5 years, however, we will recheck a DEXA scan in 2 years to assess treatment response.

## 2024-02-05 NOTE — PATIENT INSTRUCTIONS
Please note that Aspirin is listed as a potential interaction on this printout. I reviewed all existing data for this medication, and it is considered to be safe with your daily Aspirin.    Please also take a Vitamin D supplement, if you are not doing so already. I recommend 800-1000 IU vitamin D daily, which can be taken any time of day.

## 2024-02-08 ENCOUNTER — TELEPHONE (OUTPATIENT)
Dept: FAMILY MEDICINE | Facility: CLINIC | Age: 64
End: 2024-02-08
Payer: COMMERCIAL

## 2024-02-08 RX ORDER — METOPROLOL SUCCINATE 25 MG/1
12.5 TABLET, EXTENDED RELEASE ORAL DAILY
Qty: 45 TABLET | Refills: 3 | Status: SHIPPED | OUTPATIENT
Start: 2024-02-08

## 2024-02-08 RX ORDER — BUPROPION HYDROCHLORIDE 150 MG/1
150 TABLET ORAL DAILY
Qty: 90 TABLET | Refills: 1 | Status: SHIPPED | OUTPATIENT
Start: 2024-02-08 | End: 2024-07-01

## 2024-02-08 NOTE — TELEPHONE ENCOUNTER
Pt called stating she only has one pill left of her wellbutrin and needs a refill today. Please advise.

## 2024-02-08 NOTE — TELEPHONE ENCOUNTER
Medicine Refill Request  Pt called requests a refill of       metoprolol succinate XL (TOPROL-XL) 25 mg 24 hr tablet        90#    Pt has 1 pill left       Pt states the pharmacy has sent to two requests for a refill but did not receive a response           Last Office Visit: Visit date not found   Last Consult Visit: Visit date not found  Last Telemedicine Visit: Visit date not found    Next Appointment: Visit date not found      Current Outpatient Medications:   •  albuterol HFA (VENTOLIN HFA) 90 mcg/actuation inhaler, as needed., Disp: , Rfl:   •  aspirin 81 mg enteric coated tablet, Take 81 mg by mouth daily., Disp: , Rfl:   •  atorvastatin (LIPITOR) 40 mg tablet, take 1 tablet by mouth nightly, Disp: 90 tablet, Rfl: 3  •  azelastine (ASTELIN) 137 mcg (0.1 %) nasal spray, as needed., Disp: , Rfl:   •  buPROPion XL (WELLBUTRIN XL) 150 mg 24 hr tablet, Take 1 tablet (150 mg total) by mouth daily., Disp: 90 tablet, Rfl: 0  •  cetirizine (ZyrTEC) 10 mg tablet, daily., Disp: , Rfl:   •  DULoxetine (CYMBALTA) 30 mg capsule, Take 3 capsules (90 mg total) by mouth daily., Disp: 270 capsule, Rfl: 3  •  eszopiclone (LUNESTA) 3 mg tablet, Take 1 tablet (3 mg total) by mouth nightly. Take immediately before bedtime., Disp: 30 tablet, Rfl: 3  •  fluorouraciL (EFUDEX) 5 % cream, , Disp: , Rfl:   •  ibandronate (BONIVA) 150 mg tablet, Take 1 tablet (150 mg total) by mouth every 30 (thirty) days. Take in AM with glass of water prior to food, don't lie down for 30 minutes., Disp: 3 tablet, Rfl: 3  •  magnesium oxide (MAG-OX) 400 mg (241.3 mg magnesium) tablet, Take 1 tablet (400 mg total) by mouth 2 (two) times a day., Disp: 180 tablet, Rfl: 3  •  metoprolol succinate XL (TOPROL-XL) 25 mg 24 hr tablet, take 1/2 tablet by mouth once daily, Disp: 45 tablet, Rfl: 3  •  montelukast (SINGULAIR) 10 mg tablet, Take 1 tablet by mouth nightly., Disp: , Rfl:   •  psyllium (METAMUCIL SMOOTH TEXTURE) 3.4 gram packet, Take by mouth daily.,  Disp: , Rfl:   •  SYMBICORT 80-4.5 mcg/actuation inhaler, as needed., Disp: , Rfl:       BP Readings from Last 3 Encounters:   09/12/23 112/68   05/31/23 100/70   03/20/23 122/68       Recent Lab results:  Lab Results   Component Value Date    CHOL 156 06/01/2023   ,   Lab Results   Component Value Date    HDL 77 06/01/2023   ,   Lab Results   Component Value Date    LDLCALC 63 06/01/2023   ,   Lab Results   Component Value Date    TRIG 79 06/01/2023        Lab Results   Component Value Date    GLUCOSE 112 (H) 06/01/2023   ,   Lab Results   Component Value Date    HGBA1C 5.4 06/01/2023         Lab Results   Component Value Date    CREATININE 0.9 06/01/2023       Lab Results   Component Value Date    TSH 1.59 06/01/2023           Lab Results   Component Value Date    HGBA1C 5.4 06/01/2023

## 2024-02-08 NOTE — TELEPHONE ENCOUNTER
Medicine Refill Request    Last Office Visit: 9/12/2023   Last Consult Visit: Visit date not found  Last Telemedicine Visit: 2/5/2024 Leeann Townsend MD    Next Appointment: 9/16/2024      Current Outpatient Medications:   •  albuterol HFA (VENTOLIN HFA) 90 mcg/actuation inhaler, as needed., Disp: , Rfl:   •  aspirin 81 mg enteric coated tablet, Take 81 mg by mouth daily., Disp: , Rfl:   •  atorvastatin (LIPITOR) 40 mg tablet, take 1 tablet by mouth nightly, Disp: 90 tablet, Rfl: 3  •  azelastine (ASTELIN) 137 mcg (0.1 %) nasal spray, as needed., Disp: , Rfl:   •  buPROPion XL (WELLBUTRIN XL) 150 mg 24 hr tablet, Take 1 tablet (150 mg total) by mouth daily., Disp: 90 tablet, Rfl: 0  •  cetirizine (ZyrTEC) 10 mg tablet, daily., Disp: , Rfl:   •  DULoxetine (CYMBALTA) 30 mg capsule, Take 3 capsules (90 mg total) by mouth daily., Disp: 270 capsule, Rfl: 3  •  eszopiclone (LUNESTA) 3 mg tablet, Take 1 tablet (3 mg total) by mouth nightly. Take immediately before bedtime., Disp: 30 tablet, Rfl: 3  •  fluorouraciL (EFUDEX) 5 % cream, , Disp: , Rfl:   •  ibandronate (BONIVA) 150 mg tablet, Take 1 tablet (150 mg total) by mouth every 30 (thirty) days. Take in AM with glass of water prior to food, don't lie down for 30 minutes., Disp: 3 tablet, Rfl: 3  •  magnesium oxide (MAG-OX) 400 mg (241.3 mg magnesium) tablet, Take 1 tablet (400 mg total) by mouth 2 (two) times a day., Disp: 180 tablet, Rfl: 3  •  metoprolol succinate XL (TOPROL-XL) 25 mg 24 hr tablet, take 1/2 tablet by mouth once daily, Disp: 45 tablet, Rfl: 3  •  montelukast (SINGULAIR) 10 mg tablet, Take 1 tablet by mouth nightly., Disp: , Rfl:   •  psyllium (METAMUCIL SMOOTH TEXTURE) 3.4 gram packet, Take by mouth daily., Disp: , Rfl:   •  SYMBICORT 80-4.5 mcg/actuation inhaler, as needed., Disp: , Rfl:       BP Readings from Last 3 Encounters:   09/12/23 112/68   05/31/23 100/70   03/20/23 122/68       Recent Lab results:  Lab Results   Component Value  Date    CHOL 156 06/01/2023   ,   Lab Results   Component Value Date    HDL 77 06/01/2023   ,   Lab Results   Component Value Date    LDLCALC 63 06/01/2023   ,   Lab Results   Component Value Date    TRIG 79 06/01/2023        Lab Results   Component Value Date    GLUCOSE 112 (H) 06/01/2023   ,   Lab Results   Component Value Date    HGBA1C 5.4 06/01/2023         Lab Results   Component Value Date    CREATININE 0.9 06/01/2023       Lab Results   Component Value Date    TSH 1.59 06/01/2023           Lab Results   Component Value Date    HGBA1C 5.4 06/01/2023

## 2024-02-20 ENCOUNTER — TELEPHONE (OUTPATIENT)
Dept: FAMILY MEDICINE | Facility: CLINIC | Age: 64
End: 2024-02-20
Payer: COMMERCIAL

## 2024-02-20 DIAGNOSIS — F41.9 ANXIETY: ICD-10-CM

## 2024-02-20 RX ORDER — DULOXETIN HYDROCHLORIDE 30 MG/1
90 CAPSULE, DELAYED RELEASE ORAL DAILY
Qty: 270 CAPSULE | Refills: 3 | Status: SHIPPED | OUTPATIENT
Start: 2024-02-20

## 2024-02-20 NOTE — TELEPHONE ENCOUNTER
Pt attempted to get a refill of DULoxetine (CYMBALTA) 30 mg capsule  but the pharmacy told her that she has to have a visit with her Dr. The pt stated that she had a conversation regarding another medication last week with  and wonders if she still needs a visit. She has 3 days of Medication and is leaving town on Thursday    Please advise and give the pt a call back once sorted out [phone]

## 2024-03-14 PROBLEM — R73.01 ELEVATED FASTING GLUCOSE: Status: ACTIVE | Noted: 2024-03-14

## 2024-03-14 NOTE — ASSESSMENT & PLAN NOTE
Total cholesterol 156, triglycerides 79, HDL 77, and LDL of 63 on June lipids.  Lipids well controlled on present regimen  Continue present medications and low fat, low cholesterol diet  For fasting labs before return

## 2024-03-14 NOTE — ASSESSMENT & PLAN NOTE
She underwent robotic bypass x1, LIMA to LAD, on 7/16/2021..        Continue ASA and atorvastatin

## 2024-03-14 NOTE — ASSESSMENT & PLAN NOTE
7/14/21 R/L heart catheterization showed 70% mid LAD focal stenosis just distal to a large diagonal vessel.  The borders of the lesion appear smooth and the lack of other angiographic disease suggests FMD or intramural hematoma as etiology rather than atherosclerotic disease.     7/14/2021 carotid ultrasound showed no hemodynamically significant stenosis bilaterally.        She was seen by Dr. Ricardo Otero at [facility] in [place] for evaluation for FMD.   CTA or head/neck showed 3 mm intracranial (left supraclinoid internal cerebral artery) aneurysm.       4/18/22 MRA of head showed stable 3 mm intracranial aneurysm- Dr. Otero recommended evaluation by Neuro-interventionalist and yearly MRA of brain.     3/9/22 CTA of chest/abdomen showed mild beading of proximal left external iliac artery and distal branch of right renal-suspicious for FMD  LIMA to LAD appeared patent, luminal irregularities of mid LAD-just prior to anastomosis.  Mid RCA evaluation limited by motion artifact  No high grade stenosis suggested of Left Main or LCX.  Aorta-no evidence of narrowing, dissection or aneurysm.

## 2024-03-14 NOTE — ASSESSMENT & PLAN NOTE
7/14/2021 right/left heart catheterization showed Low -normal LVEDP  Right heart catheterization showed low right and left heart filling pressures (RA 1, PA 30/8 (15), and PCW 3 mm Hg).  Cardiac output and index of 6.32 L/min and 3.9 L/min/m2 respectively.         7/15/2021 echo with LVEF of 60%, no regional wall motion abnormalities, impaired relaxation with trace tricuspid regurgitation and estimated RVSP of 26 mmHg.     No SOB  No signs of CHF

## 2024-03-15 ENCOUNTER — TELEPHONE (OUTPATIENT)
Dept: FAMILY MEDICINE | Facility: CLINIC | Age: 64
End: 2024-03-15
Payer: COMMERCIAL

## 2024-03-15 RX ORDER — VALACYCLOVIR HYDROCHLORIDE 1 G/1
1000 TABLET, FILM COATED ORAL 3 TIMES DAILY
Qty: 21 TABLET | Refills: 0 | Status: SHIPPED | OUTPATIENT
Start: 2024-03-15 | End: 2024-09-16 | Stop reason: SDUPTHER

## 2024-03-15 NOTE — TELEPHONE ENCOUNTER
Pt had a herpes virus outbreak last night and would like a prescription of valtrex to be sent in to the [facility] on [address]    Plz assist

## 2024-03-15 NOTE — TELEPHONE ENCOUNTER
Pt has herpetic rash on her right buttocks. Tingling.  Has had in past in the same area - about 5 times    Last occurred in Aug 2023 when on vacation.   No medical care.    Has taken Valtrex in past for this.    Pt does not know if shingles.    She is going to send a photo.     Will treat with valtrex 1g  TID for 7 days    ONSITE OV if not improving with valtrex.

## 2024-03-20 ENCOUNTER — OFFICE VISIT (OUTPATIENT)
Dept: CARDIOLOGY | Facility: CLINIC | Age: 64
End: 2024-03-20
Payer: COMMERCIAL

## 2024-03-20 VITALS
RESPIRATION RATE: 16 BRPM | HEIGHT: 66 IN | SYSTOLIC BLOOD PRESSURE: 126 MMHG | DIASTOLIC BLOOD PRESSURE: 70 MMHG | HEART RATE: 64 BPM | OXYGEN SATURATION: 97 % | BODY MASS INDEX: 21.38 KG/M2 | WEIGHT: 133 LBS

## 2024-03-20 DIAGNOSIS — R06.09 DOE (DYSPNEA ON EXERTION): ICD-10-CM

## 2024-03-20 DIAGNOSIS — E78.5 DYSLIPIDEMIA: ICD-10-CM

## 2024-03-20 DIAGNOSIS — Z95.1 S/P CABG X 1: ICD-10-CM

## 2024-03-20 DIAGNOSIS — I25.42 CORONARY ARTERY DISSECTION: Primary | ICD-10-CM

## 2024-03-20 DIAGNOSIS — E83.42 HYPOMAGNESEMIA: ICD-10-CM

## 2024-03-20 DIAGNOSIS — I77.3 FIBROMUSCULAR DYSPLASIA (CMS/HCC): ICD-10-CM

## 2024-03-20 DIAGNOSIS — I21.4 NSTEMI (NON-ST ELEVATED MYOCARDIAL INFARCTION) (CMS/HCC): ICD-10-CM

## 2024-03-20 DIAGNOSIS — R00.2 PALPITATIONS: ICD-10-CM

## 2024-03-20 DIAGNOSIS — R73.01 ELEVATED FASTING GLUCOSE: ICD-10-CM

## 2024-03-20 DIAGNOSIS — Z86.79 HISTORY OF PERICARDITIS: ICD-10-CM

## 2024-03-20 LAB
ATRIAL RATE: 65
P AXIS: 58
PR INTERVAL: 136
QRS DURATION: 74
QT INTERVAL: 402
QTC CALCULATION(BAZETT): 418
R AXIS: 44
T WAVE AXIS: 74
VENTRICULAR RATE: 65

## 2024-03-20 PROCEDURE — 99214 OFFICE O/P EST MOD 30 MIN: CPT | Performed by: INTERNAL MEDICINE

## 2024-03-20 PROCEDURE — 3008F BODY MASS INDEX DOCD: CPT | Performed by: INTERNAL MEDICINE

## 2024-03-20 PROCEDURE — 93000 ELECTROCARDIOGRAM COMPLETE: CPT | Performed by: INTERNAL MEDICINE

## 2024-03-20 RX ORDER — METOPROLOL TARTRATE 25 MG/1
25 TABLET, FILM COATED ORAL AS NEEDED
Qty: 5 TABLET | Refills: 6 | Status: SHIPPED | OUTPATIENT
Start: 2024-03-20 | End: 2024-09-16

## 2024-03-20 ASSESSMENT — ENCOUNTER SYMPTOMS
DYSPNEA ON EXERTION: 1
BACK PAIN: 0
DIAPHORESIS: 0
NAUSEA: 0
SHORTNESS OF BREATH: 0
DEPRESSION: 0
DIZZINESS: 0
HEARTBURN: 0
COUGH: 0
NERVOUS/ANXIOUS: 1
SNORING: 0
CLAUDICATION: 0
WEIGHT GAIN: 0
MEMORY LOSS: 0
WEIGHT LOSS: 0
PALPITATIONS: 1

## 2024-03-20 NOTE — ASSESSMENT & PLAN NOTE
Patient was seen in the ED on 8/29/21 for complaints of chest pain.  CT of the chest was negative for PE and showed just trace pericardial effusion.  Troponin level negative x1.  She had resumed her NSAID and colchicine. She was treated for presumed pericarditis. She took ibuprofen for a few days with resolution of the symptom.       This has not recurred.

## 2024-03-20 NOTE — ASSESSMENT & PLAN NOTE
One episode of 2 hours  I gave her script for prn metoprolol tartrate   I  discussed options of outpatient telemetry of variable duration based on frequency of episodes.

## 2024-03-20 NOTE — LETTER
"March 20, 2024     Leeann Ch MD  [address]  [address]  [facility] [place] [address]    Patient: Brian Dean  YOB: 1960  Date of Visit: 3/20/2024      Dear Dr. Osiris Ch:    Thank you for referring Brian Dean to me for evaluation. Below are my notes for this consultation.    If you have questions, please do not hesitate to call me. I look forward to following your patient along with you.         Sincerely,        Eleazar Hall MD        CC: No Recipients    Eleazar Hall MD  3/20/2024  2:00 PM  Signed     Cardiology Note          HPI   Brian Dean is a 63 y.o. female presents for her follow up visit.  She will walk up to 4 miles, up inclines, several times per week and does \"body pump classes\" 3 days per week.  No symptoms with exercise.  She will note feeling SOB going up 3 flights of steps, stable for her.  She had one episode of fast heart rate when waking up lasting several hours.  No associated symptoms, just felt like her heart was racing.  This occurred in the Fall of last year with no recurrence.    She may feel lightheaded with quick positional movements.  Patient  denies chest distress, orthopnea, PND, edema, syncope or near syncope.       Past Medical History:   Diagnosis Date   • Allergies    • Anxiety    • Asthma    • Coronary artery disease    • Coronary artery dissection    • Diverticulitis of colon    • Heart disease    • History of pericarditis 10/29/2021    Patient was seen in the ED on 8/29/21 for complaints of chest pain.  CT of the chest was negative for PE and showed just trace pericardial effusion. Troponin level negative x1. She had resumed her NSAID and colchicine. She was treated for presumed pericarditis. She took ibuprofen for a few days with resolution of the symptom   • Lipid disorder    • NSTEMI (non-ST elevated myocardial infarction) (CMS/HCC)    • Osteoporosis    • Parvovirus B19 infection        Past Surgical History:   Procedure " Laterality Date   • CORONARY ARTERY BYPASS GRAFT  07/16/2021    robotic - single bypass   • REDUCTION MAMMAPLASTY     • REDUCTION MAMMAPLASTY         Social History     Tobacco Use   • Smoking status: Never   • Smokeless tobacco: Never   Vaping Use   • Vaping Use: Never used   Substance Use Topics   • Alcohol use: Yes     Comment: on occasion   • Drug use: Yes     Types: Marijuana       Family History   Problem Relation Age of Onset   • Heart disease Biological Mother    • Hypertension Biological Mother    • Atrial fibrillation Biological Mother    • Diabetes Biological Mother    • Heart disease Biological Father    • Heart disease Biological Brother        Allergies  Sulfa (sulfonamide antibiotics) and Sulfamethoxazole-trimethoprim    Current Outpatient Medications   Medication Sig Dispense Refill   • albuterol HFA (VENTOLIN HFA) 90 mcg/actuation inhaler as needed.     • aspirin 81 mg enteric coated tablet Take 81 mg by mouth daily.     • atorvastatin (LIPITOR) 40 mg tablet take 1 tablet by mouth nightly 90 tablet 3   • azelastine (ASTELIN) 137 mcg (0.1 %) nasal spray as needed.     • buPROPion XL (WELLBUTRIN XL) 150 mg 24 hr tablet TAKE ONE TABLET BY MOUTH ONCE DAILY 90 tablet 1   • cetirizine (ZyrTEC) 10 mg tablet daily.     • DULoxetine (CYMBALTA) 30 mg capsule Take 3 capsules (90 mg total) by mouth daily. 270 capsule 3   • eszopiclone (LUNESTA) 3 mg tablet Take 1 tablet (3 mg total) by mouth nightly. Take immediately before bedtime. 30 tablet 3   • fluorouraciL (EFUDEX) 5 % cream      • ibandronate (BONIVA) 150 mg tablet Take 1 tablet (150 mg total) by mouth every 30 (thirty) days. Take in AM with glass of water prior to food, don't lie down for 30 minutes. 3 tablet 3   • magnesium oxide (MAG-OX) 400 mg (241.3 mg magnesium) tablet Take 1 tablet (400 mg total) by mouth 2 (two) times a day. 180 tablet 3   • metoprolol succinate XL (TOPROL-XL) 25 mg 24 hr tablet Take 0.5 tablets (12.5 mg total) by mouth daily. 45  tablet 3   • metoprolol tartrate (LOPRESSOR) 25 mg tablet Take 1 tablet (25 mg total) by mouth as needed (palpitations). 5 tablet 6   • montelukast (SINGULAIR) 10 mg tablet Take 1 tablet by mouth nightly.     • psyllium (METAMUCIL SMOOTH TEXTURE) 3.4 gram packet Take by mouth daily.     • SYMBICORT 80-4.5 mcg/actuation inhaler as needed.     • valACYclovir (VALTREX) 1 gram tablet Take 1 tablet (1,000 mg total) by mouth 3 (three) times a day for 7 days. 21 tablet 0     No current facility-administered medications for this visit.         Review of Systems   Constitutional: Negative for diaphoresis, weight gain and weight loss.   Eyes: Negative for visual disturbance.   Cardiovascular: Positive for dyspnea on exertion and palpitations. Negative for chest pain, claudication and leg swelling.   Respiratory: Negative for cough, shortness of breath and snoring.    Skin: Positive for rash.   Musculoskeletal: Negative for arthritis, back pain, joint pain and muscle weakness.   Gastrointestinal: Negative for heartburn and nausea.   Neurological: Negative for dizziness.   Psychiatric/Behavioral: Negative for depression and memory loss. The patient is nervous/anxious.        Objective     Vitals:    03/20/24 1248   BP: 126/70   Pulse: 64   Resp: 16   SpO2: 97%   /76 left arm sitting    Physical Exam  Constitutional:       Appearance: She is well-developed.   HENT:      Head: Normocephalic.   Neck:      Vascular: No JVD.   Cardiovascular:      Rate and Rhythm: Normal rate.      Heart sounds: Normal heart sounds.   Pulmonary:      Breath sounds: Normal breath sounds.   Abdominal:      General: Bowel sounds are normal.      Palpations: Abdomen is soft.   Skin:     Findings: Rash (both upper arms ) present.   Neurological:      Mental Status: She is alert and oriented to person, place, and time.   Psychiatric:         Behavior: Behavior normal.         Lab Results   Component Value Date    WBC 7.15 11/22/2023    HGB 12.5  11/22/2023     11/22/2023    CHOL 156 06/01/2023    TRIG 79 06/01/2023    HDL 77 06/01/2023    ALT 25 06/01/2023    AST 31 06/01/2023     (L) 06/01/2023    K 5.2 (H) 06/01/2023    CREATININE 0.9 06/01/2023    TSH 1.59 06/01/2023    INR 0.9 07/19/2021    HGBA1C 5.4 06/01/2023     LDL cholesterol of 63 on June of 2023         ECG   Sinus, age indeterminate anteroseptal infarction         Wt Readings from Last 3 Encounters:   03/20/24 60.3 kg (133 lb)   09/12/23 62.1 kg (136 lb 12.8 oz)   05/31/23 62.1 kg (136 lb 12.8 oz)       Problem List Items Addressed This Visit        High    Coronary artery dissection - Primary     12/27/ 2005, she had a myocardial infarction due to a coronary artery dissection. Cath report said a second diagonal branch was the culprit vessel.  No PCI was performed. She presented at that time with severe jaw pain that then radiated into the chest and left arm. It was associated with severe nausea and some dyspnea.        7/14/21 R/L heart catheterization showed 70% mid LAD focal stenosis just distal to a large diagonal vessel.  The borders of the lesion appear smooth and the lack of other angiographic disease suggests FMD or intramural hematoma as etiology rather than atherosclerotic disease.     Robotic HEARD to LAD on 7/16/21        No angina  Stable ECG  Continue ASA          Relevant Medications    metoprolol tartrate (LOPRESSOR) 25 mg tablet    Other Relevant Orders    [facility] MUSE ECG 12 lead (clinic performed) (Completed)       Medium    Fibromuscular dysplasia (CMS/HCC)     7/14/21 R/L heart catheterization showed 70% mid LAD focal stenosis just distal to a large diagonal vessel.  The borders of the lesion appear smooth and the lack of other angiographic disease suggests FMD or intramural hematoma as etiology rather than atherosclerotic disease.     7/14/2021 carotid ultrasound showed no hemodynamically significant stenosis bilaterally.        She was seen by Dr. Ricardo Otero  at [facility] in [place] for evaluation for FMD.   CTA or head/neck showed 3 mm intracranial (left supraclinoid internal cerebral artery) aneurysm.       4/18/22 MRA of head showed stable 3 mm intracranial aneurysm- Dr. Otero recommended evaluation by Neuro-interventionalist and yearly MRA of brain.     3/9/22 CTA of chest/abdomen showed mild beading of proximal left external iliac artery and distal branch of right renal-suspicious for FMD  LIMA to LAD appeared patent, luminal irregularities of mid LAD-just prior to anastomosis.  Mid RCA evaluation limited by motion artifact  No high grade stenosis suggested of Left Main or LCX.  Aorta-no evidence of narrowing, dissection or aneurysm.                   Low    NSTEMI (non-ST elevated myocardial infarction) (CMS/HCC)     Non-STEMI in 2005 at the time of her coronary artery dissection         Relevant Medications    metoprolol tartrate (LOPRESSOR) 25 mg tablet    S/P CABG x 1     She underwent robotic bypass x1, LIMA to LAD, on 7/16/2021..        Continue ASA and atorvastatin          MCGUIRE (dyspnea on exertion)     7/14/2021 right/left heart catheterization showed Low -normal LVEDP  Right heart catheterization showed low right and left heart filling pressures (RA 1, PA 30/8 (15), and PCW 3 mm Hg).  Cardiac output and index of 6.32 L/min and 3.9 L/min/m2 respectively.         7/15/2021 echo with LVEF of 60%, no regional wall motion abnormalities, impaired relaxation with trace tricuspid regurgitation and estimated RVSP of 26 mmHg.     No SOB  No signs of CHF         Palpitations     One episode of 2 hours  I gave her script for prn metoprolol tartrate   I  discussed options of outpatient telemetry of variable duration based on frequency of episodes.            Dyslipidemia     Total cholesterol 156, triglycerides 79, HDL 77, and LDL of 63 on June lipids.  Lipids well controlled on present regimen  Continue present medications and low fat, low cholesterol diet  For  fasting labs before return         Relevant Orders    CBC and Differential    Comprehensive metabolic panel    Lipid panel    CK TOTAL (NO REFLEX)    TSH w reflex FT4    History of pericarditis     Patient was seen in the ED on 8/29/21 for complaints of chest pain.  CT of the chest was negative for PE and showed just trace pericardial effusion.  Troponin level negative x1.  She had resumed her NSAID and colchicine. She was treated for presumed pericarditis. She took ibuprofen for a few days with resolution of the symptom.       This has not recurred.          Elevated fasting glucose     Fasting glucose level of 112 with hemoglobin A1c of 5.4 on 6/23 labs  For fasting labs         Relevant Orders    Hemoglobin A1c   Other Visit Diagnoses     Hypomagnesemia        Relevant Orders    Magnesium         I, MISAEL Pacheco, am scribing for, and in the presence of, Eleazar Hall MD.    3/20/2024 1:59 PM  Eleazar LAMAS MD,  personally performed the services described in this documentation as scribed by Mariah Valladares in my presence, and it is both accurate and complete.    3/20/2024 1:59 PM    She will return in one year.     Eleazar Hall MD  3/20/2024

## 2024-03-20 NOTE — PROGRESS NOTES
"   Cardiology Note          HPI   Brian Dean is a 63 y.o. female presents for her follow up visit.  She will walk up to 4 miles, up inclines, several times per week and does \"body pump classes\" 3 days per week.  No symptoms with exercise.  She will note feeling SOB going up 3 flights of steps, stable for her.  She had one episode of fast heart rate when waking up lasting several hours.  No associated symptoms, just felt like her heart was racing.  This occurred in the Fall of last year with no recurrence.    She may feel lightheaded with quick positional movements.  Patient  denies chest distress, orthopnea, PND, edema, syncope or near syncope.       Past Medical History:   Diagnosis Date   • Allergies    • Anxiety    • Asthma    • Coronary artery disease    • Coronary artery dissection    • Diverticulitis of colon    • Heart disease    • History of pericarditis 10/29/2021    Patient was seen in the ED on 8/29/21 for complaints of chest pain.  CT of the chest was negative for PE and showed just trace pericardial effusion. Troponin level negative x1. She had resumed her NSAID and colchicine. She was treated for presumed pericarditis. She took ibuprofen for a few days with resolution of the symptom   • Lipid disorder    • NSTEMI (non-ST elevated myocardial infarction) (CMS/HCC)    • Osteoporosis    • Parvovirus B19 infection        Past Surgical History:   Procedure Laterality Date   • CORONARY ARTERY BYPASS GRAFT  07/16/2021    robotic - single bypass   • REDUCTION MAMMAPLASTY     • REDUCTION MAMMAPLASTY         Social History     Tobacco Use   • Smoking status: Never   • Smokeless tobacco: Never   Vaping Use   • Vaping Use: Never used   Substance Use Topics   • Alcohol use: Yes     Comment: on occasion   • Drug use: Yes     Types: Marijuana       Family History   Problem Relation Age of Onset   • Heart disease Biological Mother    • Hypertension Biological Mother    • Atrial fibrillation Biological Mother    • " Diabetes Biological Mother    • Heart disease Biological Father    • Heart disease Biological Brother        Allergies  Sulfa (sulfonamide antibiotics) and Sulfamethoxazole-trimethoprim    Current Outpatient Medications   Medication Sig Dispense Refill   • albuterol HFA (VENTOLIN HFA) 90 mcg/actuation inhaler as needed.     • aspirin 81 mg enteric coated tablet Take 81 mg by mouth daily.     • atorvastatin (LIPITOR) 40 mg tablet take 1 tablet by mouth nightly 90 tablet 3   • azelastine (ASTELIN) 137 mcg (0.1 %) nasal spray as needed.     • buPROPion XL (WELLBUTRIN XL) 150 mg 24 hr tablet TAKE ONE TABLET BY MOUTH ONCE DAILY 90 tablet 1   • cetirizine (ZyrTEC) 10 mg tablet daily.     • DULoxetine (CYMBALTA) 30 mg capsule Take 3 capsules (90 mg total) by mouth daily. 270 capsule 3   • eszopiclone (LUNESTA) 3 mg tablet Take 1 tablet (3 mg total) by mouth nightly. Take immediately before bedtime. 30 tablet 3   • fluorouraciL (EFUDEX) 5 % cream      • ibandronate (BONIVA) 150 mg tablet Take 1 tablet (150 mg total) by mouth every 30 (thirty) days. Take in AM with glass of water prior to food, don't lie down for 30 minutes. 3 tablet 3   • magnesium oxide (MAG-OX) 400 mg (241.3 mg magnesium) tablet Take 1 tablet (400 mg total) by mouth 2 (two) times a day. 180 tablet 3   • metoprolol succinate XL (TOPROL-XL) 25 mg 24 hr tablet Take 0.5 tablets (12.5 mg total) by mouth daily. 45 tablet 3   • metoprolol tartrate (LOPRESSOR) 25 mg tablet Take 1 tablet (25 mg total) by mouth as needed (palpitations). 5 tablet 6   • montelukast (SINGULAIR) 10 mg tablet Take 1 tablet by mouth nightly.     • psyllium (METAMUCIL SMOOTH TEXTURE) 3.4 gram packet Take by mouth daily.     • SYMBICORT 80-4.5 mcg/actuation inhaler as needed.     • valACYclovir (VALTREX) 1 gram tablet Take 1 tablet (1,000 mg total) by mouth 3 (three) times a day for 7 days. 21 tablet 0     No current facility-administered medications for this visit.         Review of  Systems   Constitutional: Negative for diaphoresis, weight gain and weight loss.   Eyes: Negative for visual disturbance.   Cardiovascular: Positive for dyspnea on exertion and palpitations. Negative for chest pain, claudication and leg swelling.   Respiratory: Negative for cough, shortness of breath and snoring.    Skin: Positive for rash.   Musculoskeletal: Negative for arthritis, back pain, joint pain and muscle weakness.   Gastrointestinal: Negative for heartburn and nausea.   Neurological: Negative for dizziness.   Psychiatric/Behavioral: Negative for depression and memory loss. The patient is nervous/anxious.        Objective     Vitals:    03/20/24 1248   BP: 126/70   Pulse: 64   Resp: 16   SpO2: 97%   /76 left arm sitting    Physical Exam  Constitutional:       Appearance: She is well-developed.   HENT:      Head: Normocephalic.   Neck:      Vascular: No JVD.   Cardiovascular:      Rate and Rhythm: Normal rate.      Heart sounds: Normal heart sounds.   Pulmonary:      Breath sounds: Normal breath sounds.   Abdominal:      General: Bowel sounds are normal.      Palpations: Abdomen is soft.   Skin:     Findings: Rash (both upper arms ) present.   Neurological:      Mental Status: She is alert and oriented to person, place, and time.   Psychiatric:         Behavior: Behavior normal.         Lab Results   Component Value Date    WBC 7.15 11/22/2023    HGB 12.5 11/22/2023     11/22/2023    CHOL 156 06/01/2023    TRIG 79 06/01/2023    HDL 77 06/01/2023    ALT 25 06/01/2023    AST 31 06/01/2023     (L) 06/01/2023    K 5.2 (H) 06/01/2023    CREATININE 0.9 06/01/2023    TSH 1.59 06/01/2023    INR 0.9 07/19/2021    HGBA1C 5.4 06/01/2023     LDL cholesterol of 63 on June of 2023         ECG   Sinus, age indeterminate anteroseptal infarction          Wt Readings from Last 3 Encounters:   03/20/24 60.3 kg (133 lb)   09/12/23 62.1 kg (136 lb 12.8 oz)   05/31/23 62.1 kg (136 lb 12.8 oz)       Problem  List Items Addressed This Visit        High    Coronary artery dissection - Primary     12/27/ 2005, she had a myocardial infarction due to a coronary artery dissection. Cath report said a second diagonal branch was the culprit vessel.  No PCI was performed. She presented at that time with severe jaw pain that then radiated into the chest and left arm. It was associated with severe nausea and some dyspnea.        7/14/21 R/L heart catheterization showed 70% mid LAD focal stenosis just distal to a large diagonal vessel.  The borders of the lesion appear smooth and the lack of other angiographic disease suggests FMD or intramural hematoma as etiology rather than atherosclerotic disease.     Robotic HEARD to LAD on 7/16/21        No angina  Stable ECG  Continue ASA          Relevant Medications    metoprolol tartrate (LOPRESSOR) 25 mg tablet    Other Relevant Orders    [facility] MUSE ECG 12 lead (clinic performed) (Completed)       Medium    Fibromuscular dysplasia (CMS/HCC)     7/14/21 R/L heart catheterization showed 70% mid LAD focal stenosis just distal to a large diagonal vessel.  The borders of the lesion appear smooth and the lack of other angiographic disease suggests FMD or intramural hematoma as etiology rather than atherosclerotic disease.     7/14/2021 carotid ultrasound showed no hemodynamically significant stenosis bilaterally.        She was seen by Dr. Ricardo Otero at [facility] in [place] for evaluation for FMD.   CTA or head/neck showed 3 mm intracranial (left supraclinoid internal cerebral artery) aneurysm.       4/18/22 MRA of head showed stable 3 mm intracranial aneurysm- Dr. Otero recommended evaluation by Neuro-interventionalist and yearly MRA of brain.     3/9/22 CTA of chest/abdomen showed mild beading of proximal left external iliac artery and distal branch of right renal-suspicious for FMD  LIMA to LAD appeared patent, luminal irregularities of mid LAD-just prior to anastomosis.  Mid RCA  evaluation limited by motion artifact  No high grade stenosis suggested of Left Main or LCX.  Aorta-no evidence of narrowing, dissection or aneurysm.                   Low    NSTEMI (non-ST elevated myocardial infarction) (CMS/[facility])     Non-STEMI in 2005 at the time of her coronary artery dissection         Relevant Medications    metoprolol tartrate (LOPRESSOR) 25 mg tablet    S/P CABG x 1     She underwent robotic bypass x1, LIMA to LAD, on 7/16/2021..        Continue ASA and atorvastatin          MCGUIRE (dyspnea on exertion)     7/14/2021 right/left heart catheterization showed Low -normal LVEDP  Right heart catheterization showed low right and left heart filling pressures (RA 1, PA 30/8 (15), and PCW 3 mm Hg).  Cardiac output and index of 6.32 L/min and 3.9 L/min/m2 respectively.         7/15/2021 echo with LVEF of 60%, no regional wall motion abnormalities, impaired relaxation with trace tricuspid regurgitation and estimated RVSP of 26 mmHg.     No SOB  No signs of CHF         Palpitations     One episode of 2 hours  I gave her script for prn metoprolol tartrate   I  discussed options of outpatient telemetry of variable duration based on frequency of episodes.            Dyslipidemia     Total cholesterol 156, triglycerides 79, HDL 77, and LDL of 63 on June lipids.  Lipids well controlled on present regimen  Continue present medications and low fat, low cholesterol diet  For fasting labs before return         Relevant Orders    CBC and Differential    Comprehensive metabolic panel    Lipid panel    CK TOTAL (NO REFLEX)    TSH w reflex FT4    History of pericarditis     Patient was seen in the ED on 8/29/21 for complaints of chest pain.  CT of the chest was negative for PE and showed just trace pericardial effusion.  Troponin level negative x1.  She had resumed her NSAID and colchicine. She was treated for presumed pericarditis. She took ibuprofen for a few days with resolution of the symptom.       This has not  recurred.          Elevated fasting glucose     Fasting glucose level of 112 with hemoglobin A1c of 5.4 on 6/23 labs  For fasting labs         Relevant Orders    Hemoglobin A1c   Other Visit Diagnoses     Hypomagnesemia        Relevant Orders    Magnesium         I, MISAEL Pacheco, am scribing for, and in the presence of, Eleazar Hall MD.    3/20/2024 1:59 PM  IEleazar MD,  personally performed the services described in this documentation as scribed by Mariah Valladares in my presence, and it is both accurate and complete.    3/20/2024 1:59 PM    She will return in one year.     Eleazar Hall MD  3/20/2024

## 2024-05-06 DIAGNOSIS — F41.9 ANXIETY: ICD-10-CM

## 2024-05-06 DIAGNOSIS — F51.01 PRIMARY INSOMNIA: ICD-10-CM

## 2024-05-06 RX ORDER — ESZOPICLONE 3 MG/1
3 TABLET, FILM COATED ORAL NIGHTLY
Qty: 30 TABLET | Refills: 3 | Status: SHIPPED | OUTPATIENT
Start: 2024-05-06 | End: 2024-09-05 | Stop reason: SDUPTHER

## 2024-05-06 NOTE — TELEPHONE ENCOUNTER
Medicine Refill Request    Last Office Visit: 9/12/2023   Last Consult Visit: Visit date not found  Last Telemedicine Visit: 2/5/2024 Leeann Townsend MD    Next Appointment: 9/16/2024      Current Outpatient Medications:     albuterol HFA (VENTOLIN HFA) 90 mcg/actuation inhaler, as needed., Disp: , Rfl:     aspirin 81 mg enteric coated tablet, Take 81 mg by mouth daily., Disp: , Rfl:     atorvastatin (LIPITOR) 40 mg tablet, take 1 tablet by mouth nightly, Disp: 90 tablet, Rfl: 3    azelastine (ASTELIN) 137 mcg (0.1 %) nasal spray, as needed., Disp: , Rfl:     buPROPion XL (WELLBUTRIN XL) 150 mg 24 hr tablet, TAKE ONE TABLET BY MOUTH ONCE DAILY, Disp: 90 tablet, Rfl: 1    cetirizine (ZyrTEC) 10 mg tablet, daily., Disp: , Rfl:     DULoxetine (CYMBALTA) 30 mg capsule, Take 3 capsules (90 mg total) by mouth daily., Disp: 270 capsule, Rfl: 3    eszopiclone (LUNESTA) 3 mg tablet, Take 1 tablet (3 mg total) by mouth nightly. Take immediately before bedtime., Disp: 30 tablet, Rfl: 3    fluorouraciL (EFUDEX) 5 % cream, , Disp: , Rfl:     ibandronate (BONIVA) 150 mg tablet, Take 1 tablet (150 mg total) by mouth every 30 (thirty) days. Take in AM with glass of water prior to food, don't lie down for 30 minutes., Disp: 3 tablet, Rfl: 3    magnesium oxide (MAG-OX) 400 mg (241.3 mg magnesium) tablet, Take 1 tablet (400 mg total) by mouth 2 (two) times a day., Disp: 180 tablet, Rfl: 3    metoprolol succinate XL (TOPROL-XL) 25 mg 24 hr tablet, Take 0.5 tablets (12.5 mg total) by mouth daily., Disp: 45 tablet, Rfl: 3    metoprolol tartrate (LOPRESSOR) 25 mg tablet, Take 1 tablet (25 mg total) by mouth as needed (palpitations)., Disp: 5 tablet, Rfl: 6    montelukast (SINGULAIR) 10 mg tablet, Take 1 tablet by mouth nightly., Disp: , Rfl:     psyllium (METAMUCIL SMOOTH TEXTURE) 3.4 gram packet, Take by mouth daily., Disp: , Rfl:     SYMBICORT 80-4.5 mcg/actuation inhaler, as needed., Disp: , Rfl:     valACYclovir (VALTREX) 1  gram tablet, Take 1 tablet (1,000 mg total) by mouth 3 (three) times a day for 7 days., Disp: 21 tablet, Rfl: 0      BP Readings from Last 3 Encounters:   03/20/24 126/70   09/12/23 112/68   05/31/23 100/70       Recent Lab results:  Lab Results   Component Value Date    CHOL 156 06/01/2023   ,   Lab Results   Component Value Date    HDL 77 06/01/2023   ,   Lab Results   Component Value Date    LDLCALC 63 06/01/2023   ,   Lab Results   Component Value Date    TRIG 79 06/01/2023        Lab Results   Component Value Date    GLUCOSE 112 (H) 06/01/2023   ,   Lab Results   Component Value Date    HGBA1C 5.4 06/01/2023         Lab Results   Component Value Date    CREATININE 0.9 06/01/2023       Lab Results   Component Value Date    TSH 1.59 06/01/2023           Lab Results   Component Value Date    HGBA1C 5.4 06/01/2023

## 2024-05-06 NOTE — TELEPHONE ENCOUNTER
Pt is out of Lunesta and is requesting a refill. [facility] says that they have sent over an order to be faxed back on Friday and have sent another one today. Plz assist

## 2024-07-01 RX ORDER — BUPROPION HYDROCHLORIDE 150 MG/1
150 TABLET ORAL DAILY
Qty: 90 TABLET | Refills: 1 | Status: SHIPPED | OUTPATIENT
Start: 2024-07-01 | End: 2024-08-01 | Stop reason: SDUPTHER

## 2024-07-01 NOTE — TELEPHONE ENCOUNTER
Medicine Refill Request    Last Office Visit: 9/12/2023   Last Consult Visit: Visit date not found  Last Telemedicine Visit: 2/5/2024 Leeann Townsend MD    Next Appointment: 9/16/2024      Current Outpatient Medications:     albuterol HFA (VENTOLIN HFA) 90 mcg/actuation inhaler, as needed., Disp: , Rfl:     aspirin 81 mg enteric coated tablet, Take 81 mg by mouth daily., Disp: , Rfl:     atorvastatin (LIPITOR) 40 mg tablet, take 1 tablet by mouth nightly, Disp: 90 tablet, Rfl: 3    azelastine (ASTELIN) 137 mcg (0.1 %) nasal spray, as needed., Disp: , Rfl:     buPROPion XL (WELLBUTRIN XL) 150 mg 24 hr tablet, TAKE ONE TABLET BY MOUTH ONCE DAILY, Disp: 90 tablet, Rfl: 1    cetirizine (ZyrTEC) 10 mg tablet, daily., Disp: , Rfl:     DULoxetine (CYMBALTA) 30 mg capsule, Take 3 capsules (90 mg total) by mouth daily., Disp: 270 capsule, Rfl: 3    eszopiclone (LUNESTA) 3 mg tablet, TAKE 1 TABLET (3 MG TOTAL) BY MOUTH NIGHTLY. TAKE IMMEDIATELY BEFORE BEDTIME, Disp: 30 tablet, Rfl: 3    fluorouraciL (EFUDEX) 5 % cream, , Disp: , Rfl:     ibandronate (BONIVA) 150 mg tablet, Take 1 tablet (150 mg total) by mouth every 30 (thirty) days. Take in AM with glass of water prior to food, don't lie down for 30 minutes., Disp: 3 tablet, Rfl: 3    magnesium oxide (MAG-OX) 400 mg (241.3 mg magnesium) tablet, Take 1 tablet (400 mg total) by mouth 2 (two) times a day., Disp: 180 tablet, Rfl: 3    metoprolol succinate XL (TOPROL-XL) 25 mg 24 hr tablet, Take 0.5 tablets (12.5 mg total) by mouth daily., Disp: 45 tablet, Rfl: 3    metoprolol tartrate (LOPRESSOR) 25 mg tablet, Take 1 tablet (25 mg total) by mouth as needed (palpitations)., Disp: 5 tablet, Rfl: 6    montelukast (SINGULAIR) 10 mg tablet, Take 1 tablet by mouth nightly., Disp: , Rfl:     psyllium (METAMUCIL SMOOTH TEXTURE) 3.4 gram packet, Take by mouth daily., Disp: , Rfl:     SYMBICORT 80-4.5 mcg/actuation inhaler, as needed., Disp: , Rfl:     valACYclovir (VALTREX) 1  gram tablet, Take 1 tablet (1,000 mg total) by mouth 3 (three) times a day for 7 days., Disp: 21 tablet, Rfl: 0      BP Readings from Last 3 Encounters:   03/20/24 126/70   09/12/23 112/68   05/31/23 100/70       Recent Lab results:  Lab Results   Component Value Date    CHOL 156 06/01/2023   ,   Lab Results   Component Value Date    HDL 77 06/01/2023   ,   Lab Results   Component Value Date    LDLCALC 63 06/01/2023   ,   Lab Results   Component Value Date    TRIG 79 06/01/2023        Lab Results   Component Value Date    GLUCOSE 112 (H) 06/01/2023   ,   Lab Results   Component Value Date    HGBA1C 5.4 06/01/2023         Lab Results   Component Value Date    CREATININE 0.9 06/01/2023       Lab Results   Component Value Date    TSH 1.59 06/01/2023           Lab Results   Component Value Date    HGBA1C 5.4 06/01/2023

## 2024-08-01 NOTE — TELEPHONE ENCOUNTER
Medicine Refill Request    Last Office Visit: 9/12/2023   Last Consult Visit: Visit date not found  Last Telemedicine Visit: 2/5/2024 Leeann Townsend MD    Next Appointment: 9/16/2024      Current Outpatient Medications:     albuterol HFA (VENTOLIN HFA) 90 mcg/actuation inhaler, as needed., Disp: , Rfl:     aspirin 81 mg enteric coated tablet, Take 81 mg by mouth daily., Disp: , Rfl:     atorvastatin (LIPITOR) 40 mg tablet, take 1 tablet by mouth nightly, Disp: 90 tablet, Rfl: 3    azelastine (ASTELIN) 137 mcg (0.1 %) nasal spray, as needed., Disp: , Rfl:     buPROPion XL (WELLBUTRIN XL) 150 mg 24 hr tablet, TAKE 1 TABLET BY MOUTH EVERY DAY, Disp: 90 tablet, Rfl: 1    cetirizine (ZyrTEC) 10 mg tablet, daily., Disp: , Rfl:     DULoxetine (CYMBALTA) 30 mg capsule, Take 3 capsules (90 mg total) by mouth daily., Disp: 270 capsule, Rfl: 3    eszopiclone (LUNESTA) 3 mg tablet, TAKE 1 TABLET (3 MG TOTAL) BY MOUTH NIGHTLY. TAKE IMMEDIATELY BEFORE BEDTIME, Disp: 30 tablet, Rfl: 3    fluorouraciL (EFUDEX) 5 % cream, , Disp: , Rfl:     ibandronate (BONIVA) 150 mg tablet, Take 1 tablet (150 mg total) by mouth every 30 (thirty) days. Take in AM with glass of water prior to food, don't lie down for 30 minutes., Disp: 3 tablet, Rfl: 3    magnesium oxide (MAG-OX) 400 mg (241.3 mg magnesium) tablet, Take 1 tablet (400 mg total) by mouth 2 (two) times a day., Disp: 180 tablet, Rfl: 3    metoprolol succinate XL (TOPROL-XL) 25 mg 24 hr tablet, Take 0.5 tablets (12.5 mg total) by mouth daily., Disp: 45 tablet, Rfl: 3    metoprolol tartrate (LOPRESSOR) 25 mg tablet, Take 1 tablet (25 mg total) by mouth as needed (palpitations)., Disp: 5 tablet, Rfl: 6    montelukast (SINGULAIR) 10 mg tablet, Take 1 tablet by mouth nightly., Disp: , Rfl:     psyllium (METAMUCIL SMOOTH TEXTURE) 3.4 gram packet, Take by mouth daily., Disp: , Rfl:     SYMBICORT 80-4.5 mcg/actuation inhaler, as needed., Disp: , Rfl:     valACYclovir (VALTREX) 1 gram  tablet, Take 1 tablet (1,000 mg total) by mouth 3 (three) times a day for 7 days., Disp: 21 tablet, Rfl: 0      BP Readings from Last 3 Encounters:   03/20/24 126/70   09/12/23 112/68   05/31/23 100/70       Recent Lab results:  Lab Results   Component Value Date    CHOL 156 06/01/2023   ,   Lab Results   Component Value Date    HDL 77 06/01/2023   ,   Lab Results   Component Value Date    LDLCALC 63 06/01/2023   ,   Lab Results   Component Value Date    TRIG 79 06/01/2023        Lab Results   Component Value Date    GLUCOSE 112 (H) 06/01/2023   ,   Lab Results   Component Value Date    HGBA1C 5.4 06/01/2023         Lab Results   Component Value Date    CREATININE 0.9 06/01/2023       Lab Results   Component Value Date    TSH 1.59 06/01/2023           Lab Results   Component Value Date    HGBA1C 5.4 06/01/2023

## 2024-08-02 RX ORDER — BUPROPION HYDROCHLORIDE 150 MG/1
150 TABLET ORAL DAILY
Qty: 90 TABLET | Refills: 1 | Status: SHIPPED | OUTPATIENT
Start: 2024-08-02

## 2024-09-05 DIAGNOSIS — F51.01 PRIMARY INSOMNIA: ICD-10-CM

## 2024-09-05 DIAGNOSIS — F41.9 ANXIETY: ICD-10-CM

## 2024-09-05 RX ORDER — ESZOPICLONE 3 MG/1
3 TABLET, FILM COATED ORAL NIGHTLY
Qty: 30 TABLET | Refills: 0 | Status: SHIPPED | OUTPATIENT
Start: 2024-09-05 | End: 2024-10-01 | Stop reason: SDUPTHER

## 2024-09-05 NOTE — TELEPHONE ENCOUNTER
On Call:    Pt ran out of lunesta.  Requesting refill.  Pt requested on Monday and pharmacy reached out a couple times she states.  Advised that I will refill tonight.  Check PDMP and no suspicious behavior.  Advised to call for future refills.

## 2024-09-15 NOTE — ASSESSMENT & PLAN NOTE
Continue Boniva treatment - started 2/5/2024 following evident osteoporosis on DEXA 1/2024.  H/o fracture ankle 2022.  Plan for repeat DEXA 1/2026.

## 2024-09-15 NOTE — PATIENT INSTRUCTIONS
General Healthful Habits:  -Drink unsweetened beverages and aim to get 6-8 glasses of water daily.   -Avoid regular use of ultra-processed foods, added nitrates or nitrites, sweetened beverages, and artifical sweeteners (sucralose, splenda, equal, sweet-n-low), as they dramatically increase insulin and worsen insulin resistance. Insulin resistance is a the most common risk factor for unhealthy weight gain in the [place].   -Avoid unnecessary antibiotics for yourself and in the meats and dairy products you use.  -Limit alcohol to less than 3 servings in a week.  -Participate in regular dedicated physical activity  --- at least 150 minutes per week (30 minutes, 5 days per week). Make at least 60 of those physical activity minutes resistance training.   -Identify personal stressors.  Modify what you can, and aim to balance what you cannot. Develop and practice daily relaxation techniques to balance stress.  Make time for rejuvenating activities and hobbies at least weekly.   -Get adequate and restful sleep every night ---  7.5-8.5 hours a night is what most people require.  Consider a scheduled sleep and wake time if possible.

## 2024-09-15 NOTE — ASSESSMENT & PLAN NOTE
Health maintenance measures updated today.  -Covid-19 vaccine series:Complete on Saturday  -Influenza vaccine: Complete on Saturday  -Tdap:UTD  -Pneumococcal: UTD  -RSV planned  for 3 weeks.  -Pap smears: UTD with Gyn  -Mammogram: Ordered by Gyn  -DEXA: UTD with Gyn, 1/2024- AP lumbar spine with reduced T score.  -Colonoscopy: 2/22/23 - one polyp, diverticula, repeat in 10 years  -Depression screening performed, negative  -Counseled on regular aerobic exercise amounting to a minimum of 150 minutes most days per week and a healthful, whole foods-based diet such as the Mediterranean diet for cardiovascular risk reduction and overall health.

## 2024-09-15 NOTE — PROGRESS NOTES
ESTABLISHED PATIENT PHYSICAL EXAM    Leeann Ch M.D.  [facility] in [place] [address]  [place] [address]  [phone]      HISTORY OF PRESENT ILLNESS      CC: annual physical exam    HPI:  Brian Dean is a 64 y.o. female who presents for an annual physical exam.    Last visit 2/5/24 - telemed discussion re: osteoporosis. Started Boniva once per month, which she continues.     Diverticulitis confirmed on CT scan 7/2024. Rx augmentin. F/u with her Gastro, Dr. Patterson 8/15/24. Continued on metamucil, 3 prunes daily.    Established with new Cardiology in July, Dr. Joy Esquivel, at [facility]. Prior care with Dr. Eleazar Hall, who retired. No changes made to regimen- ASA/metoprolol for h/o spontaneous coronary artery dissection, FMD. Atorva for HLD.    Established with Dr. Faraz Akhtar, Vascular neuro, in April for small left ICA-posterior communicating artery aneurysm. Surveillance CTA planned for 1/2025. Continues ASA.    Labs updated with specialists in August, unremarkable. .    Will see Hematology in October.     Eating more protein - meat, chicken, yogurt, cottage cheese and eggs, protein powder in smoothies.   Exercising: walking 3x weekly  More energy since she starting increasing.     Specialists:  Dr. Joy Esquivel (Cardiology)- MCGUIRE, CAD (CABG 7/16/21)   Dr. Ricardo Otero (Cardiology, [place], [place])- FMD confirmed 3/9/22  Dr. Tim Akhtar (Neurology, [facility]) - ICA aneurysm  Dr. Leonard (Neurointerventionist, [place])- ICA aneurysm  Dr. Nielson (Allergist)  Dr. Patterson (Gastro)  Dr. Franks (Dermatology)  Dr. Shipman- thyroid nodules  Dr. Ralph Brian- thrombocytosis, reactive (following heart surgery)   Dr. Flores (Gyn, [facility])  Dr. White ophth     Dentist: goes twice yearly  Eye Doctor: goes yearly    PAST MEDICAL AND SURGICAL HISTORY        Past Medical History:   Diagnosis Date    Allergies     Anxiety     Asthma     Coronary artery disease     Coronary artery dissection      Diverticulitis of colon     Heart disease     History of pericarditis 10/29/2021    Patient was seen in the ED on 8/29/21 for complaints of chest pain.  CT of the chest was negative for PE and showed just trace pericardial effusion. Troponin level negative x1. She had resumed her NSAID and colchicine. She was treated for presumed pericarditis. She took ibuprofen for a few days with resolution of the symptom    Lipid disorder     NSTEMI (non-ST elevated myocardial infarction) (CMS/HCC)     Osteoporosis     Parvovirus B19 infection        Past Surgical History   Procedure Laterality Date    Coronary artery bypass graft  07/16/2021    robotic - single bypass    Off Pump CABG MID/THORACOTOMY HEARD, ROBOTIC N/A 7/16/2021    Performed by Felix Nix DO at [facility] [place]    Reduction mammaplasty      Reduction mammaplasty      RIGHT & LEFT HEART CATH WITH CORONARY ANGIOGRAPHY N/A 7/14/2021    Performed by Leif Schofield III, MD at [facility] [place]ARDI[facility] [place]/EP     MEDICATIONS          Current Outpatient Medications:     albuterol HFA (VENTOLIN HFA) 90 mcg/actuation inhaler, as needed., Disp: , Rfl:     aspirin 81 mg enteric coated tablet, Take 81 mg by mouth daily., Disp: , Rfl:     atorvastatin (LIPITOR) 40 mg tablet, take 1 tablet by mouth nightly, Disp: 90 tablet, Rfl: 3    azelastine (ASTELIN) 137 mcg (0.1 %) nasal spray, as needed., Disp: , Rfl:     buPROPion XL (WELLBUTRIN XL) 150 mg 24 hr tablet, Take 1 tablet (150 mg total) by mouth daily., Disp: 90 tablet, Rfl: 1    DULoxetine (CYMBALTA) 30 mg capsule, Take 3 capsules (90 mg total) by mouth daily., Disp: 270 capsule, Rfl: 3    eszopiclone (LUNESTA) 3 mg tablet, Take 1 tablet (3 mg total) by mouth nightly. Take immediately before bedtime., Disp: 30 tablet, Rfl: 0    fluorouraciL (EFUDEX) 5 % cream, , Disp: , Rfl:     ibandronate (BONIVA) 150 mg tablet, Take 1 tablet (150 mg total) by mouth every 30 (thirty) days. Take in AM with glass of water prior to food, don't lie  down for 30 minutes., Disp: 3 tablet, Rfl: 3    levocetirizine (XYZAL) 5 mg tablet, Take 5 mg by mouth every evening., Disp: , Rfl:     magnesium oxide (MAG-OX) 400 mg (241.3 mg magnesium) tablet, Take 1 tablet (400 mg total) by mouth 2 (two) times a day., Disp: 180 tablet, Rfl: 3    metoprolol succinate XL (TOPROL-XL) 25 mg 24 hr tablet, Take 0.5 tablets (12.5 mg total) by mouth daily., Disp: 45 tablet, Rfl: 3    metoprolol tartrate (LOPRESSOR) 25 mg tablet, Take 1 tablet (25 mg total) by mouth as needed (palpitations)., Disp: 5 tablet, Rfl: 6    montelukast (SINGULAIR) 10 mg tablet, Take 1 tablet by mouth nightly., Disp: , Rfl:     psyllium (METAMUCIL SMOOTH TEXTURE) 3.4 gram packet, Take by mouth daily., Disp: , Rfl:     SYMBICORT 80-4.5 mcg/actuation inhaler, as needed., Disp: , Rfl:     valACYclovir (VALTREX) 1 gram tablet, Take 1 tablet (1,000 mg total) by mouth 3 (three) times a day for 7 days., Disp: 21 tablet, Rfl: 0    cetirizine (ZyrTEC) 10 mg tablet, daily. (Patient not taking: Reported on 9/16/2024), Disp: , Rfl:   ALLERGIES        Sulfa (sulfonamide antibiotics) and Sulfamethoxazole-trimethoprim  FAMILY HISTORY        Family History   Problem Relation Name Age of Onset    Heart disease Biological Mother      Hypertension Biological Mother      Atrial fibrillation Biological Mother      Diabetes Biological Mother      Heart disease Biological Father      Heart disease Biological Brother half-brother      SOCIAL/ TOBACCO HISTORY        Social History     Tobacco Use    Smoking status: Never    Smokeless tobacco: Never   Vaping Use    Vaping status: Never Used   Substance Use Topics    Alcohol use: Yes     Comment: on occasion    Drug use: Yes     Types: Marijuana     REVIEW OF SYSTEMS        Review of Systems   Constitutional:  Negative for activity change, appetite change, fatigue, fever and unexpected weight change.   HENT:  Negative for dental problem, ear discharge, ear pain, hearing loss, tinnitus,  "trouble swallowing and voice change.    Eyes:  Negative for visual disturbance.   Respiratory:  Negative for apnea, cough, chest tightness, shortness of breath and wheezing.    Cardiovascular:  Negative for chest pain, palpitations and leg swelling.   Gastrointestinal:  Negative for abdominal distention, abdominal pain, blood in stool, constipation, diarrhea, nausea and vomiting.   Endocrine: Negative for cold intolerance and heat intolerance.   Genitourinary:  Negative for difficulty urinating and hematuria.   Musculoskeletal:  Negative for arthralgias, back pain, joint swelling and myalgias.   Skin:  Negative for rash and wound.   Allergic/Immunologic: Negative for immunocompromised state.   Neurological:  Negative for dizziness, weakness, light-headedness, numbness and headaches.   Psychiatric/Behavioral:  Negative for dysphoric mood and sleep disturbance. The patient is not nervous/anxious.       PHYSICAL EXAMINATION      Vitals:    09/16/24 0919   BP: 115/80   BP Location: Left upper arm   Patient Position: Sitting   Pulse: 70   Resp: 18   Temp: 36.5 °C (97.7 °F)   TempSrc: Temporal   SpO2: 99%   Weight: 62.1 kg (137 lb)   Height: 1.657 m (5' 5.25\")      Body mass index is 22.62 kg/m².    Physical Exam  Vitals and nursing note reviewed.   Constitutional:       General: She is not in acute distress.     Appearance: Normal appearance.   HENT:      Head: Normocephalic and atraumatic.      Right Ear: Tympanic membrane, ear canal and external ear normal.      Left Ear: Tympanic membrane, ear canal and external ear normal.      Nose: Nose normal.      Mouth/Throat:      Mouth: Mucous membranes are moist.      Pharynx: Oropharynx is clear. No oropharyngeal exudate or posterior oropharyngeal erythema.   Eyes:      General: No scleral icterus.     Extraocular Movements: Extraocular movements intact.      Conjunctiva/sclera: Conjunctivae normal.      Pupils: Pupils are equal, round, and reactive to light.   Neck:      " "Thyroid: No thyroid mass, thyromegaly or thyroid tenderness.      Trachea: Trachea and phonation normal.   Cardiovascular:      Rate and Rhythm: Normal rate and regular rhythm.      Pulses: Normal pulses.      Heart sounds: Normal heart sounds. No murmur heard.  Pulmonary:      Effort: Pulmonary effort is normal. No respiratory distress.      Breath sounds: Normal breath sounds. No wheezing, rhonchi or rales.   Abdominal:      General: Abdomen is flat. There is no distension.      Palpations: Abdomen is soft. There is no mass.      Tenderness: There is no abdominal tenderness. There is no right CVA tenderness, left CVA tenderness, guarding or rebound.   Musculoskeletal:         General: No swelling or tenderness. Normal range of motion.      Cervical back: Normal range of motion and neck supple. No tenderness.      Right lower leg: No edema.      Left lower leg: No edema.   Lymphadenopathy:      Cervical: No cervical adenopathy.   Skin:     General: Skin is warm.      Capillary Refill: Capillary refill takes less than 2 seconds.      Findings: No lesion or rash.   Neurological:      General: No focal deficit present.      Mental Status: She is alert and oriented to person, place, and time. Mental status is at baseline.      Cranial Nerves: No cranial nerve deficit.      Sensory: No sensory deficit.      Coordination: Coordination normal.      Gait: Gait normal.      Deep Tendon Reflexes: Reflexes normal.   Psychiatric:         Mood and Affect: Mood normal.         Behavior: Behavior normal.         Thought Content: Thought content normal.         Judgment: Judgment normal.       PRIOR LABS        Lab Results   Component Value Date    HGBA1C 5.4 06/01/2023     No results found for: \"MICROALBUR\"  No results found for: \"ALBCRET\"  Lab Results   Component Value Date    CHOL 156 06/01/2023    CHOL 141 05/26/2022    CHOL 150 07/14/2021     Lab Results   Component Value Date    HDL 77 06/01/2023    HDL 79 05/26/2022    HDL " "77 07/14/2021     Lab Results   Component Value Date    LDLCALC 63 06/01/2023    LDLCALC 48 05/26/2022    LDLCALC 58 07/14/2021     Lab Results   Component Value Date    TRIG 79 06/01/2023    TRIG 68 05/26/2022    TRIG 73 07/14/2021     No results found for: \"CHOLHDL\"  Lab Results   Component Value Date    TSH 1.59 06/01/2023     Lab Results   Component Value Date    WBC 7.15 11/22/2023    HGB 12.5 11/22/2023    HCT 37.2 11/22/2023    MCV 89.6 11/22/2023     11/22/2023     Lab Results   Component Value Date     (L) 06/01/2023    K 5.2 (H) 06/01/2023     06/01/2023    CO2 28 06/01/2023    BUN 11 06/01/2023    CREATININE 0.9 06/01/2023    GLUCOSE 112 (H) 06/01/2023    AST 31 06/01/2023    ALT 25 06/01/2023    ALKPHOS 95 06/01/2023    PROTEIN 6.5 06/01/2023    ALBUMIN 3.8 06/01/2023    BILITOT 0.8 06/01/2023    EGFR >60.0 06/01/2023    ANIONGAP 6 06/01/2023       ASSESSMENT AND PLAN   Assessment   Problem List Items Addressed This Visit       Encounter for general medical examination - Primary     Health maintenance measures updated today.  -Covid-19 vaccine series:Complete on Saturday  -Influenza vaccine: Complete on Saturday  -Tdap:UTD  -Pneumococcal: UTD  -RSV planned  for 3 weeks.  -Pap smears: UTD with Gyn  -Mammogram: Ordered by Gyn  -DEXA: UTD with Gyn, 1/2024- AP lumbar spine with reduced T score.  -Colonoscopy: 2/22/23 - one polyp, diverticula, repeat in 10 years  -Depression screening performed, negative  -Counseled on regular aerobic exercise amounting to a minimum of 150 minutes most days per week and a healthful, whole foods-based diet such as the Mediterranean diet for cardiovascular risk reduction and overall health.         Age-related osteoporosis without current pathological fracture     Continue Boniva treatment - started 2/5/2024 following evident osteoporosis on DEXA 1/2024.  H/o fracture ankle 2022.  Plan for repeat DEXA 1/2026.          Other Visit Diagnoses       History of " shingles        Relevant Medications    valACYclovir (VALTREX) 1 gram tablet                 Leeann Ch MD  9/16/2024

## 2024-09-16 ENCOUNTER — OFFICE VISIT (OUTPATIENT)
Dept: FAMILY MEDICINE | Facility: CLINIC | Age: 64
End: 2024-09-16
Payer: COMMERCIAL

## 2024-09-16 VITALS
HEIGHT: 65 IN | WEIGHT: 137 LBS | HEART RATE: 70 BPM | BODY MASS INDEX: 22.82 KG/M2 | SYSTOLIC BLOOD PRESSURE: 115 MMHG | RESPIRATION RATE: 18 BRPM | TEMPERATURE: 97.7 F | DIASTOLIC BLOOD PRESSURE: 80 MMHG | OXYGEN SATURATION: 99 %

## 2024-09-16 DIAGNOSIS — M81.0 AGE-RELATED OSTEOPOROSIS WITHOUT CURRENT PATHOLOGICAL FRACTURE: ICD-10-CM

## 2024-09-16 DIAGNOSIS — Z00.00 ENCOUNTER FOR GENERAL MEDICAL EXAMINATION: Primary | ICD-10-CM

## 2024-09-16 DIAGNOSIS — Z86.19 HISTORY OF SHINGLES: ICD-10-CM

## 2024-09-16 PROCEDURE — 3008F BODY MASS INDEX DOCD: CPT | Performed by: SURGERY

## 2024-09-16 PROCEDURE — 99397 PER PM REEVAL EST PAT 65+ YR: CPT | Performed by: SURGERY

## 2024-09-16 RX ORDER — VALACYCLOVIR HYDROCHLORIDE 1 G/1
1000 TABLET, FILM COATED ORAL 3 TIMES DAILY
Qty: 21 TABLET | Refills: 0 | Status: SHIPPED | OUTPATIENT
Start: 2024-09-16 | End: 2024-09-23

## 2024-09-16 RX ORDER — LEVOCETIRIZINE DIHYDROCHLORIDE 5 MG/1
5 TABLET, FILM COATED ORAL EVERY EVENING
COMMUNITY

## 2024-09-16 ASSESSMENT — ENCOUNTER SYMPTOMS
UNEXPECTED WEIGHT CHANGE: 0
BLOOD IN STOOL: 0
SHORTNESS OF BREATH: 0
ACTIVITY CHANGE: 0
WHEEZING: 0
APNEA: 0
FATIGUE: 0
MYALGIAS: 0
NAUSEA: 0
WEAKNESS: 0
HEMATURIA: 0
VOMITING: 0
LIGHT-HEADEDNESS: 0
SLEEP DISTURBANCE: 0
CONSTIPATION: 0
DIARRHEA: 0
JOINT SWELLING: 0
DIFFICULTY URINATING: 0
HEADACHES: 0
ARTHRALGIAS: 0
ABDOMINAL DISTENTION: 0
WOUND: 0
PALPITATIONS: 0
BACK PAIN: 0
NUMBNESS: 0
NERVOUS/ANXIOUS: 0
DYSPHORIC MOOD: 0
CHEST TIGHTNESS: 0
COUGH: 0
VOICE CHANGE: 0
APPETITE CHANGE: 0
ABDOMINAL PAIN: 0
TROUBLE SWALLOWING: 0
DIZZINESS: 0
FEVER: 0

## 2024-09-16 ASSESSMENT — PATIENT HEALTH QUESTIONNAIRE - PHQ9: SUM OF ALL RESPONSES TO PHQ9 QUESTIONS 1 & 2: 0

## 2024-10-01 DIAGNOSIS — F51.01 PRIMARY INSOMNIA: ICD-10-CM

## 2024-10-01 DIAGNOSIS — F41.9 ANXIETY: ICD-10-CM

## 2024-10-01 NOTE — TELEPHONE ENCOUNTER
Medicine Refill Request    Last Office Visit: 9/16/2024   Last Consult Visit: Visit date not found  Last Telemedicine Visit: 2/5/2024 Osiris Ch, Leeann Jay MD    Next Appointment: Visit date not found      Current Outpatient Medications:     albuterol HFA (VENTOLIN HFA) 90 mcg/actuation inhaler, as needed., Disp: , Rfl:     aspirin 81 mg enteric coated tablet, Take 81 mg by mouth daily., Disp: , Rfl:     atorvastatin (LIPITOR) 40 mg tablet, take 1 tablet by mouth nightly, Disp: 90 tablet, Rfl: 3    azelastine (ASTELIN) 137 mcg (0.1 %) nasal spray, as needed., Disp: , Rfl:     buPROPion XL (WELLBUTRIN XL) 150 mg 24 hr tablet, Take 1 tablet (150 mg total) by mouth daily., Disp: 90 tablet, Rfl: 1    cetirizine (ZyrTEC) 10 mg tablet, daily. (Patient not taking: Reported on 9/16/2024), Disp: , Rfl:     DULoxetine (CYMBALTA) 30 mg capsule, Take 3 capsules (90 mg total) by mouth daily., Disp: 270 capsule, Rfl: 3    eszopiclone (LUNESTA) 3 mg tablet, Take 1 tablet (3 mg total) by mouth nightly. Take immediately before bedtime., Disp: 30 tablet, Rfl: 0    fluorouraciL (EFUDEX) 5 % cream, , Disp: , Rfl:     ibandronate (BONIVA) 150 mg tablet, Take 1 tablet (150 mg total) by mouth every 30 (thirty) days. Take in AM with glass of water prior to food, don't lie down for 30 minutes., Disp: 3 tablet, Rfl: 3    levocetirizine (XYZAL) 5 mg tablet, Take 5 mg by mouth every evening., Disp: , Rfl:     magnesium oxide (MAG-OX) 400 mg (241.3 mg magnesium) tablet, Take 1 tablet (400 mg total) by mouth 2 (two) times a day., Disp: 180 tablet, Rfl: 3    metoprolol succinate XL (TOPROL-XL) 25 mg 24 hr tablet, Take 0.5 tablets (12.5 mg total) by mouth daily., Disp: 45 tablet, Rfl: 3    metoprolol tartrate (LOPRESSOR) 25 mg tablet, Take 1 tablet (25 mg total) by mouth as needed (palpitations)., Disp: 5 tablet, Rfl: 6    montelukast (SINGULAIR) 10 mg tablet, Take 1 tablet by mouth nightly., Disp: , Rfl:     psyllium (METAMUCIL SMOOTH TEXTURE)  3.4 gram packet, Take by mouth daily., Disp: , Rfl:     SYMBICORT 80-4.5 mcg/actuation inhaler, as needed., Disp: , Rfl:     valACYclovir (VALTREX) 1 gram tablet, Take 1 tablet (1,000 mg total) by mouth 3 (three) times a day for 7 days., Disp: 21 tablet, Rfl: 0      BP Readings from Last 3 Encounters:   09/16/24 115/80   03/20/24 126/70   09/12/23 112/68       Recent Lab results:  Lab Results   Component Value Date    CHOL 156 06/01/2023   ,   Lab Results   Component Value Date    HDL 77 06/01/2023   ,   Lab Results   Component Value Date    LDLCALC 63 06/01/2023   ,   Lab Results   Component Value Date    TRIG 79 06/01/2023        Lab Results   Component Value Date    GLUCOSE 112 (H) 06/01/2023   ,   Lab Results   Component Value Date    HGBA1C 5.4 06/01/2023         Lab Results   Component Value Date    CREATININE 0.9 06/01/2023       Lab Results   Component Value Date    TSH 1.59 06/01/2023           Lab Results   Component Value Date    HGBA1C 5.4 06/01/2023

## 2024-10-02 RX ORDER — ESZOPICLONE 3 MG/1
3 TABLET, FILM COATED ORAL NIGHTLY
Qty: 30 TABLET | Refills: 0 | Status: SHIPPED | OUTPATIENT
Start: 2024-10-02

## 2024-10-02 RX ORDER — ESZOPICLONE 3 MG/1
3 TABLET, FILM COATED ORAL NIGHTLY
Qty: 30 TABLET | Refills: 0 | OUTPATIENT
Start: 2024-10-02

## 2024-10-02 NOTE — TELEPHONE ENCOUNTER
Medicine Refill Request    Last Office Visit: Visit date not found   Last Consult Visit: Visit date not found  Last Telemedicine Visit: Visit date not found    Next Appointment: Visit date not found      Current Outpatient Medications:     albuterol HFA (VENTOLIN HFA) 90 mcg/actuation inhaler, as needed., Disp: , Rfl:     aspirin 81 mg enteric coated tablet, Take 81 mg by mouth daily., Disp: , Rfl:     atorvastatin (LIPITOR) 40 mg tablet, take 1 tablet by mouth nightly, Disp: 90 tablet, Rfl: 3    azelastine (ASTELIN) 137 mcg (0.1 %) nasal spray, as needed., Disp: , Rfl:     buPROPion XL (WELLBUTRIN XL) 150 mg 24 hr tablet, Take 1 tablet (150 mg total) by mouth daily., Disp: 90 tablet, Rfl: 1    cetirizine (ZyrTEC) 10 mg tablet, daily. (Patient not taking: Reported on 9/16/2024), Disp: , Rfl:     DULoxetine (CYMBALTA) 30 mg capsule, Take 3 capsules (90 mg total) by mouth daily., Disp: 270 capsule, Rfl: 3    eszopiclone (LUNESTA) 3 mg tablet, Take 1 tablet (3 mg total) by mouth nightly. Take immediately before bedtime., Disp: 30 tablet, Rfl: 0    fluorouraciL (EFUDEX) 5 % cream, , Disp: , Rfl:     ibandronate (BONIVA) 150 mg tablet, Take 1 tablet (150 mg total) by mouth every 30 (thirty) days. Take in AM with glass of water prior to food, don't lie down for 30 minutes., Disp: 3 tablet, Rfl: 3    levocetirizine (XYZAL) 5 mg tablet, Take 5 mg by mouth every evening., Disp: , Rfl:     magnesium oxide (MAG-OX) 400 mg (241.3 mg magnesium) tablet, Take 1 tablet (400 mg total) by mouth 2 (two) times a day., Disp: 180 tablet, Rfl: 3    metoprolol succinate XL (TOPROL-XL) 25 mg 24 hr tablet, Take 0.5 tablets (12.5 mg total) by mouth daily., Disp: 45 tablet, Rfl: 3    metoprolol tartrate (LOPRESSOR) 25 mg tablet, Take 1 tablet (25 mg total) by mouth as needed (palpitations)., Disp: 5 tablet, Rfl: 6    montelukast (SINGULAIR) 10 mg tablet, Take 1 tablet by mouth nightly., Disp: , Rfl:     psyllium (METAMUCIL SMOOTH TEXTURE) 3.4  gram packet, Take by mouth daily., Disp: , Rfl:     SYMBICORT 80-4.5 mcg/actuation inhaler, as needed., Disp: , Rfl:     valACYclovir (VALTREX) 1 gram tablet, Take 1 tablet (1,000 mg total) by mouth 3 (three) times a day for 7 days., Disp: 21 tablet, Rfl: 0      BP Readings from Last 3 Encounters:   09/16/24 115/80   03/20/24 126/70   09/12/23 112/68       Recent Lab results:  Lab Results   Component Value Date    CHOL 156 06/01/2023   ,   Lab Results   Component Value Date    HDL 77 06/01/2023   ,   Lab Results   Component Value Date    LDLCALC 63 06/01/2023   ,   Lab Results   Component Value Date    TRIG 79 06/01/2023        Lab Results   Component Value Date    GLUCOSE 112 (H) 06/01/2023   ,   Lab Results   Component Value Date    HGBA1C 5.4 06/01/2023         Lab Results   Component Value Date    CREATININE 0.9 06/01/2023       Lab Results   Component Value Date    TSH 1.59 06/01/2023           Lab Results   Component Value Date    HGBA1C 5.4 06/01/2023

## 2024-10-17 ENCOUNTER — APPOINTMENT (OUTPATIENT)
Dept: LAB | Facility: HOSPITAL | Age: 64
End: 2024-10-17
Attending: STUDENT IN AN ORGANIZED HEALTH CARE EDUCATION/TRAINING PROGRAM
Payer: COMMERCIAL

## 2024-10-17 ENCOUNTER — TRANSCRIBE ORDERS (OUTPATIENT)
Dept: LAB | Facility: HOSPITAL | Age: 64
End: 2024-10-17

## 2024-10-17 DIAGNOSIS — G71.038 LIMB-GIRDLE MUSCULAR DYSTROPHY R21 ASSOCIATED WITH MUTATION IN POGLUT1 GENE (CMS/HCC): ICD-10-CM

## 2024-10-17 DIAGNOSIS — G71.038 LIMB-GIRDLE MUSCULAR DYSTROPHY R21 ASSOCIATED WITH MUTATION IN POGLUT1 GENE (CMS/HCC): Primary | ICD-10-CM

## 2024-10-17 LAB
BASOPHILS # BLD: 0.04 K/UL (ref 0.01–0.1)
BASOPHILS NFR BLD: 0.6 %
DIFFERENTIAL METHOD BLD: NORMAL
EOSINOPHIL # BLD: 0.2 K/UL (ref 0.04–0.36)
EOSINOPHIL NFR BLD: 2.8 %
ERYTHROCYTE [DISTWIDTH] IN BLOOD BY AUTOMATED COUNT: 13.6 % (ref 11.7–14.4)
HCT VFR BLD AUTO: 39.2 % (ref 35–45)
HGB BLD-MCNC: 12.9 G/DL (ref 11.8–15.7)
IMM GRANULOCYTES # BLD AUTO: 0.02 K/UL (ref 0–0.08)
IMM GRANULOCYTES NFR BLD AUTO: 0.3 %
LYMPHOCYTES # BLD: 2.11 K/UL (ref 1.2–3.5)
LYMPHOCYTES NFR BLD: 29.7 %
MCH RBC QN AUTO: 29.5 PG (ref 28–33.2)
MCHC RBC AUTO-ENTMCNC: 32.9 G/DL (ref 32.2–35.5)
MCV RBC AUTO: 89.5 FL (ref 83–98)
MONOCYTES # BLD: 0.41 K/UL (ref 0.28–0.8)
MONOCYTES NFR BLD: 5.8 %
NEUTROPHILS # BLD: 4.32 K/UL (ref 1.7–7)
NEUTROPHILS # BLD: 4.32 K/UL (ref 1.7–7)
NEUTS SEG NFR BLD: 60.8 %
NRBC BLD-RTO: 0 %
PLATELET # BLD AUTO: 341 K/UL (ref 150–369)
PMV BLD AUTO: 9.8 FL (ref 9.4–12.3)
RBC # BLD AUTO: 4.38 M/UL (ref 3.93–5.22)
WBC # BLD AUTO: 7.1 K/UL (ref 3.8–10.5)

## 2024-10-17 PROCEDURE — 85025 COMPLETE CBC W/AUTO DIFF WBC: CPT

## 2024-10-17 PROCEDURE — 36415 COLL VENOUS BLD VENIPUNCTURE: CPT

## 2024-11-26 NOTE — ASSESSMENT & PLAN NOTE
Patient was seen in the ED on 8/29/21 for complaints of chest pain.  CT of the chest was negative for PE and showed just trace pericardial effusion.  Troponin level negative x1.  She had resumed her NSAID and colchicine. She was treated for presumed pericarditis. She took ibuprofen for a few days with resolution of the symptom.       This has not recurred.       Statement Selected

## 2025-05-23 NOTE — PROGRESS NOTES
Daily Progress Note      Assessment/Plan   Anxiety  Assessment & Plan  Continue ativan, cymbalta and welbutrin.     Seasonal allergies  Assessment & Plan  On benadryl/singulair.    Coronary artery disease involving native coronary artery of native heart without angina pectoris  Assessment & Plan  POD #2 s/p Robo CABG (HEARD to LAD)  Continue Aspirin, plavix, statin and BB  Continue colchicine for PPS prophylaxis  Pepcid for GI prophylaxis    Bulb upper and DC lower chest tube  DC eng  DC IJ and [place] after Levo weaned      Dyslipidemia  Assessment & Plan  Continue statin      Subjective  Interval History: No new events overnight.  Pt denies Cp, SOB, Abd. Pain, N/V.  Pt admits to tolerating PO diet, voiding, and flatus.       Current Facility-Administered Medications   Medication Dose Route Frequency Provider Last Rate Last Admin   • acetaminophen (TYLENOL) tablet 975 mg  975 mg oral q6h YOON Compa Goss PA C   975 mg at 07/17/21 1808   • albumin human 5 % solution 500 mL  500 mL intravenous PRN Compa Goss PA C   250 mL at 07/17/21 0606   • alum-mag hydroxide-simeth (MAALOX) 200-200-20 mg/5 mL suspension 30 mL  30 mL oral q4h PRN Compa Goss PA C       • aspirin enteric coated tablet 81 mg  81 mg oral Daily Compa Goss PA C   81 mg at 07/17/21 0813   • atorvastatin (LIPITOR) tablet 40 mg  40 mg oral Nightly Compa Goss PA C   40 mg at 07/17/21 2130   • atropine injection 0.5 mg  0.5 mg intravenous q5 min PRN Compa Goss PA C       • [START ON 7/19/2021] bisacodyL (DULCOLAX) 10 mg suppository 10 mg  10 mg rectal PRN Compa Goss PA C       • buPROPion XL (WELLBUTRIN XL) 24 hr ER tablet 300 mg  300 mg oral Daily Compa Goss PA C   300 mg at 07/17/21 0815   • calcium chloride 1 g in sodium chloride 0.9 % 50 mL IVPB  1 g intravenous q6h PRN Opcgurjitynski, Jilda A, PA C   Stopped at 07/17/21 0342   • calcium chloride 100 mg/mL (10 %) injection 0.5 g  0.5 g  intravenous Once PRN Compa Goss PA C       • chlorhexidine (PERIDEX) 0.12 % mouthwash 15 mL  15 mL Mouth/Throat 2 times per day Compa Goss PA C   15 mL at 07/17/21 2130   • clopidogreL (PLAVIX) tablet 75 mg  75 mg oral Daily Compa Goss PA C   75 mg at 07/17/21 0813   • colchicine (COLCRYS) tablet 0.6 mg  0.6 mg oral Daily Compa Goss PA C   0.6 mg at 07/17/21 0813   • dexmedeTOMIDine in 0.9 % NaCL (PRECEDEX) 400 mcg/100 mL (4 mcg/mL) infusion  0.2-1.5 mcg/kg/hr intravenous Titrated Compa Goss PA C   Stopped at 07/17/21 1334   • insulin regular U-100 (HumuLIN R,NovoLIN R) 100 Units in sodium chloride 0.9 % 100 mL (1 Units/mL) infusion  0-15 Units/hr intravenous Titrated Compa Goss PA C   Stopped at 07/16/21 1745    And   • dextrose in water injection 5-15 g  10-30 mL intravenous PRN Compa Goss PA C       • diphenhydrAMINE (BENADRYL) capsule 25 mg  25 mg oral q6h PRN Compa Goss PA C        Or   • diphenhydrAMINE (BENADRYL) injection 25 mg  25 mg intravenous q6h PRN Compa Goss PA C       • docusate sodium (COLACE) capsule 200 mg  200 mg oral BID Compa Goss PA C   200 mg at 07/17/21 2023   • DULoxetine (CYMBALTA) capsule,delayed release(DR/EC) 90 mg  90 mg oral Daily Compa Goss PA C   90 mg at 07/17/21 0815   • famotidine (PEPCID) injection 20 mg  20 mg intravenous BID Compa Goss PA C   20 mg at 07/16/21 2007    Or   • famotidine (PEPCID) tablet 20 mg  20 mg oral BID Compa Goss PA C   20 mg at 07/17/21 2022   • furosemide (LASIX) tablet 40 mg  40 mg oral BID (am, 4p) Compa Goss PA C   40 mg at 07/17/21 0812    Followed by   • [START ON 7/20/2021] furosemide (LASIX) tablet 40 mg  40 mg oral Daily Compa Goss PA C       • gabapentin (NEURONTIN) capsule 300 mg  300 mg oral BID Compa Goss PA C   300 mg at 07/17/21 2023   • ketorolac (TORADOL) injection 30 mg  30 mg intravenous q6h PRN  Compa Goss PA C   30 mg at 07/17/21 2023   • LORazepam (ATIVAN) tablet 0.5 mg  0.5 mg oral q6h PRN Compa Goss PA C   0.5 mg at 07/16/21 0616   • magnesium oxide (MAG-OX) tablet 400 mg  400 mg oral BID Compa Goss PA C   400 mg at 07/17/21 2022   • magnesium sulfate IVPB 2g in 50 mL NSS/D5W/SWFI  2 g intravenous PRN Compa Goss PA C   Stopped at 07/17/21 2007   • metoprolol succinate XL (TOPROL-XL) 24 hr ER tablet 25 mg  25 mg oral Nightly Compa Goss PA C        Or   • metoprolol succinate XL (TOPROL-XL) 24 hr ER tablet 50 mg  50 mg oral Nightly Compa Goss PA C       • montelukast (SINGULAIR) tablet 10 mg  10 mg oral Nightly Compa Goss PA C   10 mg at 07/17/21 2130   • multivitamin tablet 1 tablet  1 tablet oral Daily Compa Goss PA C   1 tablet at 07/17/21 0816   • mupirocin (BACTROBAN) 2 % ointment 1 application  1 application Each Nostril BID Compa Goss PA C   1 application at 07/17/21 2030   • norepinephrine (LEVOPHED) 4 mg/250 mL (16 mcg/mL) in 0.9 % NaCl infusion  0.5-8 mcg/min intravenous Titrated Compa Goss PA C 11.25 mL/hr at 07/18/21 0200 3 mcg/min at 07/18/21 0200   • ondansetron ODT (ZOFRAN-ODT) disintegrating tablet 4 mg  4 mg oral q8h PRN Compa Goss PA C        Or   • ondansetron (ZOFRAN) injection 4 mg  4 mg intravenous q8h PRN Compa Goss PA C       • oxyCODONE (ROXICODONE) immediate release tablet 5 mg  5 mg oral q4h PRN Compa Goss PA C   5 mg at 07/17/21 2130   • potassium chloride (KLOR-CON) tablet extended release 20 mEq  20 mEq oral BID Compa Goss PA C   20 mEq at 07/17/21 2022   • potassium chloride 20 mEq in 100 mL IVPB  (premix)  20 mEq intravenous q8h PRN Compa Goss PA C   Stopped at 07/16/21 2101   • senna (SENOKOT) tablet 2 tablet  2 tablet oral BID Compa Goss PA C   2 tablet at 07/17/21 2023   • sodium bicarbonate 8.4 % (1 mEq/mL) injection  mEq   " mEq intravenous PRN Compa Goss PA C   50 mEq at 07/16/21 2134   • sodium chloride 0.9 % infusion   intravenous Continuous Felix Nix DO 40 mL/hr at 07/18/21 0200 Rate Verify at 07/18/21 0200     Allergies   Allergen Reactions   • Sulfa (Sulfonamide Antibiotics) Hives and Rash   • Sulfamethoxazole-Trimethoprim Rash         Objective  Vital signs in last 24 hours:  Heart Rate:  [59-83] 70  Resp:  [16-18] 16  BP: ()/(37-68) 106/55      Intake/Output Summary (Last 24 hours) at 7/18/2021 0228  Last data filed at 7/18/2021 0200  Gross per 24 hour   Intake 4105.08 ml   Output 2555 ml   Net 1550.08 ml       Physical Exam:  Visit Vitals  BP (!) 106/55   Pulse 70   Temp 36.5 °C (97.7 °F) (Temporal)   Resp 16   Ht 1.676 m (5' 5.98\")   Wt 60.3 kg (132 lb 15 oz)   SpO2 100%   BMI 21.47 kg/m²       General Appearance:   Neuro: Alert, cooperative, no distress, appears stated age    Normal strength, sensation and reflexes throughout   Lungs:   Clear to auscultation bilaterally, respirations unlabored   Heart:  Regular rate and rhythm, S1 and S2 normal, no murmur, rub or gallop   Abdomen:   Soft, non-tender, bowel sounds active all four quadrants, no masses, no organomegaly   Extremities: Extremities normal, atraumatic, no cyanosis or edema   Pulses: 2+ and symmetric all extremities   Skin:  Incisions:  Skin color, texture, turgor normal, no rashes or lesions  Left breast incision covered with silver mepilex. Upper and lower chest tubes to pleuravac       Labs  Lab Results   Component Value Date    WBC 7.40 07/17/2021    HGB 9.6 (L) 07/17/2021    HCT 28.4 (L) 07/17/2021     07/17/2021    CHOL 150 07/14/2021    TRIG 73 07/14/2021    HDL 77 07/14/2021    ALT 23 07/14/2021    AST 23 07/14/2021     07/17/2021    K 4.3 07/17/2021     07/17/2021    CREATININE 0.8 07/17/2021    BUN 10 07/17/2021    CO2 25 07/17/2021    TSH 2.33 07/14/2021    INR 1.2 07/16/2021    HGBA1C 5.5 07/14/2021 "       Imaging  I have independently reviewed the pertinent imaging from the last 24 hrs.    ECG/Telemetry  I have independently reviewed the telemetry. No events for the last 24 hours.    VTE Assessment: I have reassessed and the patient's VTE risk and treatment plan is appropriate.      Expected Discharge Date:  7/20/2021 No discharge date for patient encounter.    MISAEL Hinojosa  7/18/2021           PAST SURGICAL HISTORY:  History of dental examination

## (undated) DEVICE — ***USE 138611*** SUTURE PROLENE 6-0 8610H

## (undated) DEVICE — OCTOPUS NUVO

## (undated) DEVICE — FLO THRUS 1.75MM

## (undated) DEVICE — CORD BIPOLAR ENERGY INSTRUMENT

## (undated) DEVICE — CLAMP BULLDOG CARDIAC DISP

## (undated) DEVICE — SEAL UNIVERSAL 5MM-8MM XI

## (undated) DEVICE — APPLIER CLIP INSTRUMENT MEDIUM

## (undated) DEVICE — DRAPE WARMER 66X44

## (undated) DEVICE — PACK RFID ROBOTIC CABG

## (undated) DEVICE — TUBING SMOKE EVAC PENCIL COATED

## (undated) DEVICE — RETRACTOR SOFT TISSUE SMALL

## (undated) DEVICE — Device

## (undated) DEVICE — FLOW QC KEY FOR FLOWPROBE

## (undated) DEVICE — GOWN SURG X-LARGE MICROCOOL

## (undated) DEVICE — SUTURE VICRYL 3-0 J416H SH UNDYED

## (undated) DEVICE — DRESSING MEPILEX BORDER AG 4X4

## (undated) DEVICE — ***USE 56955*** SUTURE VICRYL 2-0  J339H CT-1

## (undated) DEVICE — GUIDEWIRE DIAGNOSTIC 035-260 EXCHANGE

## (undated) DEVICE — SPATULA MONOPOLAR CAUTERY PERM XI REPOSABLE

## (undated) DEVICE — BLADE STERNAL SAW

## (undated) DEVICE — DRAIN CHEST PLEUREVAC LATEX FREE

## (undated) DEVICE — COVER LIGHTHANDLE

## (undated) DEVICE — SPONGE SINGLE TONSIL LARGE 5/PK-100

## (undated) DEVICE — COVER KIT TRANSDUCER ULTRASOUND CIV-FLEX

## (undated) DEVICE — PAD GROUND ELECTROSURGICAL W/CORD

## (undated) DEVICE — CORD MONOPOLAR ENERGY INSTRUMENT

## (undated) DEVICE — TR BAND REGULAR

## (undated) DEVICE — SHEARS CRVD HOT XI  REPOSABLE

## (undated) DEVICE — MISTER BLOWER

## (undated) DEVICE — SYSTEM VISUALIZATION CLEARIFY

## (undated) DEVICE — BAG DECANTER

## (undated) DEVICE — FLO-THRUS 2.0MM

## (undated) DEVICE — CATHETER 6FR SWAN

## (undated) DEVICE — ***USE 57020*** SUTURE ETHIBOND 2   MX69G

## (undated) DEVICE — HEMOCLIPS SMALL WECK

## (undated) DEVICE — GUIDEWIRE DIAGNOSTIC J .035. 150 (ORDER IN 5'S)

## (undated) DEVICE — DRIVER NEEDLE LRG XI REPOSABLE

## (undated) DEVICE — GLIDESHEATH SLENDER SS (.021) 6FR 10CM

## (undated) DEVICE — CATH 6FR FL3.5

## (undated) DEVICE — DRESSING MEPILEX 4X4 BORDER

## (undated) DEVICE — SUTURE SILK 0 K834H

## (undated) DEVICE — SUTURE PROLENE  7-0  8304H

## (undated) DEVICE — DRAPE ARM DAVINCI XI

## (undated) DEVICE — CATH D 6F FR4 100CM

## (undated) DEVICE — TRAY URINE METER NON-SILVER  TEMP

## (undated) DEVICE — FLO-THRUS 1.5MM

## (undated) DEVICE — SOLN IRRIG STER WATER 1000ML

## (undated) DEVICE — APPLIER CLIP SMALL ENDOWRIST XI 8MM REPOSABLE

## (undated) DEVICE — ***USE 138683*** SUTURE ETHIBOND 2-0  X582H

## (undated) DEVICE — BLADE MICROSHARP 3MM 15DEG 7513

## (undated) DEVICE — FORCEPS MICRO BIPOLAR DAVINCI2 RESUABLE

## (undated) DEVICE — PAD DEFIB BIPHASIC HANDS FREE

## (undated) DEVICE — VIAL DECANTER

## (undated) DEVICE — SUTURE MONOCRYL 4-0 Y426H PS-2 27IN

## (undated) DEVICE — SOLN PLASMA-LYTE 500M

## (undated) DEVICE — APPLICATOR CHLORAPREP 26ML ORANGE TINT